# Patient Record
Sex: MALE | Race: WHITE | Employment: UNEMPLOYED | URBAN - METROPOLITAN AREA
[De-identification: names, ages, dates, MRNs, and addresses within clinical notes are randomized per-mention and may not be internally consistent; named-entity substitution may affect disease eponyms.]

---

## 2017-09-11 ENCOUNTER — HOSPITAL ENCOUNTER (OUTPATIENT)
Dept: SURGERY | Age: 57
Discharge: HOME | End: 2017-09-11
Payer: MEDICARE

## 2017-09-11 VITALS
SYSTOLIC BLOOD PRESSURE: 140 MMHG | WEIGHT: 227.06 LBS | TEMPERATURE: 98.9 F | DIASTOLIC BLOOD PRESSURE: 88 MMHG | HEIGHT: 76 IN | RESPIRATION RATE: 16 BRPM | BODY MASS INDEX: 27.65 KG/M2 | HEART RATE: 98 BPM | OXYGEN SATURATION: 100 %

## 2017-09-11 LAB
ANION GAP SERPL CALC-SCNC: 7 MMOL/L (ref 7–16)
APPEARANCE UR: CLEAR
ATRIAL RATE: 312 BPM
BACTERIA SPEC CULT: NORMAL
BASOPHILS # BLD: 0 K/UL (ref 0–0.2)
BASOPHILS NFR BLD: 0 % (ref 0–2)
BILIRUB UR QL: NEGATIVE
BUN SERPL-MCNC: 11 MG/DL (ref 6–23)
CALCIUM SERPL-MCNC: 9.4 MG/DL (ref 8.3–10.4)
CALCULATED R AXIS, ECG10: 87 DEGREES
CALCULATED T AXIS, ECG11: 97 DEGREES
CHLORIDE SERPL-SCNC: 106 MMOL/L (ref 98–107)
CO2 SERPL-SCNC: 29 MMOL/L (ref 21–32)
COLOR UR: YELLOW
CREAT SERPL-MCNC: 1.1 MG/DL (ref 0.8–1.5)
DIAGNOSIS, 93000: NORMAL
DIFFERENTIAL METHOD BLD: ABNORMAL
EOSINOPHIL # BLD: 0.2 K/UL (ref 0–0.8)
EOSINOPHIL NFR BLD: 2 % (ref 0.5–7.8)
ERYTHROCYTE [DISTWIDTH] IN BLOOD BY AUTOMATED COUNT: 12.8 % (ref 11.9–14.6)
EST. AVERAGE GLUCOSE BLD GHB EST-MCNC: 137 MG/DL
GLUCOSE BLD STRIP.AUTO-MCNC: 119 MG/DL (ref 65–100)
GLUCOSE SERPL-MCNC: 108 MG/DL (ref 65–100)
GLUCOSE UR STRIP.AUTO-MCNC: NEGATIVE MG/DL
HBA1C MFR BLD: 6.4 % (ref 4.8–6)
HCT VFR BLD AUTO: 46.7 % (ref 41.1–50.3)
HGB BLD-MCNC: 16.7 G/DL (ref 13.6–17.2)
HGB UR QL STRIP: NEGATIVE
IMM GRANULOCYTES # BLD: 0 K/UL (ref 0–0.5)
IMM GRANULOCYTES NFR BLD: 0.2 % (ref 0–5)
KETONES UR QL STRIP.AUTO: NEGATIVE MG/DL
LEUKOCYTE ESTERASE UR QL STRIP.AUTO: NEGATIVE
LYMPHOCYTES # BLD: 3.2 K/UL (ref 0.5–4.6)
LYMPHOCYTES NFR BLD: 30 % (ref 13–44)
MCH RBC QN AUTO: 29.5 PG (ref 26.1–32.9)
MCHC RBC AUTO-ENTMCNC: 35.8 G/DL (ref 31.4–35)
MCV RBC AUTO: 82.5 FL (ref 79.6–97.8)
MONOCYTES # BLD: 0.6 K/UL (ref 0.1–1.3)
MONOCYTES NFR BLD: 6 % (ref 4–12)
NEUTS SEG # BLD: 6.6 K/UL (ref 1.7–8.2)
NEUTS SEG NFR BLD: 62 % (ref 43–78)
NITRITE UR QL STRIP.AUTO: NEGATIVE
PH UR STRIP: 6 [PH] (ref 5–9)
PLATELET # BLD AUTO: 225 K/UL (ref 150–450)
PMV BLD AUTO: 10.2 FL (ref 10.8–14.1)
POTASSIUM SERPL-SCNC: 3.8 MMOL/L (ref 3.5–5.1)
PROT UR STRIP-MCNC: NEGATIVE MG/DL
Q-T INTERVAL, ECG07: 338 MS
QRS DURATION, ECG06: 92 MS
QTC CALCULATION (BEZET), ECG08: 375 MS
RBC # BLD AUTO: 5.66 M/UL (ref 4.23–5.67)
SERVICE CMNT-IMP: NORMAL
SODIUM SERPL-SCNC: 142 MMOL/L (ref 136–145)
SP GR UR REFRACTOMETRY: 1.01 (ref 1–1.02)
UROBILINOGEN UR QL STRIP.AUTO: 0.2 EU/DL (ref 0.2–1)
VENTRICULAR RATE, ECG03: 74 BPM
WBC # BLD AUTO: 10.7 K/UL (ref 4.3–11.1)

## 2017-09-11 PROCEDURE — 85025 COMPLETE CBC W/AUTO DIFF WBC: CPT | Performed by: ORTHOPAEDIC SURGERY

## 2017-09-11 PROCEDURE — 80048 BASIC METABOLIC PNL TOTAL CA: CPT | Performed by: ORTHOPAEDIC SURGERY

## 2017-09-11 PROCEDURE — 87641 MR-STAPH DNA AMP PROBE: CPT | Performed by: ORTHOPAEDIC SURGERY

## 2017-09-11 PROCEDURE — 93005 ELECTROCARDIOGRAM TRACING: CPT | Performed by: ANESTHESIOLOGY

## 2017-09-11 PROCEDURE — 83036 HEMOGLOBIN GLYCOSYLATED A1C: CPT | Performed by: ORTHOPAEDIC SURGERY

## 2017-09-11 PROCEDURE — 82962 GLUCOSE BLOOD TEST: CPT

## 2017-09-11 PROCEDURE — 81003 URINALYSIS AUTO W/O SCOPE: CPT | Performed by: ORTHOPAEDIC SURGERY

## 2017-09-11 PROCEDURE — 77030027138 HC INCENT SPIROMETER -A

## 2017-09-11 RX ORDER — METFORMIN HYDROCHLORIDE 500 MG/1
500 TABLET ORAL
COMMUNITY

## 2017-09-11 NOTE — PERIOP NOTES
All labs reviewed. MRSA/MSSA negative, UA negative. HGB A1c 6.4. All labs routed to Dr Taj Mascorro office.

## 2017-09-11 NOTE — PERIOP NOTES
Recent Results (from the past 12 hour(s))   GLUCOSE, POC    Collection Time: 09/11/17  9:40 AM   Result Value Ref Range    Glucose (POC) 119 (H) 65 - 100 mg/dL   URINALYSIS W/ RFLX MICROSCOPIC    Collection Time: 09/11/17  9:41 AM   Result Value Ref Range    Color YELLOW      Appearance CLEAR      Specific gravity 1.015 1.001 - 1.023      pH (UA) 6.0 5.0 - 9.0      Protein NEGATIVE  NEG mg/dL    Glucose NEGATIVE  mg/dL    Ketone NEGATIVE  NEG mg/dL    Bilirubin NEGATIVE  NEG      Blood NEGATIVE  NEG      Urobilinogen 0.2 0.2 - 1.0 EU/dL    Nitrites NEGATIVE  NEG      Leukocyte Esterase NEGATIVE  NEG     MSSA/MRSA SC BY PCR, NASAL SWAB    Collection Time: 09/11/17  9:41 AM   Result Value Ref Range    Special Requests: NO SPECIAL REQUESTS      Culture result:        SA target not detected. A MRSA NEGATIVE, SA NEGATIVE test result does not preclude MRSA or SA nasal colonization. CBC WITH AUTOMATED DIFF    Collection Time: 09/11/17 10:00 AM   Result Value Ref Range    WBC 10.7 4.3 - 11.1 K/uL    RBC 5.66 4.23 - 5.67 M/uL    HGB 16.7 13.6 - 17.2 g/dL    HCT 46.7 41.1 - 50.3 %    MCV 82.5 79.6 - 97.8 FL    MCH 29.5 26.1 - 32.9 PG    MCHC 35.8 (H) 31.4 - 35.0 g/dL    RDW 12.8 11.9 - 14.6 %    PLATELET 177 822 - 889 K/uL    MPV 10.2 (L) 10.8 - 14.1 FL    DF AUTOMATED      NEUTROPHILS 62 43 - 78 %    LYMPHOCYTES 30 13 - 44 %    MONOCYTES 6 4.0 - 12.0 %    EOSINOPHILS 2 0.5 - 7.8 %    BASOPHILS 0 0.0 - 2.0 %    IMMATURE GRANULOCYTES 0.2 0.0 - 5.0 %    ABS. NEUTROPHILS 6.6 1.7 - 8.2 K/UL    ABS. LYMPHOCYTES 3.2 0.5 - 4.6 K/UL    ABS. MONOCYTES 0.6 0.1 - 1.3 K/UL    ABS. EOSINOPHILS 0.2 0.0 - 0.8 K/UL    ABS. BASOPHILS 0.0 0.0 - 0.2 K/UL    ABS. IMM.  GRANS. 0.0 0.0 - 0.5 K/UL   METABOLIC PANEL, BASIC    Collection Time: 09/11/17 10:00 AM   Result Value Ref Range    Sodium 142 136 - 145 mmol/L    Potassium 3.8 3.5 - 5.1 mmol/L    Chloride 106 98 - 107 mmol/L    CO2 29 21 - 32 mmol/L    Anion gap 7 7 - 16 mmol/L    Glucose 108 (H) 65 - 100 mg/dL    BUN 11 6 - 23 MG/DL    Creatinine 1.10 0.8 - 1.5 MG/DL    GFR est AA >60 >60 ml/min/1.73m2    GFR est non-AA >60 >60 ml/min/1.73m2    Calcium 9.4 8.3 - 10.4 MG/DL   HEMOGLOBIN A1C WITH EAG    Collection Time: 09/11/17 10:00 AM   Result Value Ref Range    Hemoglobin A1c 6.4 (H) 4.8 - 6.0 %    Est. average glucose 137 mg/dL

## 2017-09-11 NOTE — PERIOP NOTES
Dr Madeline Simental reviewed pt. EKG obtained during PAT. Ordered to have pt follow up with Cardiologist prior to surg due to new onset AFIB. Pt voice understanding, appointment schedule 9/13/17 at 0900 at [facility].

## 2017-09-11 NOTE — PERIOP NOTES
Chart placed in chart room, to obtain cardiac clearance records after appointment scheduled on 9.13.17 with [facility] card.

## 2017-09-11 NOTE — PERIOP NOTES
Call placed to Dr Nancy Larry office, message left on voicemail for Delane Apley, informing pt to be seen by Cardiologist prior to surg.

## 2017-09-11 NOTE — PERIOP NOTES
Patient verified name, , and surgery as listed in [facility]. Patient provided medical/health information and PTA medications to the best of their ability. TYPE  CASE: 3  Orders per surgeon:  received  Labs per surgeon: CBC with diff,BMP,UA,Hgb A1c,MRSA/MSSA. Results: pending  Labs per anesthesia protocol: POC glucose. Results 119  EKG:  EKG 9.11.17     Nasal Swab collected per MD order and instructions for Mupirocin nasal ointment if required. Patient provided with and instructed on education handouts including Guide to Surgery, blood transfusions, pain management, and hand hygiene for the family and community, and [facility] brochure. Road to Recovery Spine surgery patient guide given. Instructed on incentive spirometry with return demonstration. Long handled prehab sponge given with instructions for use. Patient viewed spine prehab video. Hibiclens and instructions given per hospital policy. Instructed patient to continue previous medications as prescribed prior to surgery unless otherwise directed and to take the following medications the day of surgery according to anesthesia guidelines : Norvasc,Lipitor,Prozac . Instructed patient to hold  the following medications on the day of surgery: None. Original medication prescription bottles where not visualized during patient appointment. Medication profile updated and reviewed with patient. Patient teach back successful and patient demonstrates knowledge of instruction.

## 2017-09-21 ENCOUNTER — HOSPITAL ENCOUNTER (OUTPATIENT)
Dept: LAB | Age: 57
Discharge: HOME | End: 2017-09-21
Payer: MEDICARE

## 2017-09-21 DIAGNOSIS — I48.19 PERSISTENT ATRIAL FIBRILLATION (HCC): ICD-10-CM

## 2017-09-21 LAB — TSH SERPL DL<=0.005 MIU/L-ACNC: 1.16 UIU/ML (ref 0.36–3.74)

## 2017-09-21 PROCEDURE — 36415 COLL VENOUS BLD VENIPUNCTURE: CPT | Performed by: INTERNAL MEDICINE

## 2017-09-21 PROCEDURE — 84443 ASSAY THYROID STIM HORMONE: CPT | Performed by: INTERNAL MEDICINE

## 2017-10-09 ENCOUNTER — ANESTHESIA (OUTPATIENT)
Dept: SURGERY | Age: 57
DRG: 460 | End: 2017-10-09
Payer: MEDICARE

## 2017-10-09 ENCOUNTER — HOSPITAL ENCOUNTER (INPATIENT)
Age: 57
LOS: 3 days | Discharge: HOME | DRG: 460 | End: 2017-10-12
Attending: ORTHOPAEDIC SURGERY | Admitting: ORTHOPAEDIC SURGERY
Payer: MEDICARE

## 2017-10-09 ENCOUNTER — APPOINTMENT (OUTPATIENT)
Dept: GENERAL RADIOLOGY | Age: 57
DRG: 460 | End: 2017-10-09
Attending: ORTHOPAEDIC SURGERY
Payer: MEDICARE

## 2017-10-09 ENCOUNTER — ANESTHESIA EVENT (OUTPATIENT)
Dept: SURGERY | Age: 57
DRG: 460 | End: 2017-10-09
Payer: MEDICARE

## 2017-10-09 PROBLEM — M48.00 SPINAL STENOSIS: Status: ACTIVE | Noted: 2017-10-09

## 2017-10-09 PROBLEM — M43.16 SPONDYLOLISTHESIS OF LUMBAR REGION: Status: ACTIVE | Noted: 2017-10-09

## 2017-10-09 PROBLEM — M48.061 SPINAL STENOSIS OF LUMBAR REGION: Status: ACTIVE | Noted: 2017-10-09

## 2017-10-09 LAB
ABO + RH BLD: NORMAL
ANION GAP SERPL CALC-SCNC: 8 MMOL/L (ref 7–16)
BLOOD GROUP ANTIBODIES SERPL: NORMAL
BUN SERPL-MCNC: 13 MG/DL (ref 6–23)
CALCIUM SERPL-MCNC: 8.1 MG/DL (ref 8.3–10.4)
CHLORIDE SERPL-SCNC: 108 MMOL/L (ref 98–107)
CO2 SERPL-SCNC: 26 MMOL/L (ref 21–32)
CREAT SERPL-MCNC: 1.02 MG/DL (ref 0.8–1.5)
GLUCOSE BLD STRIP.AUTO-MCNC: 134 MG/DL (ref 65–100)
GLUCOSE SERPL-MCNC: 133 MG/DL (ref 65–100)
POTASSIUM SERPL-SCNC: 3.7 MMOL/L (ref 3.5–5.1)
SODIUM SERPL-SCNC: 142 MMOL/L (ref 136–145)
SPECIMEN EXP DATE BLD: NORMAL

## 2017-10-09 PROCEDURE — 77030020268 HC MISC GENERAL SUPPLY: Performed by: ORTHOPAEDIC SURGERY

## 2017-10-09 PROCEDURE — 77030032490 HC SLV COMPR SCD KNE COVD -B

## 2017-10-09 PROCEDURE — 77030011640 HC PAD GRND REM COVD -A: Performed by: ORTHOPAEDIC SURGERY

## 2017-10-09 PROCEDURE — 77030031139 HC SUT VCRL2 J&J -A: Performed by: ORTHOPAEDIC SURGERY

## 2017-10-09 PROCEDURE — 74011250636 HC RX REV CODE- 250/636

## 2017-10-09 PROCEDURE — 82962 GLUCOSE BLOOD TEST: CPT

## 2017-10-09 PROCEDURE — 74011250636 HC RX REV CODE- 250/636: Performed by: ORTHOPAEDIC SURGERY

## 2017-10-09 PROCEDURE — 0ST20ZZ RESECTION OF LUMBAR VERTEBRAL DISC, OPEN APPROACH: ICD-10-PCS | Performed by: ORTHOPAEDIC SURGERY

## 2017-10-09 PROCEDURE — 0SG10AJ FUSION OF 2 OR MORE LUMBAR VERTEBRAL JOINTS WITH INTERBODY FUSION DEVICE, POSTERIOR APPROACH, ANTERIOR COLUMN, OPEN APPROACH: ICD-10-PCS | Performed by: ORTHOPAEDIC SURGERY

## 2017-10-09 PROCEDURE — 65270000029 HC RM PRIVATE

## 2017-10-09 PROCEDURE — 94760 N-INVAS EAR/PLS OXIMETRY 1: CPT

## 2017-10-09 PROCEDURE — 0SG107J FUSION OF 2 OR MORE LUMBAR VERTEBRAL JOINTS WITH AUTOLOGOUS TISSUE SUBSTITUTE, POSTERIOR APPROACH, ANTERIOR COLUMN, OPEN APPROACH: ICD-10-PCS | Performed by: ORTHOPAEDIC SURGERY

## 2017-10-09 PROCEDURE — 74011250636 HC RX REV CODE- 250/636: Performed by: ANESTHESIOLOGY

## 2017-10-09 PROCEDURE — 77030019908 HC STETH ESOPH SIMS -A: Performed by: ANESTHESIOLOGY

## 2017-10-09 PROCEDURE — 74011000272 HC RX REV CODE- 272: Performed by: ORTHOPAEDIC SURGERY

## 2017-10-09 PROCEDURE — 77030020407 HC IV BLD WRMR ST 3M -A: Performed by: ANESTHESIOLOGY

## 2017-10-09 PROCEDURE — 0Q930ZZ DRAINAGE OF LEFT PELVIC BONE, OPEN APPROACH: ICD-10-PCS | Performed by: ORTHOPAEDIC SURGERY

## 2017-10-09 PROCEDURE — 74011250637 HC RX REV CODE- 250/637: Performed by: ANESTHESIOLOGY

## 2017-10-09 PROCEDURE — 77030030163 HC BN WAX J&J -A: Performed by: ORTHOPAEDIC SURGERY

## 2017-10-09 PROCEDURE — 77030014647 HC SEAL FBRN TISSL BAXT -D: Performed by: ORTHOPAEDIC SURGERY

## 2017-10-09 PROCEDURE — 77030032490 HC SLV COMPR SCD KNE COVD -B: Performed by: ORTHOPAEDIC SURGERY

## 2017-10-09 PROCEDURE — 77030037143 HC ROD SPN HEX XIA TI STRY -D: Performed by: ORTHOPAEDIC SURGERY

## 2017-10-09 PROCEDURE — 77030025623 HC BUR RND PRECIS STRY -D: Performed by: ORTHOPAEDIC SURGERY

## 2017-10-09 PROCEDURE — C1713 ANCHOR/SCREW BN/BN,TIS/BN: HCPCS | Performed by: ORTHOPAEDIC SURGERY

## 2017-10-09 PROCEDURE — 36415 COLL VENOUS BLD VENIPUNCTURE: CPT | Performed by: ORTHOPAEDIC SURGERY

## 2017-10-09 PROCEDURE — 74011250637 HC RX REV CODE- 250/637: Performed by: ORTHOPAEDIC SURGERY

## 2017-10-09 PROCEDURE — 00NY0ZZ RELEASE LUMBAR SPINAL CORD, OPEN APPROACH: ICD-10-PCS | Performed by: ORTHOPAEDIC SURGERY

## 2017-10-09 PROCEDURE — 77030014368: Performed by: ORTHOPAEDIC SURGERY

## 2017-10-09 PROCEDURE — 77030005401 HC CATH RAD ARRO -A: Performed by: ANESTHESIOLOGY

## 2017-10-09 PROCEDURE — 77010033678 HC OXYGEN DAILY

## 2017-10-09 PROCEDURE — 01NB0ZZ RELEASE LUMBAR NERVE, OPEN APPROACH: ICD-10-PCS | Performed by: ORTHOPAEDIC SURGERY

## 2017-10-09 PROCEDURE — 77030000001 HC SPCR SPN PEEK STRY -I2: Performed by: ORTHOPAEDIC SURGERY

## 2017-10-09 PROCEDURE — 77030037160 HC CGE SPN POST LUM TRITANIUM STRY -I2: Performed by: ORTHOPAEDIC SURGERY

## 2017-10-09 PROCEDURE — 77030012894: Performed by: ORTHOPAEDIC SURGERY

## 2017-10-09 PROCEDURE — 76010000176 HC OR TIME 4.5 TO 5 HR INTENSV-TIER 1: Performed by: ORTHOPAEDIC SURGERY

## 2017-10-09 PROCEDURE — 77030037145 HC XCONN SPN POLYX XIA STRY -G: Performed by: ORTHOPAEDIC SURGERY

## 2017-10-09 PROCEDURE — 76060000040 HC ANESTHESIA 4.5 TO 5 HR: Performed by: ORTHOPAEDIC SURGERY

## 2017-10-09 PROCEDURE — 74011000250 HC RX REV CODE- 250

## 2017-10-09 PROCEDURE — 77030008477 HC STYL SATN SLP COVD -A: Performed by: ANESTHESIOLOGY

## 2017-10-09 PROCEDURE — 77030037710: Performed by: ORTHOPAEDIC SURGERY

## 2017-10-09 PROCEDURE — 86900 BLOOD TYPING SEROLOGIC ABO: CPT | Performed by: ANESTHESIOLOGY

## 2017-10-09 PROCEDURE — 77030037155 HC BLCKR SPN CAP LOK AXI STRY -B: Performed by: ORTHOPAEDIC SURGERY

## 2017-10-09 PROCEDURE — 77030020782 HC GWN BAIR PAWS FLX 3M -B: Performed by: ANESTHESIOLOGY

## 2017-10-09 PROCEDURE — 77030018836 HC SOL IRR NACL ICUM -A: Performed by: ORTHOPAEDIC SURGERY

## 2017-10-09 PROCEDURE — 77030037158 HC BIT DRL SPN XIA DISP STRY -C: Performed by: ORTHOPAEDIC SURGERY

## 2017-10-09 PROCEDURE — 76210000017 HC OR PH I REC 1.5 TO 2 HR: Performed by: ORTHOPAEDIC SURGERY

## 2017-10-09 PROCEDURE — 77030034849: Performed by: ORTHOPAEDIC SURGERY

## 2017-10-09 PROCEDURE — 77030027138 HC INCENT SPIROMETER -A

## 2017-10-09 PROCEDURE — 80048 BASIC METABOLIC PNL TOTAL CA: CPT | Performed by: ORTHOPAEDIC SURGERY

## 2017-10-09 PROCEDURE — 77030008703 HC TU ET UNCUF COVD -A: Performed by: ANESTHESIOLOGY

## 2017-10-09 PROCEDURE — 77030031359 HC BLD BN MILL DISP STRY -E: Performed by: ORTHOPAEDIC SURGERY

## 2017-10-09 PROCEDURE — 72100 X-RAY EXAM L-S SPINE 2/3 VWS: CPT

## 2017-10-09 PROCEDURE — 77030034760 HC NDL BN MAR ASPIR JAMSH STRY -B: Performed by: ORTHOPAEDIC SURGERY

## 2017-10-09 DEVICE — VERTEBRAL SPACER
Type: IMPLANTABLE DEVICE | Site: SPINE LUMBAR | Status: FUNCTIONAL
Brand: AVS TL

## 2017-10-09 DEVICE — 30-36 MM POLYAXIAL CROSS CONNECTOR
Type: IMPLANTABLE DEVICE | Site: SPINE LUMBAR | Status: FUNCTIONAL
Brand: XIA 4 5

## 2017-10-09 DEVICE — GRAFT BNE SUB 5CC 2MM GRAN ALLOGENIC MORPHOGENETIC PROT W: Type: IMPLANTABLE DEVICE | Site: SPINE LUMBAR | Status: FUNCTIONAL

## 2017-10-09 DEVICE — BLOCKER
Type: IMPLANTABLE DEVICE | Site: SPINE LUMBAR | Status: FUNCTIONAL
Brand: XIA 4.5 SYSTEM -  XIA CT

## 2017-10-09 DEVICE — POLYAXIAL CORTICAL SCREW
Type: IMPLANTABLE DEVICE | Site: SPINE LUMBAR | Status: FUNCTIONAL
Brand: XIA 4.5 SYSTEM -  XIA CT

## 2017-10-09 DEVICE — GRAFT BNE SUB 10CC 2-4MM GROWTH FACT ALLGRFT OSTEOAMP: Type: IMPLANTABLE DEVICE | Site: SPINE LUMBAR | Status: FUNCTIONAL

## 2017-10-09 DEVICE — BIO DBM PLUS PUTTY (WITH CANCELLOUS)
Type: IMPLANTABLE DEVICE | Site: SPINE LUMBAR | Status: FUNCTIONAL
Brand: BIO DBM

## 2017-10-09 DEVICE — GRAFT BNE SUB 20CC 2 4MM GRAN GROWTH FACT ALLGRFT OSTEOAMP: Type: IMPLANTABLE DEVICE | Site: SPINE LUMBAR | Status: FUNCTIONAL

## 2017-10-09 DEVICE — VITALLIUM PREBENT AND PRECUT ROD WITH HEX
Type: IMPLANTABLE DEVICE | Site: SPINE LUMBAR | Status: FUNCTIONAL
Brand: XIA 4 5

## 2017-10-09 RX ORDER — GLYCOPYRROLATE 0.2 MG/ML
INJECTION INTRAMUSCULAR; INTRAVENOUS AS NEEDED
Status: DISCONTINUED | OUTPATIENT
Start: 2017-10-09 | End: 2017-10-09 | Stop reason: HOSPADM

## 2017-10-09 RX ORDER — HYDROMORPHONE HYDROCHLORIDE 2 MG/ML
INJECTION, SOLUTION INTRAMUSCULAR; INTRAVENOUS; SUBCUTANEOUS AS NEEDED
Status: DISCONTINUED | OUTPATIENT
Start: 2017-10-09 | End: 2017-10-09 | Stop reason: HOSPADM

## 2017-10-09 RX ORDER — CEFAZOLIN SODIUM IN 0.9 % NACL 2 G/50 ML
2 INTRAVENOUS SOLUTION, PIGGYBACK (ML) INTRAVENOUS EVERY 8 HOURS
Status: COMPLETED | OUTPATIENT
Start: 2017-10-10 | End: 2017-10-10

## 2017-10-09 RX ORDER — LIDOCAINE HYDROCHLORIDE 20 MG/ML
INJECTION, SOLUTION EPIDURAL; INFILTRATION; INTRACAUDAL; PERINEURAL AS NEEDED
Status: DISCONTINUED | OUTPATIENT
Start: 2017-10-09 | End: 2017-10-09 | Stop reason: HOSPADM

## 2017-10-09 RX ORDER — SODIUM CHLORIDE 0.9 % (FLUSH) 0.9 %
5-10 SYRINGE (ML) INJECTION AS NEEDED
Status: DISCONTINUED | OUTPATIENT
Start: 2017-10-09 | End: 2017-10-09 | Stop reason: HOSPADM

## 2017-10-09 RX ORDER — SODIUM CHLORIDE, SODIUM LACTATE, POTASSIUM CHLORIDE, CALCIUM CHLORIDE 600; 310; 30; 20 MG/100ML; MG/100ML; MG/100ML; MG/100ML
75 INJECTION, SOLUTION INTRAVENOUS CONTINUOUS
Status: DISCONTINUED | OUTPATIENT
Start: 2017-10-09 | End: 2017-10-09 | Stop reason: HOSPADM

## 2017-10-09 RX ORDER — HYDROMORPHONE HYDROCHLORIDE 1 MG/ML
1 INJECTION, SOLUTION INTRAMUSCULAR; INTRAVENOUS; SUBCUTANEOUS ONCE
Status: COMPLETED | OUTPATIENT
Start: 2017-10-09 | End: 2017-10-09

## 2017-10-09 RX ORDER — ACETAMINOPHEN 10 MG/ML
INJECTION, SOLUTION INTRAVENOUS AS NEEDED
Status: DISCONTINUED | OUTPATIENT
Start: 2017-10-09 | End: 2017-10-09 | Stop reason: HOSPADM

## 2017-10-09 RX ORDER — FAMOTIDINE 20 MG/1
20 TABLET, FILM COATED ORAL EVERY 12 HOURS
Status: DISCONTINUED | OUTPATIENT
Start: 2017-10-09 | End: 2017-10-12 | Stop reason: HOSPADM

## 2017-10-09 RX ORDER — FLUOXETINE HYDROCHLORIDE 20 MG/1
20 CAPSULE ORAL DAILY
Status: DISCONTINUED | OUTPATIENT
Start: 2017-10-10 | End: 2017-10-12 | Stop reason: HOSPADM

## 2017-10-09 RX ORDER — SODIUM CHLORIDE, SODIUM LACTATE, POTASSIUM CHLORIDE, CALCIUM CHLORIDE 600; 310; 30; 20 MG/100ML; MG/100ML; MG/100ML; MG/100ML
INJECTION, SOLUTION INTRAVENOUS
Status: DISCONTINUED | OUTPATIENT
Start: 2017-10-09 | End: 2017-10-09 | Stop reason: HOSPADM

## 2017-10-09 RX ORDER — SODIUM CHLORIDE 0.9 % (FLUSH) 0.9 %
5-10 SYRINGE (ML) INJECTION AS NEEDED
Status: DISCONTINUED | OUTPATIENT
Start: 2017-10-09 | End: 2017-10-12 | Stop reason: HOSPADM

## 2017-10-09 RX ORDER — HYDROMORPHONE HYDROCHLORIDE 1 MG/ML
1 INJECTION, SOLUTION INTRAMUSCULAR; INTRAVENOUS; SUBCUTANEOUS
Status: DISCONTINUED | OUTPATIENT
Start: 2017-10-09 | End: 2017-10-12 | Stop reason: HOSPADM

## 2017-10-09 RX ORDER — NALOXONE HYDROCHLORIDE 0.4 MG/ML
0.2 INJECTION, SOLUTION INTRAMUSCULAR; INTRAVENOUS; SUBCUTANEOUS AS NEEDED
Status: DISCONTINUED | OUTPATIENT
Start: 2017-10-09 | End: 2017-10-09 | Stop reason: HOSPADM

## 2017-10-09 RX ORDER — MIDAZOLAM HYDROCHLORIDE 1 MG/ML
2 INJECTION, SOLUTION INTRAMUSCULAR; INTRAVENOUS
Status: DISCONTINUED | OUTPATIENT
Start: 2017-10-09 | End: 2017-10-09 | Stop reason: HOSPADM

## 2017-10-09 RX ORDER — FENTANYL CITRATE 50 UG/ML
INJECTION, SOLUTION INTRAMUSCULAR; INTRAVENOUS AS NEEDED
Status: DISCONTINUED | OUTPATIENT
Start: 2017-10-09 | End: 2017-10-09 | Stop reason: HOSPADM

## 2017-10-09 RX ORDER — NEOSTIGMINE METHYLSULFATE 1 MG/ML
INJECTION INTRAVENOUS AS NEEDED
Status: DISCONTINUED | OUTPATIENT
Start: 2017-10-09 | End: 2017-10-09 | Stop reason: HOSPADM

## 2017-10-09 RX ORDER — ONDANSETRON 2 MG/ML
INJECTION INTRAMUSCULAR; INTRAVENOUS AS NEEDED
Status: DISCONTINUED | OUTPATIENT
Start: 2017-10-09 | End: 2017-10-09 | Stop reason: HOSPADM

## 2017-10-09 RX ORDER — AMLODIPINE BESYLATE 10 MG/1
10 TABLET ORAL DAILY
Status: DISCONTINUED | OUTPATIENT
Start: 2017-10-10 | End: 2017-10-12 | Stop reason: HOSPADM

## 2017-10-09 RX ORDER — PROPOFOL 10 MG/ML
INJECTION, EMULSION INTRAVENOUS AS NEEDED
Status: DISCONTINUED | OUTPATIENT
Start: 2017-10-09 | End: 2017-10-09 | Stop reason: HOSPADM

## 2017-10-09 RX ORDER — FAMOTIDINE 20 MG/1
20 TABLET, FILM COATED ORAL ONCE
Status: COMPLETED | OUTPATIENT
Start: 2017-10-09 | End: 2017-10-09

## 2017-10-09 RX ORDER — SODIUM CHLORIDE, SODIUM LACTATE, POTASSIUM CHLORIDE, CALCIUM CHLORIDE 600; 310; 30; 20 MG/100ML; MG/100ML; MG/100ML; MG/100ML
100 INJECTION, SOLUTION INTRAVENOUS CONTINUOUS
Status: DISCONTINUED | OUTPATIENT
Start: 2017-10-09 | End: 2017-10-12 | Stop reason: HOSPADM

## 2017-10-09 RX ORDER — HYDROMORPHONE HYDROCHLORIDE 2 MG/ML
0.5 INJECTION, SOLUTION INTRAMUSCULAR; INTRAVENOUS; SUBCUTANEOUS
Status: DISCONTINUED | OUTPATIENT
Start: 2017-10-09 | End: 2017-10-09 | Stop reason: HOSPADM

## 2017-10-09 RX ORDER — OXYCODONE AND ACETAMINOPHEN 5; 325 MG/1; MG/1
1 TABLET ORAL AS NEEDED
Status: DISCONTINUED | OUTPATIENT
Start: 2017-10-09 | End: 2017-10-09 | Stop reason: HOSPADM

## 2017-10-09 RX ORDER — ATORVASTATIN CALCIUM 10 MG/1
20 TABLET, FILM COATED ORAL DAILY
Status: DISCONTINUED | OUTPATIENT
Start: 2017-10-10 | End: 2017-10-12 | Stop reason: HOSPADM

## 2017-10-09 RX ORDER — CYCLOBENZAPRINE HCL 10 MG
5 TABLET ORAL
Status: DISCONTINUED | OUTPATIENT
Start: 2017-10-09 | End: 2017-10-12 | Stop reason: HOSPADM

## 2017-10-09 RX ORDER — IBUPROFEN 400 MG/1
400 TABLET ORAL
Status: DISCONTINUED | OUTPATIENT
Start: 2017-10-09 | End: 2017-10-12 | Stop reason: HOSPADM

## 2017-10-09 RX ORDER — CEFAZOLIN SODIUM IN 0.9 % NACL 2 G/50 ML
2 INTRAVENOUS SOLUTION, PIGGYBACK (ML) INTRAVENOUS ONCE
Status: COMPLETED | OUTPATIENT
Start: 2017-10-09 | End: 2017-10-09

## 2017-10-09 RX ORDER — ONDANSETRON 2 MG/ML
4 INJECTION INTRAMUSCULAR; INTRAVENOUS
Status: DISCONTINUED | OUTPATIENT
Start: 2017-10-09 | End: 2017-10-12 | Stop reason: HOSPADM

## 2017-10-09 RX ORDER — SODIUM CHLORIDE 0.9 % (FLUSH) 0.9 %
5-10 SYRINGE (ML) INJECTION EVERY 8 HOURS
Status: DISCONTINUED | OUTPATIENT
Start: 2017-10-09 | End: 2017-10-12 | Stop reason: HOSPADM

## 2017-10-09 RX ORDER — ROCURONIUM BROMIDE 10 MG/ML
INJECTION, SOLUTION INTRAVENOUS AS NEEDED
Status: DISCONTINUED | OUTPATIENT
Start: 2017-10-09 | End: 2017-10-09 | Stop reason: HOSPADM

## 2017-10-09 RX ORDER — ADHESIVE BANDAGE
30 BANDAGE TOPICAL DAILY
Status: DISCONTINUED | OUTPATIENT
Start: 2017-10-10 | End: 2017-10-12 | Stop reason: HOSPADM

## 2017-10-09 RX ORDER — LIDOCAINE HYDROCHLORIDE 10 MG/ML
0.1 INJECTION INFILTRATION; PERINEURAL AS NEEDED
Status: DISCONTINUED | OUTPATIENT
Start: 2017-10-09 | End: 2017-10-09 | Stop reason: HOSPADM

## 2017-10-09 RX ORDER — METFORMIN HYDROCHLORIDE 500 MG/1
500 TABLET ORAL
Status: DISCONTINUED | OUTPATIENT
Start: 2017-10-10 | End: 2017-10-12 | Stop reason: HOSPADM

## 2017-10-09 RX ORDER — OXYCODONE AND ACETAMINOPHEN 10; 325 MG/1; MG/1
1 TABLET ORAL
Status: DISCONTINUED | OUTPATIENT
Start: 2017-10-09 | End: 2017-10-12 | Stop reason: HOSPADM

## 2017-10-09 RX ADMIN — ROCURONIUM BROMIDE 10 MG: 10 INJECTION, SOLUTION INTRAVENOUS at 15:37

## 2017-10-09 RX ADMIN — GLYCOPYRROLATE 0.4 MG: 0.2 INJECTION INTRAMUSCULAR; INTRAVENOUS at 17:39

## 2017-10-09 RX ADMIN — CEFAZOLIN 2 G: 1 INJECTION, POWDER, FOR SOLUTION INTRAMUSCULAR; INTRAVENOUS; PARENTERAL at 16:34

## 2017-10-09 RX ADMIN — HYDROMORPHONE HYDROCHLORIDE 0.4 MG: 2 INJECTION, SOLUTION INTRAMUSCULAR; INTRAVENOUS; SUBCUTANEOUS at 17:27

## 2017-10-09 RX ADMIN — SODIUM CHLORIDE, SODIUM LACTATE, POTASSIUM CHLORIDE, AND CALCIUM CHLORIDE: 600; 310; 30; 20 INJECTION, SOLUTION INTRAVENOUS at 13:48

## 2017-10-09 RX ADMIN — ONDANSETRON 4 MG: 2 INJECTION INTRAMUSCULAR; INTRAVENOUS at 15:48

## 2017-10-09 RX ADMIN — OXYCODONE HYDROCHLORIDE AND ACETAMINOPHEN 1 TABLET: 10; 325 TABLET ORAL at 20:57

## 2017-10-09 RX ADMIN — Medication 5 ML: at 21:01

## 2017-10-09 RX ADMIN — LIDOCAINE HYDROCHLORIDE 60 MG: 20 INJECTION, SOLUTION EPIDURAL; INFILTRATION; INTRACAUDAL; PERINEURAL at 13:23

## 2017-10-09 RX ADMIN — ROCURONIUM BROMIDE 5 MG: 10 INJECTION, SOLUTION INTRAVENOUS at 16:30

## 2017-10-09 RX ADMIN — FAMOTIDINE 20 MG: 20 TABLET ORAL at 09:33

## 2017-10-09 RX ADMIN — HYDROMORPHONE HYDROCHLORIDE 1 MG: 1 INJECTION, SOLUTION INTRAMUSCULAR; INTRAVENOUS; SUBCUTANEOUS at 09:42

## 2017-10-09 RX ADMIN — SODIUM CHLORIDE, SODIUM LACTATE, POTASSIUM CHLORIDE, CALCIUM CHLORIDE: 600; 310; 30; 20 INJECTION, SOLUTION INTRAVENOUS at 15:17

## 2017-10-09 RX ADMIN — FENTANYL CITRATE 50 MCG: 50 INJECTION, SOLUTION INTRAMUSCULAR; INTRAVENOUS at 13:45

## 2017-10-09 RX ADMIN — ROCURONIUM BROMIDE 10 MG: 10 INJECTION, SOLUTION INTRAVENOUS at 14:00

## 2017-10-09 RX ADMIN — ROCURONIUM BROMIDE 5 MG: 10 INJECTION, SOLUTION INTRAVENOUS at 15:10

## 2017-10-09 RX ADMIN — HYDROMORPHONE HYDROCHLORIDE 0.5 MG: 2 INJECTION, SOLUTION INTRAMUSCULAR; INTRAVENOUS; SUBCUTANEOUS at 18:46

## 2017-10-09 RX ADMIN — HYDROMORPHONE HYDROCHLORIDE 0.5 MG: 2 INJECTION, SOLUTION INTRAMUSCULAR; INTRAVENOUS; SUBCUTANEOUS at 18:33

## 2017-10-09 RX ADMIN — FENTANYL CITRATE 50 MCG: 50 INJECTION, SOLUTION INTRAMUSCULAR; INTRAVENOUS at 15:15

## 2017-10-09 RX ADMIN — SODIUM CHLORIDE, SODIUM LACTATE, POTASSIUM CHLORIDE, CALCIUM CHLORIDE: 600; 310; 30; 20 INJECTION, SOLUTION INTRAVENOUS at 13:48

## 2017-10-09 RX ADMIN — PROPOFOL 200 MG: 10 INJECTION, EMULSION INTRAVENOUS at 13:23

## 2017-10-09 RX ADMIN — FAMOTIDINE 20 MG: 20 TABLET, FILM COATED ORAL at 20:57

## 2017-10-09 RX ADMIN — NEOSTIGMINE METHYLSULFATE 3 MG: 1 INJECTION INTRAVENOUS at 17:39

## 2017-10-09 RX ADMIN — FENTANYL CITRATE 50 MCG: 50 INJECTION, SOLUTION INTRAMUSCULAR; INTRAVENOUS at 15:44

## 2017-10-09 RX ADMIN — SODIUM CHLORIDE, SODIUM LACTATE, POTASSIUM CHLORIDE, AND CALCIUM CHLORIDE 100 ML/HR: 600; 310; 30; 20 INJECTION, SOLUTION INTRAVENOUS at 20:57

## 2017-10-09 RX ADMIN — HYDROMORPHONE HYDROCHLORIDE 0.4 MG: 2 INJECTION, SOLUTION INTRAMUSCULAR; INTRAVENOUS; SUBCUTANEOUS at 17:00

## 2017-10-09 RX ADMIN — NEOSTIGMINE METHYLSULFATE 1 MG: 1 INJECTION INTRAVENOUS at 17:44

## 2017-10-09 RX ADMIN — PROPOFOL 50 MG: 10 INJECTION, EMULSION INTRAVENOUS at 16:32

## 2017-10-09 RX ADMIN — ROCURONIUM BROMIDE 5 MG: 10 INJECTION, SOLUTION INTRAVENOUS at 16:22

## 2017-10-09 RX ADMIN — ROCURONIUM BROMIDE 20 MG: 10 INJECTION, SOLUTION INTRAVENOUS at 13:37

## 2017-10-09 RX ADMIN — HYDROMORPHONE HYDROCHLORIDE 0.5 MG: 2 INJECTION, SOLUTION INTRAMUSCULAR; INTRAVENOUS; SUBCUTANEOUS at 18:28

## 2017-10-09 RX ADMIN — FENTANYL CITRATE 50 MCG: 50 INJECTION, SOLUTION INTRAMUSCULAR; INTRAVENOUS at 13:39

## 2017-10-09 RX ADMIN — SODIUM CHLORIDE, SODIUM LACTATE, POTASSIUM CHLORIDE, AND CALCIUM CHLORIDE 75 ML/HR: 600; 310; 30; 20 INJECTION, SOLUTION INTRAVENOUS at 09:08

## 2017-10-09 RX ADMIN — ROCURONIUM BROMIDE 40 MG: 10 INJECTION, SOLUTION INTRAVENOUS at 13:23

## 2017-10-09 RX ADMIN — FENTANYL CITRATE 50 MCG: 50 INJECTION, SOLUTION INTRAMUSCULAR; INTRAVENOUS at 15:27

## 2017-10-09 RX ADMIN — GLYCOPYRROLATE 0.2 MG: 0.2 INJECTION INTRAMUSCULAR; INTRAVENOUS at 17:44

## 2017-10-09 RX ADMIN — ACETAMINOPHEN 1000 MG: 10 INJECTION, SOLUTION INTRAVENOUS at 16:11

## 2017-10-09 RX ADMIN — CEFAZOLIN 2 G: 1 INJECTION, POWDER, FOR SOLUTION INTRAMUSCULAR; INTRAVENOUS; PARENTERAL at 13:13

## 2017-10-09 RX ADMIN — HYDROMORPHONE HYDROCHLORIDE 0.5 MG: 2 INJECTION, SOLUTION INTRAMUSCULAR; INTRAVENOUS; SUBCUTANEOUS at 18:39

## 2017-10-09 RX ADMIN — FENTANYL CITRATE 50 MCG: 50 INJECTION, SOLUTION INTRAMUSCULAR; INTRAVENOUS at 14:00

## 2017-10-09 RX ADMIN — FENTANYL CITRATE 100 MCG: 50 INJECTION, SOLUTION INTRAMUSCULAR; INTRAVENOUS at 13:23

## 2017-10-09 NOTE — BRIEF OP NOTE
BRIEF OPERATIVE NOTE    Date of Procedure: 10/9/2017   Preoperative Diagnosis: Lumbar stenosis [M48.06]  Spondylolisthesis of lumbar region [M43.16]  Weakness of both lower extremities [R29.898]  Postoperative Diagnosis: Lumbar stenosis [M48.06]  Spondylolisthesis of lumbar region [M43.16]  Weakness of both lower extremities [R29.898]    Procedure(s):  L3-L5 LAMI FUSION SPINE  (TLIF)  Surgeon(s) and Role:     * Brett Tineo MD - Primary         Assistant Staff:       Surgical Staff:  Circ-1: Lydia Avila RN  Circ-Relief: Tiny Thompson RN; Nando Howard RN  Radiology Technician: RT David  Scrub Tech-1: Augusto Kocher Webster  Scrub Tech-2: Patti Cutler  Scrub Tech-Relief: Kathy Sanz; Brandon Tariq; Juan Swartz  Event Time In   Incision Start 1342   Incision Close      Anesthesia: General   Estimated Blood Loss: 600cc  Specimens: * No specimens in log *   Findings: severe stenosis   Complications: none  Implants:   Implant Name Type Inv.  Item Serial No.  Lot No. LRB No. Used Action   GRAFT BNE GRAN 5ML -- OSTEOAMP - [id number]  GRAFT BNE GRAN 5ML -- OSTEOAMP [id number]  N/A 1 Implanted   GRAFT BNE GRAN DBM 2-4MM 10CC -- OSTEOAMP - [id number]  GRAFT BNE GRAN DBM 2-4MM 10CC -- OSTEOAMP  [employer] [id number] N/A 1 Implanted   GRAFT BNE GRAN DBM 2-4MM 20ML -- OSTEOAMP - [id number]  GRAFT BNE GRAN DBM 2-4MM 20ML -- OSTEOAMP [id number]  N/A 1 Implanted   GRAFT DBM PTTY+ W/CHIP 10ML -- BIO DBM - [id number]  GRAFT DBM PTTY+ W/CHIP 10ML -- BIO DBM  JOSE SPINE [facility] [id number] N/A 1 Implanted   Tritanium PL 14mm x28mm x 6*    JOSE SPINE [facility] [id number] N/A 1 Implanted   BLOCKER SPNE CAP LCK -- JESUS 4.5 CT - [id number]  BLOCKER SPNE CAP LCK -- JESUS 4.5 CT  JOSE SPINE HOWM [id number] N/A 6 Implanted   SCR BNE MARCELLO POLYAXL 5.5X35MM -- JESUS 4.5 CT - [id number]  SCR BNE MARCELLO POLYAXL 5.5X35MM -- JESUS 4.5 CT JOSE SPINE HOWM [id number] N/A 6 Implanted   STACIA SPNE W/HEX 4.5X70MM VIT -- JESUS 4.5 - [id number]  STACIA SPNE W/HEX 4.5X70MM VIT -- JESUS 4.5  JOSE SPINE HOWM [id number] N/A 2 Implanted   SPACER SPNE VERT AVS 4D 16X30 --  - [id number]  SPACER SPNE VERT AVS 4D 16X30 --   JOSE SPINE HOWM [id number] N/A 1 Implanted   CONN CROSS SPNE POLYAXL MED -- JESUS 4.5 - [id number]   CONN CROSS SPNE POLYAXL MED -- JESUS 4.5   JOSE SPINE HOWM [id number] N/A 1 Implanted

## 2017-10-09 NOTE — H&P
[facility]   HISTORY AND PHYSICAL       Name:  Sarah Pillai   MR#:  [id number]   :  1960   Account #:  [de-identified]   Date of Adm:  10/09/2017       HISTORY OF PRESENT ILLNESS: He was last seen in the office   2017 for low back pain and bilateral leg pain and weakness   and nothing has changed in his review of systems or history   since that time, so for details on review of systems, refer to   that H and P. He is a 53-year-old gentleman on disability   because of back and leg pain. He has spinal stenosis. He did not   get better with conservative treatment. He also has a   spondylolisthesis at L4-L5. Because of his failure to get better,   he is being brought for surgical treatment. His medical problems   are high blood pressure, elevated cholesterol. ALLERGIES: HAS NO KNOWN DRUG ALLERGIES. MEDICATIONS: Currently on Norco for pain. PHYSICAL EXAMINATION   GENERAL: He is a healthy appearing gentleman. No gross   deformities. Mental status, alert and oriented x3. Normal   affect. HEENT: Exam shows pupils equal, round, reactive to light. Oropharynx clear. NECK: Without adenopathy or bruit. LUNGS: Clear to auscultation. HEART: Regular rate and rhythm without murmur. ABDOMEN: Soft and nontender, without hepatosplenomegaly. PLAN: L3-L5 laminectomy, fusion and TLIF. He has majority right   leg pain. We plan to perform a right L5 foraminotomy as well. We   went through the risk of surgery which include death, paralysis,   infection, bleeding, transfusion, heart attack, stroke, clot leg   clot lung, dural tear, failure of fusion,   failed instrumentation, and he desires to proceed on with   surgery. His other positive past medical is a history colon   cancer in , treated with surgery, no recurrence. Plan for   L3-L5 laminectomy and fusion, TLIF.         MD BRENDEN Rushing / Kumar   D:  10/09/2017   13:06   T:  10/09/2017   13:18   Job #:  [id number]

## 2017-10-09 NOTE — PROGRESS NOTES
TRANSFER - IN REPORT:    Verbal report received from Leopold Donna, [facility] on Guille Yusuf  being received from PACU for ordered procedure      Report consisted of patients Situation, Background, Assessment and   Recommendations(SBAR). Information from the following report(s) SBAR, Kardex, ED Summary, [facility] and Recent Results was reviewed with the receiving nurse. Opportunity for questions and clarification was provided. Assessment completed upon patients arrival to unit and care assumed.

## 2017-10-09 NOTE — ANESTHESIA PREPROCEDURE EVALUATION
Anesthetic History   No history of anesthetic complications            Review of Systems / Medical History  Patient summary reviewed, nursing notes reviewed and pertinent labs reviewed    Pulmonary  Within defined limits                 Neuro/Psych             Comments: Bilateral lower extremity weakness and apin Cardiovascular    Hypertension          Hyperlipidemia         GI/Hepatic/Renal                Endo/Other    Diabetes: well controlled, type 2    Arthritis     Other Findings              Physical Exam    Airway  Mallampati: II  TM Distance: 4 - 6 cm  Neck ROM: normal range of motion   Mouth opening: Normal     Cardiovascular    Rhythm: regular           Dental    Dentition: Edentulous     Pulmonary  Breath sounds clear to auscultation               Abdominal  GI exam deferred       Other Findings            Anesthetic Plan    ASA: 2  Anesthesia type: general          Induction: Intravenous  Anesthetic plan and risks discussed with: Patient

## 2017-10-09 NOTE — OP NOTES
[facility]   OPERATIVE REPORT       Name:  Geronimo Jaramillo   MR#:  [id number]   :  1960   Account #:  [de-identified]   Date of Adm:  10/09/2017       DATE OF PROCEDURE: 10/09/2017     PREOPERATIVE DIAGNOSES: Bilateral L3-4 and L4-L5 severe spinal   stenosis and spondylolisthesis. POSTOPERATIVE DIAGNOSES: L3-4 and L4-5 severe spinal stenosis   and spondylolisthesis. PROCEDURES:   1. Bilateral L3 to 5 laminectomy with medial partial   facetectomy. 2. L3-4 and L4-5 posterior lumbar interbody fusion. 3. Placement of biomechanical interbody device at L3-4 and L4-5   using the [employer] biomechanical implant. 4. L3 to L5 posterolateral fusion. 5. Placement of segmental spinal instrumentation L3 to L5 using   the Louise cortical screw and jose system. 6. Left iliac crest bone marrow aspiration. 7. Use of local bone graft, OsteoAMP and demineralized bone   matrix for fusion. SURGEON: Indiana Garces. Roland Brooks MD    ANESTHESIA: GETA. ESTIMATED BLOOD LOSS: 600 mL. FLUIDS: 3 liters crystalloid. DRAINS: One Hemovac. SPECIMENS: None. INDICATIONS: The patient is a 24-year-old gentleman who   has severe back and bilateral leg pain that has severely limited   his ability to stand and walk any distance, it has ruined his   lifestyle. He failed to get better long-term with epidural   steroid injections, pain medication, and activity restriction,   so he was brought for surgical treatment. PROCEDURE: The patient was brought to the operating room. After   administration of anesthesia, IV antibiotics and placement of   monitoring lines, he was positioned prone on the Brenda Niece frame. His back was prepped with Betadine and sterilely draped. At this   point, surgeon called a timeout, confirmed the procedure to be   performed, his allergy history and the fact that he had received   preoperative IV antibiotics.  A scalpel was used to make a   midline incision over the lower lumbar spine. We dissected down   through the subcutaneous tissue with electrocautery to the deep   fascia. Deep fascia was incised on either side of the spinous   processes and dissected down along the lamina out the facet   joint. We placed an angled curette in the interlaminar space,   took a lateral C-arm x-ray to determine our location. Once we   had done this, we stripped out the lateral gutter from the L3   transverse process down to L5. The L3-4 and L4-5 facet joints   were hugely hypertrophic. We had to osteotomize the joints. Once   we had stripped down to the membrane between the transverse   processes at each level, we removed the spinous processes of L3,   4 and 5 and saved this for local bone graft. We then burred down   the lamina of L3, 4 and 5. Bilaterally with a high-speed bur,   used a rongeur to break off as much dorsal bone as possible. Then we burred the lamina down further. We used angled curettes   to scrape the ligamentum off the undersurface of the bone then   used Kerrisons to perform a bilateral L3, 4, 5 laminectomies,   undercut the facet joints and performed foraminotomies. The   foramen had pieces of the facet joint growing into then causing   severe stenosis. The worst stenosis was at L4-5, but after we   had completed the decompression laminectomy, the dura expanded   nicely. The nerve roots were free. We then elected to place   interbody devices at both levels. We retracted the neural   structures medially, cauterized the epidural veins, incised the   disk annulus, removed disk with pituitaries. Then we used the   distractors and the zeina to remove disk and prepare the   endplate using angled curettes to remove bone off the endplates   and used the pituitaries to remove all the disk and eventually   we decided that a 16 mm device had an appropriate fit at L4-5,   and a 14 mm device at L3-4.  So we had harvested bone marrow   aspirate from the left iliac crest by inserting the needle in the crest, suctioning off 15 mL of bone marrow aspirate which   was mixed with 30 mL of OsteoAMP. We injected about 3 mL of   OsteoAMP into each disk space using the OsteoAMP gun dispenser   and then we used a PEEK cage at L4-5 because of the size and we   inserted it from the left side, tamped it anteriorly and across   the midline. At L3-4, we were able use with the titanium cages   and tamped this in from the left side anteriorly and across the   midline, good fit at both and once we had done that, we   thoroughly irrigated the wound multiple times, suctioned it dry. We then placed our instrumentation. We determined the starting   points for L3, 4 and 5 cortical screws. We burred a starting   hole and then drilled this, probed it. No breaches were found. Tapped it, reprobed it, no breaches were found and we inserted   5.5 x 35 mm screws at all levels bilaterally, they had good   purchase. We then placed rods into the screws, placed the   locking nuts loosely compressed across both levels and torqued   the locking nuts tight. AP and lateral C-arm x-ray were   obtained, which showed good position of the screws, the   interbody devices and overall good alignment of the spine. We   placed a cross connector between the 4 and 5 screw and secured   it to the jose bilaterally. We then decorticated the transverse   processes lateral bony margins L3 to 5 with a high-speed bur. We   mixed local bone graft and OsteoAMP into each lateral gutter and   then placed the demineralized bone matrix on top of this   bilaterally. We then placed a Hemovac drain deep in the wound   and brought out it through a separate proximal stab incision. We   placed vancomycin powder 1 gram in the wound and then we were   ready to close. The deep fascia was reapproximated with   interrupted figure-of-eight stitches of #1 Vicryl.  Subcutaneous   tissue was closed over this with interrupted stitches of 2-0   Vicryl and the skin was closed with a running subcuticular   stitch of 3-0 Vicryl. Steri-Strips and dry sterile dressings   were applied. The patient was then rolled supine onto the   recovery room bed, having tolerated the procedure well.         MD BRENDEN Doshi / Andrea New   D:  10/09/2017   17:50   T:  10/09/2017   19:27   Job #:  [id number]

## 2017-10-09 NOTE — PERIOP NOTES
TRANSFER - OUT REPORT:    Verbal report given to Karishma Rodriguez RN on Marika Sandy  being transferred to [facility] for routine post - op       Report consisted of patients Situation, Background, Assessment and   Recommendations(SBAR). Information from the following report(s) Procedure Summary, Intake/Output, MAR and Cardiac Rhythm A Fib was reviewed with the receiving nurse. Lines:   Peripheral IV 10/09/17 Left Forearm (Active)   Site Assessment Clean, dry, & intact 10/9/2017  6:52 PM   Phlebitis Assessment 0 10/9/2017  6:52 PM   Infiltration Assessment 0 10/9/2017  6:52 PM   Dressing Status Clean, dry, & intact 10/9/2017  6:52 PM   Dressing Type Tape;Transparent 10/9/2017  6:52 PM   Hub Color/Line Status Green; Infusing 10/9/2017  6:52 PM       Peripheral IV 10/09/17 Right Hand (Active)   Site Assessment Clean, dry, & intact 10/9/2017  6:52 PM   Phlebitis Assessment 0 10/9/2017  6:52 PM   Infiltration Assessment 0 10/9/2017  6:52 PM   Dressing Status Clean, dry, & intact 10/9/2017  6:52 PM   Dressing Type Tape;Transparent 10/9/2017  6:52 PM   Hub Color/Line Status Green;Capped;Flushed 10/9/2017  6:52 PM        Opportunity for questions and clarification was provided. Patient transported with:   O2 @ 2 liters  Tech    VTE prophylaxis orders have been written for Marika Sandy. Patient and family given floor number and nurses name. Family updated re: pt status after security code verified.  u

## 2017-10-09 NOTE — ANESTHESIA POSTPROCEDURE EVALUATION
Post-Anesthesia Evaluation and Assessment    Patient: Abad Liu MRN: [id number]  SSN: [id number]    YOB: 1960  Age: 56 y.o. Sex: male       Cardiovascular Function/Vital Signs  Visit Vitals    /88    Pulse 80    Temp 36.9 °C (98.5 °F)    Resp 10    Ht 6' 4\" (1.93 m)    Wt 99.4 kg (219 lb 3 oz)    SpO2 97%    BMI 26.68 kg/m2       Patient is status post general anesthesia for Procedure(s):  L3-L5 LAMI FUSION SPINE  (TLIF). Nausea/Vomiting: None    Postoperative hydration reviewed and adequate. Pain:  Pain Scale 1: Numeric (0 - 10) (10/09/17 1858)  Pain Intensity 1: 6 (10/09/17 1858)   Managed    Neurological Status:   Neuro (WDL): Within Defined Limits (10/09/17 1852)   At baseline    Mental Status and Level of Consciousness: Arousable    Pulmonary Status:   O2 Device: Nasal cannula (10/09/17 1852)   Adequate oxygenation and airway patent    Complications related to anesthesia: None    Post-anesthesia assessment completed.  No concerns    Signed By: Cynthia Kumar MD     October 9, 2017

## 2017-10-09 NOTE — IP AVS SNAPSHOT
Suleiman Marks 
 
 
 [address] 
[phone] Patient: Tino Low MRN: [id number] LKM:7/01/[birth year]4 You are allergic to the following No active allergies Immunizations Administered for This Admission Name Date Influenza Vaccine (Quad) PF 10/12/2017 Recent Documentation Height Weight BMI Smoking Status 1.93 m 99.4 kg 26.68 kg/m2 Former Smoker Emergency Contacts Name Discharge Info Relation Home Work Mobile AsaJennifer DISCHARGE CAREGIVER [3] Girlfriend [18] [phone] About your hospitalization You were admitted on:  October 9, 2017 You last received care in the:  [facility] 7 MED SURG You were discharged on:  October 12, 2017 Unit phone number:  [phone] Why you were hospitalized Your primary diagnosis was:  Spinal Stenosis Of Lumbar Region Your diagnoses also included:  Spondylolisthesis Of Lumbar Region, Spinal Stenosis Providers Seen During Your Hospitalizations Provider Role Specialty Primary office phone Stephy Byrd MD Attending Provider Orthopedic Surgery [phone] Your Primary Care Physician (PCP) Primary Care Physician Office Phone Office Fax [address] [facility] [place], [address] [facility] [phone] Follow-up Information Follow up With Details Comments Contact Info Nas Flores MD Call As needed Doctor Jose De Jesus [address] [facility] [address] 
[phone] Stephy Byrd MD On 10/24/2017 10:10 AM @ [place] [address] [facility] [address] 
[phone] Current Discharge Medication List  
  
CONTINUE these medications which have NOT CHANGED Dose & Instructions Dispensing Information Comments Morning Noon Evening Bedtime  
 amLODIPine 10 mg tablet Commonly known as:  Claudio Renteria Your next dose is:  Tomorrow Morning  Dose:  10 mg  
 Take 1 Tab by mouth daily. Quantity:  90 Tab Refills:  1  
     
  
   
   
   
  
 aspirin delayed-release 81 mg tablet Your next dose is:  Tomorrow Morning Dose:  81 mg Take 81 mg by mouth every morning. Refills:  0  
     
  
   
   
   
  
 atorvastatin 20 mg tablet Commonly known as:  LIPITOR Your next dose is:  Tomorrow Morning Dose:  20 mg Take 1 Tab by mouth daily. Quantity:  90 Tab Refills:  1 FLUoxetine 20 mg capsule Commonly known as:  PROzac Your next dose is:  Tomorrow Morning Dose:  20 mg Take 1 Cap by mouth daily. Quantity:  90 Cap Refills:  1 HYDROcodone-acetaminophen  mg tablet Commonly known as:  Rolinda Doles Your next dose is: Take on as needed schedule Dose:  1 Tab Take 1 Tab by mouth every eight (8) hours as needed for Pain. Quantity:  90 Tab Refills:  0  
     
   
   
   
  
 metFORMIN 500 mg tablet Commonly known as:  GLUCOPHAGE Your next dose is:  Tomorrow Morning Dose:  500 mg Take 500 mg by mouth daily (with breakfast). Refills:  0 Discharge Instructions MAY shower (may shower with dressing on)-->NO tub baths LEAVE dressing on incision for 3 DAYS-->then may remove (If dressing starts to fall off may re-secure with tape) NO lifting anything heavier than 5LBS  
 
NO Bending, Lifting or Twisting WEAR your brace if prescribed at all times EXCEPT when in bed, just going to the 
 bathroom, or showering Avoid sitting more than 20 - 30 minutes at at time NO driving until directed by your doctor DO NOT take any NSAIDS (either prescribed or over the counter until directed (Aleve, Ibuprofen, Mobic, etc) as this will interfere with bone healing CALL the doctor if:  Fever >100.5 
([phone])                 Incision becomes red, swollen or opens up Incision has yellow, thick drainage or an odor Pain is not managed with prescribed medications Excessive nausea and/or vomiting Avoid having pets sleep in bed with you until incision is completely healed DISCHARGE SUMMARY from Nurse The following personal items are in your possession at time of discharge: 
 
Dental Appliances: None Jewelry: None Clothing: Shirt, Pants, Footwear, Undergarments Other Valuables: None PATIENT INSTRUCTIONS: 
 
 
F-face looks uneven A-arms unable to move or move unevenly S-speech slurred or non-existent T-time-call 911 as soon as signs and symptoms begin-DO NOT go Back to bed or wait to see if you get better-TIME IS BRAIN. Warning Signs of HEART ATTACK Call 911 if you have these symptoms: 
? Chest discomfort. Most heart attacks involve discomfort in the center of the chest that lasts more than a few minutes, or that goes away and comes back. It can feel like uncomfortable pressure, squeezing, fullness, or pain. ? Discomfort in other areas of the upper body. Symptoms can include pain or discomfort in one or both arms, the back, neck, jaw, or stomach. ? Shortness of breath with or without chest discomfort. ? Other signs may include breaking out in a cold sweat, nausea, or lightheadedness. Don't wait more than five minutes to call [address]! Fast action can save your life. Calling 911 is almost always the fastest way to get lifesaving treatment. Emergency Medical Services staff can begin treatment when they arrive  up to an hour sooner than if someone gets to the hospital by car. The discharge information has been reviewed with the patient. The patient verbalized understanding.  
 
Discharge medications reviewed with the patient and appropriate educational materials and side effects teaching were provided. Discharge Orders None Introducing [facility] [place] [facility]! [facility] introduces SensorTran patient portal. Now you can access parts of your medical record, email your doctor's office, and request medication refills online. 1. In your internet browser, go to [email or web address] 2. Click on the First Time User? Click Here link in the Sign In box. You will see the New Member Sign Up page. 3. Enter your SensorTran Access Code exactly as it appears below. You will not need to use this code after youve completed the sign-up process. If you do not sign up before the expiration date, you must request a new code. · SensorTran Access Code: [email or web address] Expires: 12/4/2017  2:40 PM 
 
4. Enter the last four digits of your Social Security Number (xxxx) and Date of Birth (mm/dd/yyyy) as indicated and click Submit. You will be taken to the next sign-up page. 5. Create a SensorTran ID. This will be your SensorTran login ID and cannot be changed, so think of one that is secure and easy to remember. 6. Create a SensorTran password. You can change your password at any time. 7. Enter your Password Reset Question and Answer. This can be used at a later time if you forget your password. 8. Enter your e-mail address. You will receive e-mail notification when new information is available in [address] 9. Click Sign Up. You can now view and download portions of your medical record. 10. Click the Download Summary menu link to download a portable copy of your medical information. If you have questions, please visit the Frequently Asked Questions section of the S[employer]Tran website. Remember, SensorTran is NOT to be used for urgent needs. For medical emergencies, dial 911. Now available from your iPhone and Android! General Information Please provide this summary of care documentation to your next provider. Patient Signature:  ____________________________________________________________ Date:  ____________________________________________________________  
  
Karen Radha Provider Signature:  ____________________________________________________________ Date:  ____________________________________________________________

## 2017-10-10 LAB
BASOPHILS # BLD: 0 K/UL (ref 0–0.2)
BASOPHILS NFR BLD: 0 % (ref 0–2)
DIFFERENTIAL METHOD BLD: ABNORMAL
EOSINOPHIL # BLD: 0.2 K/UL (ref 0–0.8)
EOSINOPHIL NFR BLD: 2 % (ref 0.5–7.8)
ERYTHROCYTE [DISTWIDTH] IN BLOOD BY AUTOMATED COUNT: 13.1 % (ref 11.9–14.6)
HCT VFR BLD AUTO: 38.1 % (ref 41.1–50.3)
HGB BLD-MCNC: 13.4 G/DL (ref 13.6–17.2)
IMM GRANULOCYTES # BLD: 0 K/UL (ref 0–0.5)
IMM GRANULOCYTES NFR BLD: 0.1 % (ref 0–5)
LYMPHOCYTES # BLD: 2.7 K/UL (ref 0.5–4.6)
LYMPHOCYTES NFR BLD: 30 % (ref 13–44)
MCH RBC QN AUTO: 28.9 PG (ref 26.1–32.9)
MCHC RBC AUTO-ENTMCNC: 35.2 G/DL (ref 31.4–35)
MCV RBC AUTO: 82.1 FL (ref 79.6–97.8)
MONOCYTES # BLD: 0.6 K/UL (ref 0.1–1.3)
MONOCYTES NFR BLD: 7 % (ref 4–12)
NEUTS SEG # BLD: 5.4 K/UL (ref 1.7–8.2)
NEUTS SEG NFR BLD: 61 % (ref 43–78)
PLATELET # BLD AUTO: 188 K/UL (ref 150–450)
PMV BLD AUTO: 10.1 FL (ref 10.8–14.1)
RBC # BLD AUTO: 4.64 M/UL (ref 4.23–5.67)
WBC # BLD AUTO: 8.8 K/UL (ref 4.3–11.1)

## 2017-10-10 PROCEDURE — 77010033678 HC OXYGEN DAILY

## 2017-10-10 PROCEDURE — 36415 COLL VENOUS BLD VENIPUNCTURE: CPT | Performed by: ORTHOPAEDIC SURGERY

## 2017-10-10 PROCEDURE — 97530 THERAPEUTIC ACTIVITIES: CPT

## 2017-10-10 PROCEDURE — 74011250637 HC RX REV CODE- 250/637: Performed by: ORTHOPAEDIC SURGERY

## 2017-10-10 PROCEDURE — 74011250636 HC RX REV CODE- 250/636: Performed by: ORTHOPAEDIC SURGERY

## 2017-10-10 PROCEDURE — 85025 COMPLETE CBC W/AUTO DIFF WBC: CPT | Performed by: ORTHOPAEDIC SURGERY

## 2017-10-10 PROCEDURE — 65270000029 HC RM PRIVATE

## 2017-10-10 PROCEDURE — 97161 PT EVAL LOW COMPLEX 20 MIN: CPT

## 2017-10-10 PROCEDURE — 94760 N-INVAS EAR/PLS OXIMETRY 1: CPT

## 2017-10-10 RX ADMIN — SODIUM CHLORIDE, SODIUM LACTATE, POTASSIUM CHLORIDE, AND CALCIUM CHLORIDE 100 ML/HR: 600; 310; 30; 20 INJECTION, SOLUTION INTRAVENOUS at 09:00

## 2017-10-10 RX ADMIN — AMLODIPINE BESYLATE 10 MG: 10 TABLET ORAL at 08:56

## 2017-10-10 RX ADMIN — Medication 10 ML: at 21:06

## 2017-10-10 RX ADMIN — OXYCODONE HYDROCHLORIDE AND ACETAMINOPHEN 1 TABLET: 10; 325 TABLET ORAL at 22:57

## 2017-10-10 RX ADMIN — Medication 5 ML: at 04:33

## 2017-10-10 RX ADMIN — OXYCODONE HYDROCHLORIDE AND ACETAMINOPHEN 1 TABLET: 10; 325 TABLET ORAL at 18:43

## 2017-10-10 RX ADMIN — MAGNESIUM HYDROXIDE 30 ML: 400 SUSPENSION ORAL at 08:56

## 2017-10-10 RX ADMIN — IBUPROFEN 400 MG: 400 TABLET, FILM COATED ORAL at 22:57

## 2017-10-10 RX ADMIN — CEFAZOLIN 2 G: 1 INJECTION, POWDER, FOR SOLUTION INTRAMUSCULAR; INTRAVENOUS; PARENTERAL at 01:20

## 2017-10-10 RX ADMIN — CEFAZOLIN 2 G: 1 INJECTION, POWDER, FOR SOLUTION INTRAMUSCULAR; INTRAVENOUS; PARENTERAL at 08:56

## 2017-10-10 RX ADMIN — FAMOTIDINE 20 MG: 20 TABLET, FILM COATED ORAL at 08:55

## 2017-10-10 RX ADMIN — ATORVASTATIN CALCIUM 20 MG: 10 TABLET, FILM COATED ORAL at 08:56

## 2017-10-10 RX ADMIN — CEFAZOLIN 2 G: 1 INJECTION, POWDER, FOR SOLUTION INTRAMUSCULAR; INTRAVENOUS; PARENTERAL at 16:41

## 2017-10-10 RX ADMIN — METFORMIN HYDROCHLORIDE 500 MG: 500 TABLET, FILM COATED ORAL at 08:56

## 2017-10-10 RX ADMIN — Medication 5 ML: at 14:00

## 2017-10-10 RX ADMIN — FLUOXETINE 20 MG: 20 CAPSULE ORAL at 08:56

## 2017-10-10 RX ADMIN — FAMOTIDINE 20 MG: 20 TABLET, FILM COATED ORAL at 21:06

## 2017-10-10 RX ADMIN — OXYCODONE HYDROCHLORIDE AND ACETAMINOPHEN 1 TABLET: 10; 325 TABLET ORAL at 08:56

## 2017-10-10 RX ADMIN — OXYCODONE HYDROCHLORIDE AND ACETAMINOPHEN 1 TABLET: 10; 325 TABLET ORAL at 04:32

## 2017-10-10 RX ADMIN — CHLORASEPTIC 1 SPRAY: 1.5 LIQUID ORAL at 04:34

## 2017-10-10 NOTE — PROGRESS NOTES
Dual Skin Assessment    Skin assessment completed with Ariadne HICKEY. Patient has a surgical incision to back with a hemovac attached, See below. Patient has an abdominal scar. No other skin issues noted. Wound Spine Posterior (Active)   DRESSING STATUS Clean, dry, and intact 10/9/2017  6:52 PM   DRESSING TYPE 4 x 4;Adhesive wound closure strips (Steri-Strips); Adhesive wound dressing (Mastisol); Transparent film 10/9/2017  1:00 PM        Darlyn Mari RN    10/9/2017 11:12 PM

## 2017-10-10 NOTE — PROGRESS NOTES
TRANSFER - IN REPORT:    Verbal report received from [facility] on Anisa Perez  being received from PACU for routine post - op      Report consisted of patients Situation, Background, Assessment and   Recommendations(SBAR). Information from the following report(s) SBAR, Kardex, OR Summary, MAR and Recent Results was reviewed with the receiving nurse. Opportunity for questions and clarification was provided. Assessment completed upon patients arrival to unit and care assumed.

## 2017-10-10 NOTE — PROGRESS NOTES
Problem: Falls - Risk of  Goal: *Absence of Falls  Document Eliot Fall Risk and appropriate interventions in the flowsheet.    Outcome: Progressing Towards Goal  Fall Risk Interventions:  Mobility Interventions: Bed/chair exit alarm, OT consult for ADLs, Patient to call before getting OOB, PT Consult for mobility concerns, Strengthening exercises (ROM-active/passive), PT Consult for assist device competence, Utilize walker, cane, or other assitive device           Medication Interventions: Bed/chair exit alarm, Evaluate medications/consider consulting pharmacy     Elimination Interventions: Bed/chair exit alarm, Call light in reach

## 2017-10-10 NOTE — PROGRESS NOTES
Problem: Mobility Impaired (Adult and Pediatric)  Goal: *Acute Goals and Plan of Care (Insert Text)  Discharge Goals:  (1.)Mr. Ariadna Taylor will move from supine to sit and sit to supine , scoot up and down and roll side to side with MODIFIED INDEPENDENCE within 3 day(s). (2.)Mr. Ariadna Taylor will transfer from bed to chair and chair to bed with MODIFIED INDEPENDENCE using the least restrictive device within 3 day(s). (3.)Mr. Caruso will ambulate with MODIFIED INDEPENDENCE for 250 feet with the least restrictive device within 3 day(s). (4.)Mr. Ariadna Taylor will verbalize spinal precautions with 0 verbal/manual cues within 3 day(s). (5.)Mr. Ariadna Taylor will participate in 10 minutes of LE exercise to address activity tolerance with MIN verbal/manual cues to maintain proper form within 3 day(s). ________________________________________________________________________________________________      PHYSICAL THERAPY: INITIAL ASSESSMENT, AM 10/10/2017  INPATIENT: Hospital Day: 2  Payor: [employer] / Plan: [address] [place] [address] HMO / Product Type: Managed Care Medicare /      NAME/AGE/GENDER: Darlyn Ruiz is a 56 y.o. male     PRIMARY DIAGNOSIS: Lumbar stenosis [M48.061]  Spondylolisthesis of lumbar region [M43.16]  Weakness of both lower extremities [R29.898] Spinal stenosis of lumbar region Spinal stenosis of lumbar region  Procedure(s) (LRB):  L3-L5 LAMI FUSION SPINE  (TLIF) (N/A)  1 Day Post-Op  ICD-10: Treatment Diagnosis:       · Generalized Muscle Weakness (M62.81)  · Other lack of cordination (R27.8)  · Other abnormalities of gait and mobility (R26.89)   Precaution/Allergies:  Review of patient's allergies indicates no known allergies. ASSESSMENT:      Mr. Ariadna Taylor is a 56 y.o. male admitted for L3-L5 spinal fusion. He presents to therapy in supine without any pain, \"just sore\". Mr. Ariadna Taylor lives alone in a single-story home without any steps to enter, however, his girlfriend will be staying with him during recovery.  He was previously using a cane, but would like a rolling walker as per MD advice. He was able to independently don back brace. He was able to move from supine to sit to stand with CGA. After 6' of ambulation with RW, Mr. Toribio Severino became faint and sat in chair with MIN ASSIST. BP at 111/70, SpO2 at 98, HR at 94 bpm, and RN notified. He was able to ambulate back to bed and complete bed mobility with CGA. Tino Low is currently functioning below his baseline and would benefit from skilled PT during acute care stay to maximize safety and independence with functional mobility. This section established at most recent assessment   PROBLEM LIST (Impairments causing functional limitations):  1. Decreased Strength  2. Decreased ADL/Functional Activities  3. Decreased Transfer Abilities  4. Decreased Ambulation Ability/Technique  5. Decreased Balance  6. Decreased Activity Tolerance  7. Increased Fatigue  8. Decreased Knowledge of Precautions  9. Decreased [place] with Home Exercise Program    INTERVENTIONS PLANNED: (Benefits and precautions of physical therapy have been discussed with the patient.)  1. Balance Exercise  2. Bed Mobility  3. Gait Training  4. Home Exercise Program (HEP)  5. Neuromuscular Re-education/Strengthening  6. Therapeutic Activites  7. Therapeutic Exercise/Strengthening  8. Transfer Training  9. Group Therapy      TREATMENT PLAN: Frequency/Duration: twice daily for duration of hospital stay  Rehabilitation Potential For Stated Goals: GOOD      RECOMMENDED REHABILITATION/EQUIPMENT: (at time of discharge pending progress): Due to the probability of continued deficits (see above) this patient will likely need continued skilled physical therapy after discharge. Equipment:   · Walkers, Type: Rolling Walker                   HISTORY:   History of Present Injury/Illness (Reason for Referral):   \"HISTORY OF PRESENT ILLNESS: He was last seen in the office   08/25/2017 for low back pain and bilateral leg pain and weakness   and nothing has changed in his review of systems or history   since that time, so for details on review of systems, refer to   that H and P. He is a 79-year-old gentleman on disability   because of back and leg pain. He has spinal stenosis. He did not   get better with conservative treatment. He also has a   spondylolisthesis at L4-L5. Because of his failure to get better,   he is being brought for surgical treatment. His medical problems   are high blood pressure, elevated cholesterol\"  Past Medical History/Comorbidities:   Mr. José Antonio Adkins  has a past medical history of Arthritis; Chronic back pain; Colon cancer ([facility] [address] (1990); Depression (05/14/2015); Diabetes ([facility] [address] Former cigarette smoker; HTN (hypertension) (04/28/2014); and Hypercholesteremia. He also has no past medical history of Coagulation defects; COPD; Other ill-defined conditions(799.89); or Unspecified adverse effect of anesthesia. Mr. José Antonio Adkins  has a past surgical history that includes small bowel resection (1990); lap cholecystectomy; colonoscopy; and wisdom teeth extraction. Social History/Living Environment:   Home Environment: Private residence  # Steps to Enter: 0  One/Two Story Residence: One story  Living Alone: Yes  Support Systems: Spouse/Significant Other/Partner  Patient Expects to be Discharged to[de-identified] Private residence (with girlfriend )  Current DME Used/Available at Home: Cane, straight  Tub or Shower Type: Tub/Shower combination  Prior Level of Function/Work/Activity:  Pt was independent with functional mobility, transfers, ambulation, and ADLs prior to admission.        Number of Personal Factors/Comorbidities that affect the Plan of Care: 1-2: MODERATE COMPLEXITY   EXAMINATION:   Most Recent Physical Functioning:   Gross Assessment:  AROM: Generally decreased, functional  PROM: Generally decreased, functional  Strength: Generally decreased, functional  Tone: Normal  Sensation: Impaired               Posture:  Posture (WDL): Exceptions to WDL  Posture Assessment: Forward head, Rounded shoulders  Balance:  Sitting: Intact  Standing: Impaired  Standing - Static: Good  Standing - Dynamic : Fair Bed Mobility:  Rolling: Stand-by asssistance  Supine to Sit: Contact guard assistance  Sit to Supine: Contact guard assistance  Wheelchair Mobility:     Transfers:  Sit to Stand: Contact guard assistance  Stand to Sit: Contact guard assistance  Gait:     Base of Support: Narrowed  Speed/Mari: Slow  Step Length: Left shortened;Right shortened  Gait Abnormalities: Decreased step clearance (B Knee flexion)  Distance (ft): 8 Feet (ft)  Assistive Device: Walker, rolling  Ambulation - Level of Assistance: Contact guard assistance  Stairs - Level of Assistance: Contact guard assistance       Body Structures Involved:  1. Nerves  2. Bones  3. Joints  4. Muscles Body Functions Affected:  1. Sensory/Pain  2. Neuromusculoskeletal  3. Movement Related  4. Skin Related Activities and Participation Affected:  1. General Tasks and Demands  2. Mobility  3. Self Care  4. Domestic Life  5. Interpersonal Interactions and Relationships  6. Community, Social and [place]   Number of elements that affect the Plan of Care: 3: MODERATE COMPLEXITY   CLINICAL PRESENTATION:   Presentation: Stable and uncomplicated: LOW COMPLEXITY   CLINICAL DECISION MAKING:   [employer] [facility] 6 Clicks   Basic Mobility Inpatient Short Form  How much difficulty does the patient currently have. .. Unable A Lot A Little None   1. Turning over in bed (including adjusting bedclothes, sheets and blankets)? [ ] 1   [ ] 2   [X] 3   [ ] 4   2. Sitting down on and standing up from a chair with arms ( e.g., wheelchair, bedside commode, etc.)   [ ] 1   [ ] 2   [X] 3   [ ] 4   3. Moving from lying on back to sitting on the side of the bed? [ ] 1   [ ] 2   [X] 3   [ ] 4   How much help from another person does the patient currently need. .. Total A Lot A Little None   4.   Moving to and from a bed to a chair (including a wheelchair)? [ ] 1   [ ] 2   [X] 3   [ ] 4   5. Need to walk in hospital room? [ ] 1   [ ] 2   [X] 3   [ ] 4   6. Climbing 3-5 steps with a railing? [ ] 1   [ ] 2   [X] 3   [ ] 4   © 2007, Trustees of [address] [place] [address] [address] [address], under license to [employer]. All rights reserved    Score:  Initial: 18 Most Recent: X (Date: -- )     Interpretation of Tool:  Represents activities that are increasingly more difficult (i.e. Bed mobility, Transfers, Gait). Score 24 23 22-20 19-15 14-10 9-7 6       Modifier CH CI CJ CK CL CM CN         · Mobility - Walking and Moving Around:               - CURRENT STATUS:    CK - 40%-59% impaired, limited or restricted               - GOAL STATUS:           CJ - 20%-39% impaired, limited or restricted               - D/C STATUS:                       ---------------To be determined---------------  Payor: [employer] MEDICARE / Plan: [address] [place] [address] HMO / Product Type: Managed Care Medicare /       Medical Necessity:     · Skilled intervention continues to be required due to decline in functional mobility, ambulation, and ability to complete ADLs independently and safely. .  Reason for Services/Other Comments:  · Patient continues to require skilled intervention due to medical complications and patient unable to attend/participate in therapy as expected. Use of outcome tool(s) and clinical judgement create a POC that gives a: Clear prediction of patient's progress: LOW COMPLEXITY                 TREATMENT:   (In addition to Assessment/Re-Assessment sessions the following treatments were rendered)   Pre-treatment Symptoms/Complaints:   \"No pain, just a little sore\"  Pain: Initial:   Pain Intensity 1: 0  Post Session:  0/10      Assessment/Reassessment only, no treatment provided today     Braces/Orthotics/Lines/Etc:   · IV  · O2 Device: Nasal cannula  Treatment/Session Assessment:    · Response to Treatment:  Pt tolerant to treatment and presents with good mobility, but was limited due to fatigue following ambulation. · Interdisciplinary Collaboration:  · Physical Therapist  · Registered Nurse  · SPT  · After treatment position/precautions:  · Supine in bed  · Bed alarm/tab alert on  · Bed/Chair-wheels locked  · Bed in low position  · Call light within reach  · RN notified  · Compliance with Program/Exercises: Will assess as treatment progresses. · Recommendations/Intent for next treatment session: \"Next visit will focus on advancements to more challenging activities and reduction in assistance provided\".   Total Treatment Duration:  PT Patient Time In/Time Out  Time In: 1020  Time Out: 1430 [address]

## 2017-10-10 NOTE — PROGRESS NOTES
Problem: Falls - Risk of  Goal: *Absence of Falls  Document Eliot Fall Risk and appropriate interventions in the flowsheet.    Outcome: Progressing Towards Goal  Fall Risk Interventions:  Mobility Interventions: Bed/chair exit alarm, OT consult for ADLs, Patient to call before getting OOB, PT Consult for assist device competence, PT Consult for mobility concerns, Utilize walker, cane, or other assitive device           Medication Interventions: Assess postural VS orthostatic hypotension, Bed/chair exit alarm, Evaluate medications/consider consulting pharmacy, Patient to call before getting OOB     Elimination Interventions: Bed/chair exit alarm, Call light in reach, Patient to call for help with toileting needs, Toilet paper/wipes in reach

## 2017-10-10 NOTE — PROGRESS NOTES
ORTH POST OP PROGRESS NOTE    October 10, 2017  Admit Date: 10/9/2017  Admit Diagnosis: Lumbar stenosis [M48.061]  Spondylolisthesis of lumbar region [M43.16]  Weakness of both lower extremities [R29.898]  Procedure: Procedure(s):  L3-L5 LAMI FUSION SPINE  (TLIF)  Post Op day: 1 Day Post-Op      Subjective:     Jazzmine Melton is a patient who has no complaints. Objective:     Vital Signs:    Blood pressure 122/84, pulse 81, temperature 97.6 °F (36.4 °C), resp. rate 18, height 6' 4\" (1.93 m), weight 99.4 kg (219 lb 3 oz), SpO2 93 %. Temp (24hrs), Av.4 °F (36.9 °C), Min:97.6 °F (36.4 °C), Max:99.8 °F (37.7 °C)      10/10 0701 - 10/10 1900  In: -   Out: 1900 [Urine:1750; Drains:150]  10/08 1901 - 10/10 0700  In: 9819 [P.O.:300; I.V.:3875]  Out: 1525 [Urine:305; Drains:620]    LAB:    Recent Labs      10/10/17   0608   HGB  13.4*   WBC  8.8   PLT  188       Physical Exam    General:   Alert and oriented. No acute distress  Lungs:  Respirations unlabored. Extremities: No evidence of cyanosis. Calves soft, nontender. Moves both upper and lower extremities.    Dressing:  clean, dry, and intact  Neuro:  no deficit      Assessment:      Patient Active Problem List   Diagnosis Code    Essential hypertension I10    HLD (hyperlipidemia) E78.5    Hyperglycemia R73.9    Vitamin D deficiency E55.9    Depression F32.9    Chronic back pain M54.9, G89.29    Spinal stenosis of lumbar region M48.061    Spondylolisthesis of lumbar region M43.16    Spinal stenosis M48.00       Plan:     Continue PT  Discontinue: abel  Consult: none    Anticipate Discharge To: HOME Wed or Thursday       Signed By: Amber Horton PA-C

## 2017-10-10 NOTE — PROGRESS NOTES
Problem: Mobility Impaired (Adult and Pediatric)  Goal: *Acute Goals and Plan of Care (Insert Text)  Discharge Goals:  (1.)Mr. Steffi Rausch will move from supine to sit and sit to supine , scoot up and down and roll side to side with MODIFIED INDEPENDENCE within 3 day(s). (2.)Mr. Steffi Rausch will transfer from bed to chair and chair to bed with MODIFIED INDEPENDENCE using the least restrictive device within 3 day(s). (3.)Mr. Caruso will ambulate with MODIFIED INDEPENDENCE for 250 feet with the least restrictive device within 3 day(s). (4.)Mr. Steffi Rausch will verbalize spinal precautions with 0 verbal/manual cues within 3 day(s). (5.)Mr. Steffi Rausch will participate in 10 minutes of LE exercise to address activity tolerance with MIN verbal/manual cues to maintain proper form within 3 day(s). ________________________________________________________________________________________________      PHYSICAL THERAPY: Treatment Day: Day of Assessment and PM 10/10/2017  INPATIENT: Hospital Day: 2  Payor: Gerry Harvey / Plan: [address] [place] [address] HMO / Product Type: [employer] Medicare /      NAME/AGE/GENDER: Elza Richardson is a 56 y.o. male     PRIMARY DIAGNOSIS: Lumbar stenosis [M48.061]  Spondylolisthesis of lumbar region [M43.16]  Weakness of both lower extremities [R29.898] Spinal stenosis of lumbar region Spinal stenosis of lumbar region  Procedure(s) (LRB):  L3-L5 LAMI FUSION SPINE  (TLIF) (N/A)  1 Day Post-Op  ICD-10: Treatment Diagnosis:       · Generalized Muscle Weakness (M62.81)  · Other lack of cordination (R27.8)  · Other abnormalities of gait and mobility (R26.89)   Precaution/Allergies:  Review of patient's allergies indicates no known allergies. ASSESSMENT:      Mr. Steffi Rausch is a 56 y.o. male admitted for L3-L5 spinal fusion. He presents to therapy in supine without any pain. He was able to move from supine to sit to stand with CGA. Pt able to don back brace independently.  After 8' of ambulation with RW and CGA, . Shady's knees began to buckle so he sat in chair with MIN ASSIST. Noted narrow DOE almost heel-toe steps, ambulating on medial and lateral aspects of feet R>L and noted R toe flexion weakness 3+/5. He was able to ambulate back to bed with MIN ASSIST and complete bed mobility with CGA. He was educated on importance of calling RN for ambulation assistance to bathroom. Left in supine with posey on, call light within reach, and RN notified. Darlyn Ruiz is currently functioning below his baseline and would benefit from skilled PT during acute care stay to maximize safety and independence with functional mobility. This section established at most recent assessment   PROBLEM LIST (Impairments causing functional limitations):  1. Decreased Strength  2. Decreased ADL/Functional Activities  3. Decreased Transfer Abilities  4. Decreased Ambulation Ability/Technique  5. Decreased Balance  6. Decreased Activity Tolerance  7. Increased Fatigue  8. Decreased Knowledge of Precautions  9. Decreased [place] with Home Exercise Program    INTERVENTIONS PLANNED: (Benefits and precautions of physical therapy have been discussed with the patient.)  1. Balance Exercise  2. Bed Mobility  3. Gait Training  4. Home Exercise Program (HEP)  5. Neuromuscular Re-education/Strengthening  6. Therapeutic Activites  7. Therapeutic Exercise/Strengthening  8. Transfer Training  9. Group Therapy      TREATMENT PLAN: Frequency/Duration: twice daily for duration of hospital stay  Rehabilitation Potential For Stated Goals: GOOD      RECOMMENDED REHABILITATION/EQUIPMENT: (at time of discharge pending progress): Due to the probability of continued deficits (see above) this patient will likely need continued skilled physical therapy after discharge. Equipment:   · Walkers, Type: Rolling Walker                   HISTORY:   History of Present Injury/Illness (Reason for Referral):   \"HISTORY OF PRESENT ILLNESS: He was last seen in the office 08/25/2017 for low back pain and bilateral leg pain and weakness   and nothing has changed in his review of systems or history   since that time, so for details on review of systems, refer to   that H and P. He is a 68-year-old gentleman on disability   because of back and leg pain. He has spinal stenosis. He did not   get better with conservative treatment. He also has a   spondylolisthesis at L4-L5. Because of his failure to get better,   he is being brought for surgical treatment. His medical problems   are high blood pressure, elevated cholesterol\"  Past Medical History/Comorbidities:   Mr. Katie Dominguez  has a past medical history of Arthritis; Chronic back pain; Colon cancer ([facility] (1990); Depression (05/14/2015); Diabetes ([facility] Former cigarette smoker; HTN (hypertension) (04/28/2014); and Hypercholesteremia. He also has no past medical history of Coagulation defects; COPD; Other ill-defined conditions(799.89); or Unspecified adverse effect of anesthesia. Mr. Katie Dominguez  has a past surgical history that includes small bowel resection (1990); lap cholecystectomy; colonoscopy; and wisdom teeth extraction. Social History/Living Environment:   Home Environment: Private residence  # Steps to Enter: 0  One/Two Story Residence: One story  Living Alone: Yes  Support Systems: Spouse/Significant Other/Partner  Patient Expects to be Discharged to[de-identified] Private residence (with girlfriend )  Current DME Used/Available at Home: Cane, straight  Tub or Shower Type: Tub/Shower combination  Prior Level of Function/Work/Activity:  Pt was independent with functional mobility, transfers, ambulation, and ADLs prior to admission.        Number of Personal Factors/Comorbidities that affect the Plan of Care: 1-2: MODERATE COMPLEXITY   EXAMINATION:   Most Recent Physical Functioning:   Gross Assessment:  AROM: Generally decreased, functional  PROM: Generally decreased, functional  Strength: Generally decreased, functional  Tone: Normal  Sensation: Impaired               Posture:  Posture (WDL): Exceptions to WDL  Posture Assessment: Forward head, Rounded shoulders  Balance:  Sitting: Intact  Standing: Impaired  Standing - Static: Good  Standing - Dynamic : Fair Bed Mobility:  Rolling: Stand-by asssistance  Supine to Sit: Contact guard assistance  Sit to Supine: Contact guard assistance  Wheelchair Mobility:     Transfers:  Sit to Stand: Contact guard assistance  Stand to Sit: Contact guard assistance  Gait:     Base of Support: Narrowed  Speed/Mari: Slow;Shuffled  Step Length: Left shortened;Right shortened  Gait Abnormalities: Decreased step clearance (; B knee flexion)  Distance (ft): 15 Feet (ft)  Assistive Device: Walker, rolling  Ambulation - Level of Assistance: Minimal assistance  Stairs - Level of Assistance: Contact guard assistance       Body Structures Involved:  1. Nerves  2. Bones  3. Joints  4. Muscles Body Functions Affected:  1. Sensory/Pain  2. Neuromusculoskeletal  3. Movement Related  4. Skin Related Activities and Participation Affected:  1. General Tasks and Demands  2. Mobility  3. Self Care  4. Domestic Life  5. Interpersonal Interactions and Relationships  6. Community, Social and [place]   Number of elements that affect the Plan of Care: 3: MODERATE COMPLEXITY   CLINICAL PRESENTATION:   Presentation: Stable and uncomplicated: LOW COMPLEXITY   CLINICAL DECISION MAKIN [place] [id number] AM-PAC 6 Clicks   Basic Mobility Inpatient Short Form  How much difficulty does the patient currently have. .. Unable A Lot A Little None   1. Turning over in bed (including adjusting bedclothes, sheets and blankets)? [ ] 1   [ ] 2   [X] 3   [ ] 4   2. Sitting down on and standing up from a chair with arms ( e.g., wheelchair, bedside commode, etc.)   [ ] 1   [ ] 2   [X] 3   [ ] 4   3. Moving from lying on back to sitting on the side of the bed?    [ ] 1   [ ] 2   [X] 3   [ ] 4   How much help from another person does the patient currently need... Total A Lot A Little None   4. Moving to and from a bed to a chair (including a wheelchair)? [ ] 1   [ ] 2   [X] 3   [ ] 4   5. Need to walk in hospital room? [ ] 1   [ ] 2   [X] 3   [ ] 4   6. Climbing 3-5 steps with a railing? [ ] 1   [ ] 2   [X] 3   [ ] 4   © 2007, Trustees of [address] [place] [address] [address] [address], under license to [employer]. All rights reserved    Score:  Initial: 18 Most Recent: X (Date: -- )     Interpretation of Tool:  Represents activities that are increasingly more difficult (i.e. Bed mobility, Transfers, Gait). Score 24 23 22-20 19-15 14-10 9-7 6       Modifier CH CI CJ CK CL CM CN         · Mobility - Walking and Moving Around:               - CURRENT STATUS:    CK - 40%-59% impaired, limited or restricted               - GOAL STATUS:           CJ - 20%-39% impaired, limited or restricted               - D/C STATUS:                       ---------------To be determined---------------  Payor: [employer] MEDICARE / Plan: [address] [place] [address] HMO / Product Type: Managed Care Medicare /       Medical Necessity:     · Skilled intervention continues to be required due to decline in functional mobility, ambulation, and ability to complete ADLs independently and safely. .  Reason for Services/Other Comments:  · Patient continues to require skilled intervention due to medical complications and patient unable to attend/participate in therapy as expected. Use of outcome tool(s) and clinical judgement create a POC that gives a: Clear prediction of patient's progress: LOW COMPLEXITY                 TREATMENT:   (In addition to Assessment/Re-Assessment sessions the following treatments were rendered)   Pre-treatment Symptoms/Complaints:  \"Still sore\"  Pain: Initial:   Pain Intensity 1: 0  Post Session:  Pain in legs (unable to verbalize)      Therapeutic Activity: (    30):   Therapeutic activities including Bed transfers, Chair transfers, Ambulation on level ground and sit to stands to improve mobility, strength and balance. Required minimal   to promote static and dynamic balance in standing. Date:   Date:   Date:     Activity/Exercise Parameters Parameters Parameters                                                        Braces/Orthotics/Lines/Etc:   · IV  · O2 Device: Nasal cannula  Treatment/Session Assessment:    · Response to Treatment:  Pt tolerant to treatment, but is a little impulsive with mobility/ambulation. · Interdisciplinary Collaboration:  · Physical Therapist  · Registered Nurse  · SPT  · After treatment position/precautions:  · Supine in bed, Bed alarm/tab alert on, Bed/Chair-wheels locked, Bed in low position, Call light within reach, RN notified and Visitors at bedside  · Compliance with Program/Exercises: Will assess as treatment progresses. · Recommendations/Intent for next treatment session: \"Next visit will focus on advancements to more challenging activities and reduction in assistance provided\".   Total Treatment Duration:PT Patient Time In/Time Out  Time In: 1410  Time Out: [address]

## 2017-10-11 PROCEDURE — 65270000029 HC RM PRIVATE

## 2017-10-11 PROCEDURE — 97530 THERAPEUTIC ACTIVITIES: CPT

## 2017-10-11 PROCEDURE — 74011250637 HC RX REV CODE- 250/637: Performed by: ORTHOPAEDIC SURGERY

## 2017-10-11 RX ADMIN — OXYCODONE HYDROCHLORIDE AND ACETAMINOPHEN 1 TABLET: 10; 325 TABLET ORAL at 08:14

## 2017-10-11 RX ADMIN — ATORVASTATIN CALCIUM 20 MG: 10 TABLET, FILM COATED ORAL at 08:13

## 2017-10-11 RX ADMIN — MAGNESIUM HYDROXIDE 30 ML: 400 SUSPENSION ORAL at 08:13

## 2017-10-11 RX ADMIN — AMLODIPINE BESYLATE 10 MG: 10 TABLET ORAL at 08:13

## 2017-10-11 RX ADMIN — FAMOTIDINE 20 MG: 20 TABLET, FILM COATED ORAL at 22:04

## 2017-10-11 RX ADMIN — OXYCODONE HYDROCHLORIDE AND ACETAMINOPHEN 1 TABLET: 10; 325 TABLET ORAL at 22:04

## 2017-10-11 RX ADMIN — FLUOXETINE 20 MG: 20 CAPSULE ORAL at 08:13

## 2017-10-11 RX ADMIN — Medication 5 ML: at 22:04

## 2017-10-11 RX ADMIN — METFORMIN HYDROCHLORIDE 500 MG: 500 TABLET, FILM COATED ORAL at 08:14

## 2017-10-11 RX ADMIN — Medication 10 ML: at 04:21

## 2017-10-11 RX ADMIN — OXYCODONE HYDROCHLORIDE AND ACETAMINOPHEN 1 TABLET: 10; 325 TABLET ORAL at 04:21

## 2017-10-11 RX ADMIN — FAMOTIDINE 20 MG: 20 TABLET, FILM COATED ORAL at 08:14

## 2017-10-11 RX ADMIN — OXYCODONE HYDROCHLORIDE AND ACETAMINOPHEN 1 TABLET: 10; 325 TABLET ORAL at 16:47

## 2017-10-11 RX ADMIN — Medication 10 ML: at 14:00

## 2017-10-11 NOTE — PROGRESS NOTES
Problem: Falls - Risk of  Goal: *Absence of Falls  Document Eliot Fall Risk and appropriate interventions in the flowsheet.    Outcome: Progressing Towards Goal  Fall Risk Interventions:  Mobility Interventions: Bed/chair exit alarm, Patient to call before getting OOB, PT Consult for mobility concerns, PT Consult for assist device competence, Strengthening exercises (ROM-active/passive), Utilize walker, cane, or other assitive device           Medication Interventions: Bed/chair exit alarm, Evaluate medications/consider consulting pharmacy     Elimination Interventions: Bed/chair exit alarm, Call light in reach, Patient to call for help with toileting needs

## 2017-10-11 NOTE — PROGRESS NOTES
Problem: Falls - Risk of  Goal: *Absence of Falls  Document Eliot Fall Risk and appropriate interventions in the flowsheet.    Outcome: Progressing Towards Goal  Fall Risk Interventions:  Mobility Interventions: Bed/chair exit alarm, OT consult for ADLs, Patient to call before getting OOB, PT Consult for mobility concerns, PT Consult for assist device competence, Strengthening exercises (ROM-active/passive), Utilize walker, cane, or other assitive device           Medication Interventions: Assess postural VS orthostatic hypotension, Bed/chair exit alarm, Evaluate medications/consider consulting pharmacy, Patient to call before getting OOB     Elimination Interventions: Bed/chair exit alarm, Call light in reach, Patient to call for help with toileting needs, Toilet paper/wipes in reach

## 2017-10-11 NOTE — PROGRESS NOTES
Problem: Mobility Impaired (Adult and Pediatric)  Goal: *Acute Goals and Plan of Care (Insert Text)  Discharge Goals:  (1.)Mr. Donna Solomon will move from supine to sit and sit to supine , scoot up and down and roll side to side with MODIFIED INDEPENDENCE within 3 day(s). (2.)Mr. Donna Solomon will transfer from bed to chair and chair to bed with MODIFIED INDEPENDENCE using the least restrictive device within 3 day(s). (3.)Mr. Caruso will ambulate with MODIFIED INDEPENDENCE for 250 feet with the least restrictive device within 3 day(s). (4.)Mr. Donna Solomon will verbalize spinal precautions with 0 verbal/manual cues within 3 day(s). (5.)Mr. Donna Solomon will participate in 10 minutes of LE exercise to address activity tolerance with MIN verbal/manual cues to maintain proper form within 3 day(s). ________________________________________________________________________________________________      PHYSICAL THERAPY: Treatment Day: 1st and AM 10/11/2017  INPATIENT: Hospital Day: 3  Payor: Daphne Sheth / Plan: [address] [place] [address] HMO / Product Type: Managed Care Medicare /      NAME/AGE/GENDER: Guille Yusuf is a 56 y.o. male     PRIMARY DIAGNOSIS: Lumbar stenosis [M48.061]  Spondylolisthesis of lumbar region [M43.16]  Weakness of both lower extremities [R29.898] Spinal stenosis of lumbar region Spinal stenosis of lumbar region  Procedure(s) (LRB):  L3-L5 LAMI FUSION SPINE  (TLIF) (N/A)  2 Days Post-Op  ICD-10: Treatment Diagnosis:       · Generalized Muscle Weakness (M62.81)  · Other lack of cordination (R27.8)  · Other abnormalities of gait and mobility (R26.89)   Precaution/Allergies:  Review of patient's allergies indicates no known allergies. ASSESSMENT:      Mr. Donna Solomon is a 56 y.o. male admitted for L3-L5 spinal fusion. Pt was supine on contact. Before he was instructed in log rolling he sat straight up in bed. He was instructed in the proper was to log roll to sit up.  He donned his brace independently and stood with SBA and VC's to push from the bed. He was able to ambulated 90' x 2 with standing rest break. Hyperextension of both knees during gt L > R. Returned to chair in room and performed bilateral LE ex as listed below. Transferred back to bed, brace removed and pt returned supine by log rolling with instruction and min assist. Left in supine with posey on, call light within reach, and RN notified. Increased gt distance. Abad Liu is currently functioning below his baseline and would benefit from skilled PT during acute care stay to maximize safety and independence with functional mobility. This section established at most recent assessment   PROBLEM LIST (Impairments causing functional limitations):  1. Decreased Strength  2. Decreased ADL/Functional Activities  3. Decreased Transfer Abilities  4. Decreased Ambulation Ability/Technique  5. Decreased Balance  6. Decreased Activity Tolerance  7. Increased Fatigue  8. Decreased Knowledge of Precautions  9. Decreased [place] with Home Exercise Program    INTERVENTIONS PLANNED: (Benefits and precautions of physical therapy have been discussed with the patient.)  1. Balance Exercise  2. Bed Mobility  3. Gait Training  4. Home Exercise Program (HEP)  5. Neuromuscular Re-education/Strengthening  6. Therapeutic Activites  7. Therapeutic Exercise/Strengthening  8. Transfer Training  9. Group Therapy      TREATMENT PLAN: Frequency/Duration: twice daily for duration of hospital stay  Rehabilitation Potential For Stated Goals: GOOD      RECOMMENDED REHABILITATION/EQUIPMENT: (at time of discharge pending progress): Due to the probability of continued deficits (see above) this patient will likely need continued skilled physical therapy after discharge. Equipment:   · Walkers, Type: Rolling Walker                   HISTORY:   History of Present Injury/Illness (Reason for Referral):   \"HISTORY OF PRESENT ILLNESS: He was last seen in the office   08/25/2017 for low back pain and bilateral leg pain and weakness   and nothing has changed in his review of systems or history   since that time, so for details on review of systems, refer to   that H and P. He is a 56-year-old gentleman on disability   because of back and leg pain. He has spinal stenosis. He did not   get better with conservative treatment. He also has a   spondylolisthesis at L4-L5. Because of his failure to get better,   he is being brought for surgical treatment. His medical problems   are high blood pressure, elevated cholesterol\"  Past Medical History/Comorbidities:   Mr. Dimitris Beverly  has a past medical history of Arthritis; Chronic back pain; Colon cancer ([facility] (1990); Depression (05/14/2015); Diabetes ([facility] Former cigarette smoker; HTN (hypertension) (04/28/2014); and Hypercholesteremia. He also has no past medical history of Coagulation defects; COPD; Other ill-defined conditions(799.89); or Unspecified adverse effect of anesthesia. Mr. Dimitris Beverly  has a past surgical history that includes small bowel resection (1990); lap cholecystectomy; colonoscopy; and wisdom teeth extraction. Social History/Living Environment:   Home Environment: Private residence  # Steps to Enter: 0  One/Two Story Residence: One story  Living Alone: Yes  Support Systems: Spouse/Significant Other/Partner  Patient Expects to be Discharged to[de-identified] Private residence (with girlfriend )  Current DME Used/Available at Home: Cane, straight  Tub or Shower Type: Tub/Shower combination  Prior Level of Function/Work/Activity:  Pt was independent with functional mobility, transfers, ambulation, and ADLs prior to admission. Number of Personal Factors/Comorbidities that affect the Plan of Care: 1-2: MODERATE COMPLEXITY   EXAMINATION:   Most Recent Physical Functioning:   Gross Assessment:                  Posture:  Posture (WDL): Exceptions to WDL  Posture Assessment:  Forward head, Rounded shoulders  Balance:  Sitting: Intact  Standing: Impaired  Standing - Static: Good  Standing - Dynamic : Fair Bed Mobility:  Supine to Sit: Other (comment) (Pt sat up in bed before instruction to roll to side)  Sit to Supine: Minimum assistance  Wheelchair Mobility:     Transfers:  Sit to Stand: Contact guard assistance  Stand to Sit: Contact guard assistance  Bed to Chair: Stand-by asssistance  Gait:     Base of Support: Narrowed  Speed/Mari: Shuffled; Slow  Step Length: Left shortened;Right shortened  Gait Abnormalities: Decreased step clearance  Distance (ft): 90 Feet (ft) (x 2)  Assistive Device: Walker, rolling  Ambulation - Level of Assistance: Contact guard assistance  Interventions: Safety awareness training;Verbal cues; Visual/Demos       Body Structures Involved:  1. Nerves  2. Bones  3. Joints  4. Muscles Body Functions Affected:  1. Sensory/Pain  2. Neuromusculoskeletal  3. Movement Related  4. Skin Related Activities and Participation Affected:  1. General Tasks and Demands  2. Mobility  3. Self Care  4. Domestic Life  5. Interpersonal Interactions and Relationships  6. Community, Social and [place]   Number of elements that affect the Plan of Care: 3: MODERATE COMPLEXITY   CLINICAL PRESENTATION:   Presentation: Stable and uncomplicated: LOW COMPLEXITY   CLINICAL DECISION MAKIN[address] [place] [address]-PAC 6 Clicks   Basic Mobility Inpatient Short Form  How much difficulty does the patient currently have. .. Unable A Lot A Little None   1. Turning over in bed (including adjusting bedclothes, sheets and blankets)? [ ] 1   [ ] 2   [X] 3   [ ] 4   2. Sitting down on and standing up from a chair with arms ( e.g., wheelchair, bedside commode, etc.)   [ ] 1   [ ] 2   [X] 3   [ ] 4   3. Moving from lying on back to sitting on the side of the bed? [ ] 1   [ ] 2   [X] 3   [ ] 4   How much help from another person does the patient currently need. .. Total A Lot A Little None   4. Moving to and from a bed to a chair (including a wheelchair)? [ ] 1   [ ] 2   [X] 3   [ ] 4   5. Need to walk in hospital room? [ ] 1   [ ] 2   [X] 3   [ ] 4   6. Climbing 3-5 steps with a railing? [ ] 1   [ ] 2   [X] 3   [ ] 4   © 2007, Trustees of [address] [place] [address] [address] [address], under license to [employer]. All rights reserved    Score:  Initial: 18 Most Recent: X (Date: -- )     Interpretation of Tool:  Represents activities that are increasingly more difficult (i.e. Bed mobility, Transfers, Gait). Score 24 23 22-20 19-15 14-10 9-7 6       Modifier CH CI CJ CK CL CM CN         · Mobility - Walking and Moving Around:               - CURRENT STATUS:    CK - 40%-59% impaired, limited or restricted               - GOAL STATUS:           CJ - 20%-39% impaired, limited or restricted               - D/C STATUS:                       ---------------To be determined---------------  Payor: [employer] MEDICARE / Plan: [address] [place] [address] HMO / Product Type: Managed Care Medicare /       Medical Necessity:     · Skilled intervention continues to be required due to decline in functional mobility, ambulation, and ability to complete ADLs independently and safely. .  Reason for Services/Other Comments:  · Patient continues to require skilled intervention due to medical complications and patient unable to attend/participate in therapy as expected. Use of outcome tool(s) and clinical judgement create a POC that gives a: Clear prediction of patient's progress: LOW COMPLEXITY                 TREATMENT:   (In addition to Assessment/Re-Assessment sessions the following treatments were rendered)   Pre-treatment Symptoms/Complaints:  \"this is the farthest I've walked in 3 months. \"  Pain: Initial:   Pain Intensity 1: 0  Post Session:  0      Therapeutic Activity: (    24 min): Therapeutic activities including Bed transfers, Chair transfers, Ambulation on level ground, LE ex and sit to stands to improve mobility, strength and balance. Required minimal Safety awareness training;Verbal cues; Visual/Demos to promote static and dynamic balance in standing. Date:  10/11/17 Date:   Date:     Activity/Exercise Seated Parameters Parameters Parameters   Heel raises X 20 B     Toe raises X 20 B     LAQ's 5 x 3 B     Hip Flex 5 x 3 B     Hip ABD 5 x 3 B           Supine heel slides X 5 B                       Braces/Orthotics/Lines/Etc:   · room air  Treatment/Session Assessment:    · Response to Treatment:  Pt tolerant to treatment, but is a little impulsive with mobility/ambulation. · Interdisciplinary Collaboration:  · Physical Therapist, Physical Therapy Assistant, Registered Nurse and Rehabilitation Attendant  · After treatment position/precautions:  · Supine in bed, Bed alarm/tab alert on, Bed/Chair-wheels locked, Bed in low position, Call light within reach and RN notified  · Compliance with Program/Exercises: Will assess as treatment progresses. · Recommendations/Intent for next treatment session: \"Next visit will focus on advancements to more challenging activities and reduction in assistance provided\".   Total Treatment Duration:PT Patient Time In/Time Out  Time In: 6901  Time Out: [address] [place] [address]

## 2017-10-11 NOTE — PROGRESS NOTES
Problem: Mobility Impaired (Adult and Pediatric)  Goal: *Acute Goals and Plan of Care (Insert Text)  Discharge Goals:  (1.)Mr. Ayleen Brar will move from supine to sit and sit to supine , scoot up and down and roll side to side with MODIFIED INDEPENDENCE within 3 day(s). (2.)Mr. Ayleen Brar will transfer from bed to chair and chair to bed with MODIFIED INDEPENDENCE using the least restrictive device within 3 day(s). (3.)Mr. Caruso will ambulate with MODIFIED INDEPENDENCE for 250 feet with the least restrictive device within 3 day(s). (4.)Mr. Ayleen Brar will verbalize spinal precautions with 0 verbal/manual cues within 3 day(s). (5.)Mr. Ayleen Brar will participate in 10 minutes of LE exercise to address activity tolerance with MIN verbal/manual cues to maintain proper form within 3 day(s). ________________________________________________________________________________________________      PHYSICAL THERAPY: Treatment Day: 1st and PM 10/11/2017  INPATIENT: Hospital Day: 3  Payor: [place] Santa / Plan: [address] [place] [address] HMO / Product Type: Managed Care Medicare /      NAME/AGE/GENDER: Leda Luna is a 56 y.o. male     PRIMARY DIAGNOSIS: Lumbar stenosis [M48.061]  Spondylolisthesis of lumbar region [M43.16]  Weakness of both lower extremities [R29.898] Spinal stenosis of lumbar region Spinal stenosis of lumbar region  Procedure(s) (LRB):  L3-L5 LAMI FUSION SPINE  (TLIF) (N/A)  2 Days Post-Op  ICD-10: Treatment Diagnosis:       · Generalized Muscle Weakness (M62.81)  · Other lack of cordination (R27.8)  · Other abnormalities of gait and mobility (R26.89)   Precaution/Allergies:  Review of patient's allergies indicates no known allergies. ASSESSMENT:      Mr. Ayleen Brar is a 56 y.o. male admitted for L3-L5 spinal fusion. Pt was supine on contact. He performed log rolling and sat to EOB with SBA. He donned his brace independently and stood with SBA and VC's to push from the bed. He was able to ambulated 500'.  Hyperextension of both knees during gt L > R. Returned to EOB, brace removed and pt returned supine by log rolling with instruction and min assist. Left in supine with posey on, call light within reach, and RN notified. Increased gt distance. Trey Archer is currently functioning below his baseline and would benefit from skilled PT during acute care stay to maximize safety and independence with functional mobility. This section established at most recent assessment   PROBLEM LIST (Impairments causing functional limitations):  1. Decreased Strength  2. Decreased ADL/Functional Activities  3. Decreased Transfer Abilities  4. Decreased Ambulation Ability/Technique  5. Decreased Balance  6. Decreased Activity Tolerance  7. Increased Fatigue  8. Decreased Knowledge of Precautions  9. Decreased [place] with Home Exercise Program    INTERVENTIONS PLANNED: (Benefits and precautions of physical therapy have been discussed with the patient.)  1. Balance Exercise  2. Bed Mobility  3. Gait Training  4. Home Exercise Program (HEP)  5. Neuromuscular Re-education/Strengthening  6. Therapeutic Activites  7. Therapeutic Exercise/Strengthening  8. Transfer Training  9. Group Therapy      TREATMENT PLAN: Frequency/Duration: twice daily for duration of hospital stay  Rehabilitation Potential For Stated Goals: GOOD      RECOMMENDED REHABILITATION/EQUIPMENT: (at time of discharge pending progress): Due to the probability of continued deficits (see above) this patient will likely need continued skilled physical therapy after discharge. Equipment:   · Walkers, Type: Rolling Walker                   HISTORY:   History of Present Injury/Illness (Reason for Referral): \"HISTORY OF PRESENT ILLNESS: He was last seen in the office   08/25/2017 for low back pain and bilateral leg pain and weakness   and nothing has changed in his review of systems or history   since that time, so for details on review of systems, refer to   that H and P.  He is a 72-year-old gentleman on disability   because of back and leg pain. He has spinal stenosis. He did not   get better with conservative treatment. He also has a   spondylolisthesis at L4-L5. Because of his failure to get better,   he is being brought for surgical treatment. His medical problems   are high blood pressure, elevated cholesterol\"  Past Medical History/Comorbidities:   Mr. Christiane Miller  has a past medical history of Arthritis; Chronic back pain; Colon cancer ([facility] [address] (1990); Depression (05/14/2015); Diabetes ([facility] [address] Former cigarette smoker; HTN (hypertension) (04/28/2014); and Hypercholesteremia. He also has no past medical history of Coagulation defects; COPD; Other ill-defined conditions(799.89); or Unspecified adverse effect of anesthesia. Mr. Christiane Miller  has a past surgical history that includes small bowel resection (1990); lap cholecystectomy; colonoscopy; and wisdom teeth extraction. Social History/Living Environment:   Home Environment: Private residence  # Steps to Enter: 0  One/Two Story Residence: One story  Living Alone: Yes  Support Systems: Spouse/Significant Other/Partner  Patient Expects to be Discharged to[de-identified] Private residence (with girlfriend )  Current DME Used/Available at Home: Cane, straight  Tub or Shower Type: Tub/Shower combination  Prior Level of Function/Work/Activity:  Pt was independent with functional mobility, transfers, ambulation, and ADLs prior to admission. Number of Personal Factors/Comorbidities that affect the Plan of Care: 1-2: MODERATE COMPLEXITY   EXAMINATION:   Most Recent Physical Functioning:   Gross Assessment:                  Posture:  Posture (WDL): Exceptions to WDL  Posture Assessment:  Forward head, Rounded shoulders  Balance:  Sitting: Intact  Standing: Impaired  Standing - Static: Good  Standing - Dynamic : Fair Bed Mobility:  Rolling: Contact guard assistance  Supine to Sit: Stand-by asssistance  Sit to Supine: Contact guard assistance  Wheelchair Mobility: Transfers:  Sit to Stand: Contact guard assistance  Stand to Sit: Contact guard assistance  Bed to Chair: Stand-by asssistance  Gait:     Base of Support: Narrowed  Speed/Mari: Shuffled; Slow  Step Length: Left shortened;Right shortened  Gait Abnormalities: Decreased step clearance  Distance (ft): 500 Feet (ft)  Assistive Device: Walker, rolling  Ambulation - Level of Assistance: Contact guard assistance  Interventions: Safety awareness training;Verbal cues; Visual/Demos       Body Structures Involved:  1. Nerves  2. Bones  3. Joints  4. Muscles Body Functions Affected:  1. Sensory/Pain  2. Neuromusculoskeletal  3. Movement Related  4. Skin Related Activities and Participation Affected:  1. General Tasks and Demands  2. Mobility  3. Self Care  4. Domestic Life  5. Interpersonal Interactions and Relationships  6. Community, Social and [place]   Number of elements that affect the Plan of Care: 3: MODERATE COMPLEXITY   CLINICAL PRESENTATION:   Presentation: Stable and uncomplicated: LOW COMPLEXITY   CLINICAL DECISION MAKIN[address] [place] [id number] AM-PAC 6 Clicks   Basic Mobility Inpatient Short Form  How much difficulty does the patient currently have. .. Unable A Lot A Little None   1. Turning over in bed (including adjusting bedclothes, sheets and blankets)? [ ] 1   [ ] 2   [X] 3   [ ] 4   2. Sitting down on and standing up from a chair with arms ( e.g., wheelchair, bedside commode, etc.)   [ ] 1   [ ] 2   [X] 3   [ ] 4   3. Moving from lying on back to sitting on the side of the bed? [ ] 1   [ ] 2   [X] 3   [ ] 4   How much help from another person does the patient currently need. .. Total A Lot A Little None   4. Moving to and from a bed to a chair (including a wheelchair)? [ ] 1   [ ] 2   [X] 3   [ ] 4   5. Need to walk in hospital room? [ ] 1   [ ] 2   [X] 3   [ ] 4   6. Climbing 3-5 steps with a railing?    [ ] 1   [ ] 2   [X] 3   [ ] 4   © , Trustees of [address] [place] [id number], under license to [employer]. All rights reserved    Score:  Initial: 18 Most Recent: X (Date: -- )     Interpretation of Tool:  Represents activities that are increasingly more difficult (i.e. Bed mobility, Transfers, Gait). Score 24 23 22-20 19-15 14-10 9-7 6       Modifier CH CI CJ CK CL CM CN         · Mobility - Walking and Moving Around:               - CURRENT STATUS:    CK - 40%-59% impaired, limited or restricted               - GOAL STATUS:           CJ - 20%-39% impaired, limited or restricted               - D/C STATUS:                       ---------------To be determined---------------  Payor: [employer] MEDICARE / Plan: [address] [place] [address] HMO / Product Type: [employer] Medicare /       Medical Necessity:     · Skilled intervention continues to be required due to decline in functional mobility, ambulation, and ability to complete ADLs independently and safely. .  Reason for Services/Other Comments:  · Patient continues to require skilled intervention due to medical complications and patient unable to attend/participate in therapy as expected. Use of outcome tool(s) and clinical judgement create a POC that gives a: Clear prediction of patient's progress: LOW COMPLEXITY                 TREATMENT:   (In addition to Assessment/Re-Assessment sessions the following treatments were rendered)   Pre-treatment Symptoms/Complaints:  \"this is the farthest I've walked in 3 months. \"  Pain: Initial:   Pain Intensity 1: 0  Post Session:  0      Therapeutic Activity: (    23 min): Therapeutic activities including Bed transfers,, Ambulation on level ground, and sit to stands to improve mobility, strength and balance. Required minimal Safety awareness training;Verbal cues; Visual/Demos to promote static and dynamic balance in standing.       Date:  10/11/17 AM Date:   Date:     Activity/Exercise Seated Parameters Parameters Parameters   Heel raises X 20 B     Toe raises X 20 B     LAQ's 5 x 3 B     Hip Flex 5 x 3 B     Hip ABD 5 x 3 B           Supine heel slides X 5 B                       Braces/Orthotics/Lines/Etc:   · room air  Treatment/Session Assessment:    · Response to Treatment:  Progressing well. · Interdisciplinary Collaboration:  · Physical Therapy Assistant, Registered Nurse and Rehabilitation Attendant  · After treatment position/precautions:  · Supine in bed, Bed alarm/tab alert on, Bed/Chair-wheels locked, Bed in low position, Call light within reach and RN notified  · Compliance with Program/Exercises: Will assess as treatment progresses. · Recommendations/Intent for next treatment session: \"Next visit will focus on advancements to more challenging activities and reduction in assistance provided\".   Total Treatment Duration:PT Patient Time In/Time Out  Time In: 5651  Time Out: [facility]

## 2017-10-11 NOTE — PROGRESS NOTES
POD 2    Drain - 170cc    AF,VSS  Pt reports excellent relief of preop leg pain  Dressing okay  Neuro intact  Stable, continue PT, home tomorrow

## 2017-10-12 VITALS
WEIGHT: 219.19 LBS | SYSTOLIC BLOOD PRESSURE: 120 MMHG | BODY MASS INDEX: 26.69 KG/M2 | OXYGEN SATURATION: 97 % | DIASTOLIC BLOOD PRESSURE: 79 MMHG | TEMPERATURE: 97.8 F | HEIGHT: 76 IN | HEART RATE: 88 BPM | RESPIRATION RATE: 18 BRPM

## 2017-10-12 PROCEDURE — 97530 THERAPEUTIC ACTIVITIES: CPT

## 2017-10-12 PROCEDURE — 90471 IMMUNIZATION ADMIN: CPT

## 2017-10-12 PROCEDURE — 74011250636 HC RX REV CODE- 250/636: Performed by: ORTHOPAEDIC SURGERY

## 2017-10-12 PROCEDURE — 90686 IIV4 VACC NO PRSV 0.5 ML IM: CPT | Performed by: ORTHOPAEDIC SURGERY

## 2017-10-12 PROCEDURE — 74011250637 HC RX REV CODE- 250/637: Performed by: ORTHOPAEDIC SURGERY

## 2017-10-12 RX ADMIN — OXYCODONE HYDROCHLORIDE AND ACETAMINOPHEN 1 TABLET: 10; 325 TABLET ORAL at 05:18

## 2017-10-12 RX ADMIN — MAGNESIUM HYDROXIDE 30 ML: 400 SUSPENSION ORAL at 08:13

## 2017-10-12 RX ADMIN — INFLUENZA VIRUS VACCINE 0.5 ML: 15; 15; 15; 15 SUSPENSION INTRAMUSCULAR at 10:14

## 2017-10-12 RX ADMIN — Medication 10 ML: at 05:18

## 2017-10-12 RX ADMIN — FLUOXETINE 20 MG: 20 CAPSULE ORAL at 08:13

## 2017-10-12 RX ADMIN — AMLODIPINE BESYLATE 10 MG: 10 TABLET ORAL at 08:13

## 2017-10-12 RX ADMIN — FAMOTIDINE 20 MG: 20 TABLET, FILM COATED ORAL at 08:13

## 2017-10-12 RX ADMIN — METFORMIN HYDROCHLORIDE 500 MG: 500 TABLET, FILM COATED ORAL at 08:13

## 2017-10-12 RX ADMIN — ATORVASTATIN CALCIUM 20 MG: 10 TABLET, FILM COATED ORAL at 08:13

## 2017-10-12 NOTE — PROGRESS NOTES
Problem: Falls - Risk of  Goal: *Absence of Falls  Document Eliot Fall Risk and appropriate interventions in the flowsheet.    Outcome: Progressing Towards Goal  Fall Risk Interventions:  Mobility Interventions: Bed/chair exit alarm, Patient to call before getting OOB, Communicate number of staff needed for ambulation/transfer           Medication Interventions: Bed/chair exit alarm, Patient to call before getting OOB, Assess postural VS orthostatic hypotension     Elimination Interventions: Bed/chair exit alarm, Call light in reach, Patient to call for help with toileting needs

## 2017-10-12 NOTE — PROGRESS NOTES
Problem: Pain  Goal: *Control of Pain  Outcome: Progressing Towards Goal  Use of pain meds and assessment to maintain a tolerable level.

## 2017-10-12 NOTE — DISCHARGE INSTRUCTIONS
MAY shower (may shower with dressing on)-->NO tub baths    LEAVE dressing on incision for 3 DAYS-->then may remove   (If dressing starts to fall off may re-secure with tape)    NO lifting anything heavier than 5LBS     NO Bending, Lifting or Twisting    WEAR your brace if prescribed at all times EXCEPT when in bed, just going to the   bathroom, or showering    Avoid sitting more than 20 - 30 minutes at at time    NO driving until directed by your doctor    DO NOT take any NSAIDS (either prescribed or over the counter until directed    (Aleve, Ibuprofen, Mobic, etc) as this will interfere with bone healing    CALL the doctor if:  Fever >100.5  ([phone])                 Incision becomes red, swollen or opens up               Incision has yellow, thick drainage or an odor               Pain is not managed with prescribed medications               Excessive nausea and/or vomiting    Avoid having pets sleep in bed with you until incision is completely healed    DISCHARGE SUMMARY from Nurse    The following personal items are in your possession at time of discharge:    Dental Appliances: None           Jewelry: None  Clothing: Shirt, Pants, Footwear, Undergarments  Other Valuables: None             PATIENT INSTRUCTIONS:    After general anesthesia or intravenous sedation, for 24 hours or while taking prescription Narcotics:  · Limit your activities  · Do not drive and operate hazardous machinery  · Do not make important personal or business decisions  · Do  not drink alcoholic beverages  · If you have not urinated within 8 hours after discharge, please contact your surgeon on call.     Report the following to your surgeon:  · Excessive pain, swelling, redness or odor of or around the surgical area  · Temperature over 100.5  · Nausea and vomiting lasting longer than 4 hours or if unable to take medications  · Any signs of decreased circulation or nerve impairment to extremity: change in color, persistent  numbness, tingling, coldness or increase pain  · Any questions        What to do at Home:  Recommended activity: See surgical instructions, diet as tolerated. *  Please give a list of your current medications to your Primary Care Provider. *  Please update this list whenever your medications are discontinued, doses are      changed, or new medications (including over-the-counter products) are added. *  Please carry medication information at all times in case of emergency situations. These are general instructions for a healthy lifestyle:    No smoking/ No tobacco products/ Avoid exposure to second hand smoke    Surgeon General's Warning:  Quitting smoking now greatly reduces serious risk to your health. Obesity, smoking, and sedentary lifestyle greatly increases your risk for illness    A healthy diet, regular physical exercise & weight monitoring are important for maintaining a healthy lifestyle    You may be retaining fluid if you have a history of heart failure or if you experience any of the following symptoms:  Weight gain of 3 pounds or more overnight or 5 pounds in a week, increased swelling in our hands or feet or shortness of breath while lying flat in bed. Please call your doctor as soon as you notice any of these symptoms; do not wait until your next office visit. Recognize signs and symptoms of STROKE:    F-face looks uneven    A-arms unable to move or move unevenly    S-speech slurred or non-existent    T-time-call 911 as soon as signs and symptoms begin-DO NOT go       Back to bed or wait to see if you get better-TIME IS BRAIN. Warning Signs of HEART ATTACK     Call 911 if you have these symptoms:   Chest discomfort. Most heart attacks involve discomfort in the center of the chest that lasts more than a few minutes, or that goes away and comes back. It can feel like uncomfortable pressure, squeezing, fullness, or pain.  Discomfort in other areas of the upper body.  Symptoms can include pain or discomfort in one or both arms, the back, neck, jaw, or stomach.  Shortness of breath with or without chest discomfort.  Other signs may include breaking out in a cold sweat, nausea, or lightheadedness. Don't wait more than five minutes to call 911 - MINUTES MATTER! Fast action can save your life. Calling 911 is almost always the fastest way to get lifesaving treatment. Emergency Medical Services staff can begin treatment when they arrive -- up to an hour sooner than if someone gets to the hospital by car. The discharge information has been reviewed with the patient. The patient verbalized understanding. Discharge medications reviewed with the patient and appropriate educational materials and side effects teaching were provided.

## 2017-10-12 NOTE — PROGRESS NOTES
Problem: Mobility Impaired (Adult and Pediatric)  Goal: *Acute Goals and Plan of Care (Insert Text)  Discharge Goals:  (1.)Mr. Felipa Ralph will move from supine to sit and sit to supine , scoot up and down and roll side to side with MODIFIED INDEPENDENCE within 3 day(s). (2.)Mr. Felipa Ralph will transfer from bed to chair and chair to bed with MODIFIED INDEPENDENCE using the least restrictive device within 3 day(s). (3.)Mr. Caruso will ambulate with MODIFIED INDEPENDENCE for 250 feet with the least restrictive device within 3 day(s). (4.)Mr. Felipa Ralph will verbalize spinal precautions with 0 verbal/manual cues within 3 day(s). (Met 10/12/17)  (5.)Mr. Felipa Ralph will participate in 10 minutes of LE exercise to address activity tolerance with MIN verbal/manual cues to maintain proper form within 3 day(s). ________________________________________________________________________________________________      PHYSICAL THERAPY: Daily Note, Treatment Day: 2nd and AM 10/12/2017  INPATIENT: Hospital Day: 4  Payor: Kenji Nath / Plan: [address] [place] [address] HMO / Product Type: [employer] Care Medicare /      NAME/AGE/GENDER: Malinda Faulkner is a 56 y.o. male     PRIMARY DIAGNOSIS: Lumbar stenosis [M48.061]  Spondylolisthesis of lumbar region [M43.16]  Weakness of both lower extremities [R29.898] Spinal stenosis of lumbar region Spinal stenosis of lumbar region  Procedure(s) (LRB):  L3-L5 LAMI FUSION SPINE  (TLIF) (N/A)  3 Days Post-Op  ICD-10: Treatment Diagnosis:       · Generalized Muscle Weakness (M62.81)  · Other lack of cordination (R27.8)  · Other abnormalities of gait and mobility (R26.89)   Precaution/Allergies:  Review of patient's allergies indicates no known allergies. ASSESSMENT:      Mr. Felipa Ralph is a 56 y.o. male admitted for L3-L5 spinal fusion. Pt was supine on contact. He performed log rolling and sat to EOB with supervision. He recalled 3/3 spinal precautions. He donned his brace independently and stood with supervision. He was able to ambulated 500' with RW and supervision with occasional VC's to stand erect. Hyperextension of both knees improving. Returned to EOB then asked to go to the BR for bm. Pt sat on toilet and was instructed to pull call bell when he was ready to get up. González Walsh is currently functioning below his baseline and would benefit from skilled PT during acute care stay to maximize safety and independence with functional mobility. This section established at most recent assessment   PROBLEM LIST (Impairments causing functional limitations):  1. Decreased Strength  2. Decreased ADL/Functional Activities  3. Decreased Transfer Abilities  4. Decreased Ambulation Ability/Technique  5. Decreased Balance  6. Decreased Activity Tolerance  7. Increased Fatigue  8. Decreased Knowledge of Precautions  9. Decreased [place] with Home Exercise Program    INTERVENTIONS PLANNED: (Benefits and precautions of physical therapy have been discussed with the patient.)  1. Balance Exercise  2. Bed Mobility  3. Gait Training  4. Home Exercise Program (HEP)  5. Neuromuscular Re-education/Strengthening  6. Therapeutic Activites  7. Therapeutic Exercise/Strengthening  8. Transfer Training  9. Group Therapy      TREATMENT PLAN: Frequency/Duration: twice daily for duration of hospital stay  Rehabilitation Potential For Stated Goals: GOOD      RECOMMENDED REHABILITATION/EQUIPMENT: (at time of discharge pending progress): Due to the probability of continued deficits (see above) this patient will likely need continued skilled physical therapy after discharge. Equipment:   · Walkers, Type: Rolling Walker                   HISTORY:   History of Present Injury/Illness (Reason for Referral):   \"HISTORY OF PRESENT ILLNESS: He was last seen in the office   08/25/2017 for low back pain and bilateral leg pain and weakness   and nothing has changed in his review of systems or history   since that time, so for details on review of systems, refer to   that H and P. He is a 70-year-old gentleman on disability   because of back and leg pain. He has spinal stenosis. He did not   get better with conservative treatment. He also has a   spondylolisthesis at L4-L5. Because of his failure to get better,   he is being brought for surgical treatment. His medical problems   are high blood pressure, elevated cholesterol\"  Past Medical History/Comorbidities:   Mr. Mitch Taylor  has a past medical history of Arthritis; Chronic back pain; Colon cancer ([facility] (1990); Depression (05/14/2015); [facility] Former cigarette smoker; HTN (hypertension) (04/28/2014); and Hypercholesteremia. He also has no past medical history of Coagulation defects; COPD; Other ill-defined conditions(799.89); or Unspecified adverse effect of anesthesia. Mr. Mitch Taylor  has a past surgical history that includes small bowel resection (1990); lap cholecystectomy; colonoscopy; and wisdom teeth extraction. Social History/Living Environment:   Home Environment: Private residence  # Steps to Enter: 0  One/Two Story Residence: One story  Living Alone: Yes  Support Systems: Spouse/Significant Other/Partner  Patient Expects to be Discharged to[de-identified] Private residence (with girlfriend )  Current DME Used/Available at Home: Cane, straight  Tub or Shower Type: Tub/Shower combination  Prior Level of Function/Work/Activity:  Pt was independent with functional mobility, transfers, ambulation, and ADLs prior to admission. Number of Personal Factors/Comorbidities that affect the Plan of Care: 1-2: MODERATE COMPLEXITY   EXAMINATION:   Most Recent Physical Functioning:   Gross Assessment:                  Posture:  Posture (WDL): Exceptions to WDL  Posture Assessment:  Forward head, Rounded shoulders  Balance:  Sitting: Intact  Standing: Impaired  Standing - Static: Good  Standing - Dynamic : Fair (+) Bed Mobility:  Supine to Sit: Supervision  Sit to Supine: Stand-by asssistance  Wheelchair Mobility: Transfers:  Sit to Stand: Contact guard assistance  Stand to Sit: Contact guard assistance  Gait:     Base of Support: Narrowed  Speed/Mari: Shuffled; Slow  Step Length: Left shortened;Right shortened  Gait Abnormalities: Decreased step clearance  Distance (ft): 500 Feet (ft)  Assistive Device: Walker, rolling  Ambulation - Level of Assistance: Stand-by asssistance       Body Structures Involved:  1. Nerves  2. Bones  3. Joints  4. Muscles Body Functions Affected:  1. Sensory/Pain  2. Neuromusculoskeletal  3. Movement Related  4. Skin Related Activities and Participation Affected:  1. General Tasks and Demands  2. Mobility  3. Self Care  4. Domestic Life  5. Interpersonal Interactions and Relationships  6. Community, Social and [place]   Number of elements that affect the Plan of Care: 3: MODERATE COMPLEXITY   CLINICAL PRESENTATION:   Presentation: Stable and uncomplicated: LOW COMPLEXITY   CLINICAL DECISION MAKIN[address] [place] [address] AM-PAC 6 Clicks   Basic Mobility Inpatient Short Form  How much difficulty does the patient currently have. .. Unable A Lot A Little None   1. Turning over in bed (including adjusting bedclothes, sheets and blankets)? [ ] 1   [ ] 2   [X] 3   [ ] 4   2. Sitting down on and standing up from a chair with arms ( e.g., wheelchair, bedside commode, etc.)   [ ] 1   [ ] 2   [X] 3   [ ] 4   3. Moving from lying on back to sitting on the side of the bed? [ ] 1   [ ] 2   [X] 3   [ ] 4   How much help from another person does the patient currently need. .. Total A Lot A Little None   4. Moving to and from a bed to a chair (including a wheelchair)? [ ] 1   [ ] 2   [X] 3   [ ] 4   5. Need to walk in hospital room? [ ] 1   [ ] 2   [X] 3   [ ] 4   6. Climbing 3-5 steps with a railing? [ ] 1   [ ] 2   [X] 3   [ ] 4   © , Trustees of [address] [place] [address], under license to [employer].  All rights reserved    Score:  Initial: 18 Most Recent: X (Date: -- )     Interpretation of Tool:  Represents activities that are increasingly more difficult (i.e. Bed mobility, Transfers, Gait). Score 24 23 22-20 19-15 14-10 9-7 6       Modifier CH CI CJ CK CL CM CN         · Mobility - Walking and Moving Around:               - CURRENT STATUS:    CK - 40%-59% impaired, limited or restricted               - GOAL STATUS:           CJ - 20%-39% impaired, limited or restricted               - D/C STATUS:                       ---------------To be determined---------------  Payor: [employer] MEDICARE / Plan: [address] [place] [address] HMO / Product Type: Managed Care Medicare /       Medical Necessity:     · Skilled intervention continues to be required due to decline in functional mobility, ambulation, and ability to complete ADLs independently and safely. .  Reason for Services/Other Comments:  · Patient continues to require skilled intervention due to medical complications and patient unable to attend/participate in therapy as expected. Use of outcome tool(s) and clinical judgement create a POC that gives a: Clear prediction of patient's progress: LOW COMPLEXITY                 TREATMENT:   (In addition to Assessment/Re-Assessment sessions the following treatments were rendered)   Pre-treatment Symptoms/Complaints:  \"I haven't walked this far in a long time. \"  Pain: Initial:   Pain Intensity 1: 0  Post Session:  0      Therapeutic Activity: (    15 min): Therapeutic activities including Bed transfers, Ambulation on level ground, and sit to stands to improve mobility, strength and balance. Required minimal   to promote static and dynamic balance in standing.       Date:  10/11/17 AM Date:   Date:     Activity/Exercise Seated Parameters Parameters Parameters   Heel raises X 20 B     Toe raises X 20 B     LAQ's 5 x 3 B     Hip Flex 5 x 3 B     Hip ABD 5 x 3 B           Supine heel slides X 5 B                       Braces/Orthotics/Lines/Etc:   · room air  Treatment/Session Assessment: · Response to Treatment:  Progressing well. · Interdisciplinary Collaboration:  · Physical Therapy Assistant, Registered Nurse and Rehabilitation Attendant  · After treatment position/precautions:  · Supine in bed, Bed alarm/tab alert on, Bed/Chair-wheels locked, Bed in low position, Call light within reach and RN notified  · Compliance with Program/Exercises: Will assess as treatment progresses. · Recommendations/Intent for next treatment session: \"Next visit will focus on advancements to more challenging activities and reduction in assistance provided\".   Total Treatment Duration:PT Patient Time In/Time Out  Time In: 0625  Time Out: [address] [place], [place]

## 2017-10-12 NOTE — PROGRESS NOTES
Patient did not want to wait for Dr. Emilee Marti to come and sign his scripts. Stated that he would go by the office tomorrow. IVs removed. Patient discharged via wheelchair at 1236.

## 2017-10-12 NOTE — PROGRESS NOTES
Care Management Interventions  Mode of Transport at Discharge: Other (see comment)  Transition of Care Consult (CM Consult):  (co CM consult received)  Discharge Durable Medical Equipment: Yes (fixed wheel rolling walker from [place] [id number])  Physical Therapy Consult: Yes  Current Support Network: Lives with Spouse, Own Home  Confirm Follow Up Transport: Family  Discharge Location  Discharge Placement: Home    Pt is a 57 yo male admitted for a L3-L5 LAMI FUSION SPINE (TLIF) due to lumbar stenosis. Pt is medically cleared for discharge to home today. SW had arranged for the delivery of a rolling walker to pt's room. It has been delivered. No other discharge needs or concerns identified or reported. SW remains available to assist as needed.

## 2017-10-12 NOTE — PROGRESS NOTES
ORTH POST OP PROGRESS NOTE    2017  Admit Date: 10/9/2017  Admit Diagnosis: Lumbar stenosis [M48.061]  Spondylolisthesis of lumbar region [M43.16]  Weakness of both lower extremities [R29.898]  Procedure: Procedure(s):  L3-L5 LAMI FUSION SPINE  (TLIF)  Post Op day: 3 Days Post-Op      Subjective:     Malinda Faulkner is a patient who has no complaints. Walking well. Legs feel great    Objective:     Vital Signs:    Blood pressure 129/84, pulse 83, temperature 98.1 °F (36.7 °C), resp. rate 18, height 6' 4\" (1.93 m), weight 99.4 kg (219 lb 3 oz), SpO2 97 %. Temp (24hrs), Av.8 °F (37.1 °C), Min:98.1 °F (36.7 °C), Max:99.4 °F (37.4 °C)         10/10 1901 - 10/12 0700  In: -   Out: 5323 [HVBQB:6930; Drains:495]    LAB:    Recent Labs      10/10/17   0608   HGB  13.4*   WBC  8.8   PLT  188       Physical Exam    General:   Alert and oriented. No acute distress  Lungs:  Respirations unlabored. Extremities: No evidence of cyanosis. Calves soft, nontender. Moves both upper and lower extremities.    Dressing:  clean, dry, and intact  Neuro:  no deficit      Assessment:      Patient Active Problem List   Diagnosis Code    Essential hypertension I10    HLD (hyperlipidemia) E78.5    Hyperglycemia R73.9    Vitamin D deficiency E55.9    Depression F32.9    Chronic back pain M54.9, G89.29    Spinal stenosis of lumbar region M48.061    Spondylolisthesis of lumbar region M43.16    Spinal stenosis M48.00       Plan:     Continue PT  Discontinue: drain    Consult: none    Anticipate Discharge To: HOME today       Signed By: Thomas Martinez PA-C

## 2017-10-12 NOTE — PROGRESS NOTES
Discharge instructions and prescriptions given and explained to pt. Pt verbalized understanding. Medication side effects sheet reviewed with pt. Pt to be discharged home, after Dr. Kimber Torres bring prescription to the floor.

## 2017-10-12 NOTE — DISCHARGE SUMMARY
Discharge Summary    Leonidas Simpson  [id number]  1960  56 y.o. Admit date: 10/9/2017    Discharge date:10/12/2017    Admitting Physician: Keiry Chavez MD  Discharge Physician: Keiry Chavez MD    Admission Diagnoses: spinal stenosis, spondylolisthesis    Discharge Diagnoses: Acute and Chronic problems addressed during this hospital admission  Principal Problem:    Spinal stenosis of lumbar region (10/9/2017)    Active Problems:    Spondylolisthesis of lumbar region (10/9/2017)      Spinal stenosis (10/9/2017)        Surgeon: No att. providers found    Procedure: Procedure(s):  L3-L5 LAMI FUSION SPINE  (TLIF)    INDICATIONS FOR ADMISSION/PROCEDURE:  The patient has had low back pain with radiation to the buttocks and lower extremities for an extended period of time. The symptoms and exam findings were felt to be consistent with neurogenic claudication. The preoperative radiographs and MRI/CT showed spondylolisthesis and spinal stenosis. Conservative measures have been exhausted. The symptoms progressed to the point where there is difficulty performing any task that requires prolonged standing or walking. In the outpatient setting the risks, benefits and potential complications of the above-listed procedure were discussed with her and an informed consent was obtained. HBG at Discharge:   Recent Labs      10/10/17   0608   HGB  13.4[facility] Course: Patient admitted to ortho floor. Antibiotics were given postop. SCD and wilmra hose were in place for DVT prophylaxis. Maria catheter was in place until POD#1 then discontinued. Patient did not receive blood transfusion. The patient tolerated pain medications and po diet. The hemovac drain output gradually diminished and was then removed. Dressing remained clean, dry, and intact. Physical Therapy started on the day following surgery and progressed to ambulation without assistance.   At the time of discharge, the patient had understanding of precautions needed following surgery. Postoperative complications requiring extended length of stay: None    Discharged to: Home    New Medications:     Discharge instructions:  -Resume pre hospital diet            -Resume home medications per medical continuation form     -Follow up in office as scheduled   -Call doctor immediately if T>100.5, increased pain, swelling, drainage.   -If shortness of breath or chest pain, immediately go to ER  -Post surgical instruction sheet given to patient    Signed:  GAUDENCIO Caldwell  10/12/2017

## 2021-05-20 ENCOUNTER — APPOINTMENT (OUTPATIENT)
Dept: GENERAL RADIOLOGY | Age: 61
DRG: 287 | End: 2021-05-20
Attending: STUDENT IN AN ORGANIZED HEALTH CARE EDUCATION/TRAINING PROGRAM
Payer: MEDICARE

## 2021-05-20 ENCOUNTER — HOSPITAL ENCOUNTER (INPATIENT)
Age: 61
LOS: 2 days | Discharge: HOME | DRG: 287 | End: 2021-05-22
Attending: STUDENT IN AN ORGANIZED HEALTH CARE EDUCATION/TRAINING PROGRAM | Admitting: INTERNAL MEDICINE
Payer: MEDICARE

## 2021-05-20 ENCOUNTER — APPOINTMENT (OUTPATIENT)
Dept: CT IMAGING | Age: 61
DRG: 287 | End: 2021-05-20
Attending: STUDENT IN AN ORGANIZED HEALTH CARE EDUCATION/TRAINING PROGRAM
Payer: MEDICARE

## 2021-05-20 DIAGNOSIS — E87.70 HYPERVOLEMIA, UNSPECIFIED HYPERVOLEMIA TYPE: ICD-10-CM

## 2021-05-20 DIAGNOSIS — I48.11 LONGSTANDING PERSISTENT ATRIAL FIBRILLATION (HCC): Primary | ICD-10-CM

## 2021-05-20 PROBLEM — I16.0 HYPERTENSIVE URGENCY: Status: ACTIVE | Noted: 2021-05-20

## 2021-05-20 PROBLEM — E11.9 DIABETES MELLITUS TYPE 2, CONTROLLED (HCC): Status: ACTIVE | Noted: 2021-05-20

## 2021-05-20 PROBLEM — I48.91 ATRIAL FIBRILLATION (HCC): Status: ACTIVE | Noted: 2021-05-20

## 2021-05-20 LAB
25(OH)D3 SERPL-MCNC: 25.8 NG/ML (ref 30–100)
ALBUMIN SERPL-MCNC: 3.8 G/DL (ref 3.5–5)
ALBUMIN/GLOB SERPL: 1.1 {RATIO} (ref 1.2–3.5)
ALP SERPL-CCNC: 88 U/L (ref 50–136)
ALT SERPL-CCNC: 41 U/L (ref 12–65)
ANION GAP SERPL CALC-SCNC: 6 MMOL/L (ref 7–16)
AST SERPL-CCNC: 27 U/L (ref 15–37)
ATRIAL RATE: 375 BPM
BASOPHILS # BLD: 0.1 K/UL (ref 0–0.2)
BASOPHILS NFR BLD: 1 % (ref 0–2)
BILIRUB SERPL-MCNC: 1 MG/DL (ref 0.2–1.1)
BNP SERPL-MCNC: 6326 PG/ML (ref 5–125)
BUN SERPL-MCNC: 19 MG/DL (ref 6–23)
CALCIUM SERPL-MCNC: 8.9 MG/DL (ref 8.3–10.4)
CALCULATED R AXIS, ECG10: 89 DEGREES
CALCULATED T AXIS, ECG11: 42 DEGREES
CHLORIDE SERPL-SCNC: 107 MMOL/L (ref 98–107)
CO2 SERPL-SCNC: 26 MMOL/L (ref 21–32)
CREAT SERPL-MCNC: 1.17 MG/DL (ref 0.8–1.5)
D DIMER PPP FEU-MCNC: 1.29 UG/ML(FEU)
DIAGNOSIS, 93000: NORMAL
DIFFERENTIAL METHOD BLD: NORMAL
EOSINOPHIL # BLD: 0.2 K/UL (ref 0–0.8)
EOSINOPHIL NFR BLD: 2 % (ref 0.5–7.8)
ERYTHROCYTE [DISTWIDTH] IN BLOOD BY AUTOMATED COUNT: 14.6 % (ref 11.9–14.6)
GLOBULIN SER CALC-MCNC: 3.5 G/DL (ref 2.3–3.5)
GLUCOSE BLD STRIP.AUTO-MCNC: 111 MG/DL (ref 65–100)
GLUCOSE BLD STRIP.AUTO-MCNC: 113 MG/DL (ref 65–100)
GLUCOSE BLD STRIP.AUTO-MCNC: 142 MG/DL (ref 65–100)
GLUCOSE BLD STRIP.AUTO-MCNC: 160 MG/DL (ref 65–100)
GLUCOSE SERPL-MCNC: 112 MG/DL (ref 65–100)
HCT VFR BLD AUTO: 45.2 % (ref 41.1–50.3)
HGB BLD-MCNC: 14.6 G/DL (ref 13.6–17.2)
IMM GRANULOCYTES # BLD AUTO: 0 K/UL (ref 0–0.5)
IMM GRANULOCYTES NFR BLD AUTO: 0 % (ref 0–5)
LIPASE SERPL-CCNC: 114 U/L (ref 73–393)
LYMPHOCYTES # BLD: 2.5 K/UL (ref 0.5–4.6)
LYMPHOCYTES NFR BLD: 30 % (ref 13–44)
MAGNESIUM SERPL-MCNC: 1.9 MG/DL (ref 1.8–2.4)
MCH RBC QN AUTO: 28.2 PG (ref 26.1–32.9)
MCHC RBC AUTO-ENTMCNC: 32.3 G/DL (ref 31.4–35)
MCV RBC AUTO: 87.4 FL (ref 79.6–97.8)
MONOCYTES # BLD: 0.6 K/UL (ref 0.1–1.3)
MONOCYTES NFR BLD: 7 % (ref 4–12)
NEUTS SEG # BLD: 4.9 K/UL (ref 1.7–8.2)
NEUTS SEG NFR BLD: 60 % (ref 43–78)
NRBC # BLD: 0 K/UL (ref 0–0.2)
PLATELET # BLD AUTO: 208 K/UL (ref 150–450)
PMV BLD AUTO: 10.7 FL (ref 9.4–12.3)
POTASSIUM SERPL-SCNC: 4.2 MMOL/L (ref 3.5–5.1)
PROT SERPL-MCNC: 7.3 G/DL (ref 6.3–8.2)
Q-T INTERVAL, ECG07: 358 MS
QRS DURATION, ECG06: 78 MS
QTC CALCULATION (BEZET), ECG08: 437 MS
RBC # BLD AUTO: 5.17 M/UL (ref 4.23–5.6)
SERVICE CMNT-IMP: ABNORMAL
SODIUM SERPL-SCNC: 139 MMOL/L (ref 136–145)
T4 FREE SERPL-MCNC: 1.2 NG/DL (ref 0.78–1.46)
TROPONIN-HIGH SENSITIVITY: 103.9 PG/ML (ref 0–14)
TROPONIN-HIGH SENSITIVITY: 87.7 PG/ML (ref 0–14)
TSH SERPL DL<=0.005 MIU/L-ACNC: 2.35 UIU/ML (ref 0.36–3.74)
VENTRICULAR RATE, ECG03: 90 BPM
WBC # BLD AUTO: 8.3 K/UL (ref 4.3–11.1)

## 2021-05-20 PROCEDURE — 74011000250 HC RX REV CODE- 250: Performed by: INTERNAL MEDICINE

## 2021-05-20 PROCEDURE — 83735 ASSAY OF MAGNESIUM: CPT

## 2021-05-20 PROCEDURE — 71045 X-RAY EXAM CHEST 1 VIEW: CPT

## 2021-05-20 PROCEDURE — 65660000000 HC RM CCU STEPDOWN

## 2021-05-20 PROCEDURE — 85379 FIBRIN DEGRADATION QUANT: CPT

## 2021-05-20 PROCEDURE — 83880 ASSAY OF NATRIURETIC PEPTIDE: CPT

## 2021-05-20 PROCEDURE — 74011250636 HC RX REV CODE- 250/636: Performed by: NURSE PRACTITIONER

## 2021-05-20 PROCEDURE — 99285 EMERGENCY DEPT VISIT HI MDM: CPT

## 2021-05-20 PROCEDURE — 85025 COMPLETE CBC W/AUTO DIFF WBC: CPT

## 2021-05-20 PROCEDURE — 93005 ELECTROCARDIOGRAM TRACING: CPT | Performed by: STUDENT IN AN ORGANIZED HEALTH CARE EDUCATION/TRAINING PROGRAM

## 2021-05-20 PROCEDURE — 80053 COMPREHEN METABOLIC PANEL: CPT

## 2021-05-20 PROCEDURE — 2709999900 HC NON-CHARGEABLE SUPPLY

## 2021-05-20 PROCEDURE — 82306 VITAMIN D 25 HYDROXY: CPT

## 2021-05-20 PROCEDURE — 83690 ASSAY OF LIPASE: CPT

## 2021-05-20 PROCEDURE — 74011250636 HC RX REV CODE- 250/636: Performed by: EMERGENCY MEDICINE

## 2021-05-20 PROCEDURE — 99223 1ST HOSP IP/OBS HIGH 75: CPT | Performed by: INTERNAL MEDICINE

## 2021-05-20 PROCEDURE — 71260 CT THORAX DX C+: CPT

## 2021-05-20 PROCEDURE — 94762 N-INVAS EAR/PLS OXIMTRY CONT: CPT

## 2021-05-20 PROCEDURE — 74011636637 HC RX REV CODE- 636/637: Performed by: NURSE PRACTITIONER

## 2021-05-20 PROCEDURE — 84443 ASSAY THYROID STIM HORMONE: CPT

## 2021-05-20 PROCEDURE — 74011000636 HC RX REV CODE- 636: Performed by: STUDENT IN AN ORGANIZED HEALTH CARE EDUCATION/TRAINING PROGRAM

## 2021-05-20 PROCEDURE — 82962 GLUCOSE BLOOD TEST: CPT

## 2021-05-20 PROCEDURE — 74011250636 HC RX REV CODE- 250/636: Performed by: INTERNAL MEDICINE

## 2021-05-20 PROCEDURE — 84484 ASSAY OF TROPONIN QUANT: CPT

## 2021-05-20 PROCEDURE — 74011000250 HC RX REV CODE- 250: Performed by: EMERGENCY MEDICINE

## 2021-05-20 PROCEDURE — 70450 CT HEAD/BRAIN W/O DYE: CPT

## 2021-05-20 PROCEDURE — C8929 TTE W OR WO FOL WCON,DOPPLER: HCPCS

## 2021-05-20 PROCEDURE — 96374 THER/PROPH/DIAG INJ IV PUSH: CPT

## 2021-05-20 PROCEDURE — 74011250637 HC RX REV CODE- 250/637: Performed by: NURSE PRACTITIONER

## 2021-05-20 PROCEDURE — 96375 TX/PRO/DX INJ NEW DRUG ADDON: CPT

## 2021-05-20 PROCEDURE — 74011250637 HC RX REV CODE- 250/637: Performed by: STUDENT IN AN ORGANIZED HEALTH CARE EDUCATION/TRAINING PROGRAM

## 2021-05-20 PROCEDURE — 84439 ASSAY OF FREE THYROXINE: CPT

## 2021-05-20 RX ORDER — SODIUM CHLORIDE 0.9 % (FLUSH) 0.9 %
5-10 SYRINGE (ML) INJECTION EVERY 8 HOURS
Status: DISCONTINUED | OUTPATIENT
Start: 2021-05-20 | End: 2021-05-22 | Stop reason: HOSPADM

## 2021-05-20 RX ORDER — METOPROLOL TARTRATE 5 MG/5ML
5 INJECTION INTRAVENOUS ONCE
Status: COMPLETED | OUTPATIENT
Start: 2021-05-20 | End: 2021-05-20

## 2021-05-20 RX ORDER — FUROSEMIDE 10 MG/ML
60 INJECTION INTRAMUSCULAR; INTRAVENOUS
Status: COMPLETED | OUTPATIENT
Start: 2021-05-20 | End: 2021-05-20

## 2021-05-20 RX ORDER — ERGOCALCIFEROL 1.25 MG/1
50000 CAPSULE ORAL
Status: DISCONTINUED | OUTPATIENT
Start: 2021-05-20 | End: 2021-05-22 | Stop reason: HOSPADM

## 2021-05-20 RX ORDER — MELOXICAM 7.5 MG/1
7.5 TABLET ORAL 2 TIMES DAILY
COMMUNITY
End: 2021-06-08 | Stop reason: SINTOL

## 2021-05-20 RX ORDER — LISINOPRIL 10 MG/1
10 TABLET ORAL DAILY
COMMUNITY
End: 2021-05-22

## 2021-05-20 RX ORDER — HYDRALAZINE HYDROCHLORIDE 20 MG/ML
20 INJECTION INTRAMUSCULAR; INTRAVENOUS
Status: DISCONTINUED | OUTPATIENT
Start: 2021-05-20 | End: 2021-05-22 | Stop reason: HOSPADM

## 2021-05-20 RX ORDER — ATORVASTATIN CALCIUM 20 MG/1
20 TABLET, FILM COATED ORAL DAILY
Status: DISCONTINUED | OUTPATIENT
Start: 2021-05-20 | End: 2021-05-22

## 2021-05-20 RX ORDER — MORPHINE SULFATE 4 MG/ML
4 INJECTION INTRAVENOUS
Status: COMPLETED | OUTPATIENT
Start: 2021-05-20 | End: 2021-05-20

## 2021-05-20 RX ORDER — INSULIN LISPRO 100 [IU]/ML
INJECTION, SOLUTION INTRAVENOUS; SUBCUTANEOUS
Status: DISCONTINUED | OUTPATIENT
Start: 2021-05-20 | End: 2021-05-22 | Stop reason: HOSPADM

## 2021-05-20 RX ORDER — SODIUM CHLORIDE 0.9 % (FLUSH) 0.9 %
5-40 SYRINGE (ML) INJECTION EVERY 8 HOURS
Status: DISCONTINUED | OUTPATIENT
Start: 2021-05-20 | End: 2021-05-22 | Stop reason: HOSPADM

## 2021-05-20 RX ORDER — ISOSORBIDE MONONITRATE 30 MG/1
30 TABLET, EXTENDED RELEASE ORAL DAILY
Status: DISCONTINUED | OUTPATIENT
Start: 2021-05-20 | End: 2021-05-20

## 2021-05-20 RX ORDER — SODIUM CHLORIDE 0.9 % (FLUSH) 0.9 %
5-40 SYRINGE (ML) INJECTION AS NEEDED
Status: DISCONTINUED | OUTPATIENT
Start: 2021-05-20 | End: 2021-05-22 | Stop reason: HOSPADM

## 2021-05-20 RX ORDER — FUROSEMIDE 10 MG/ML
60 INJECTION INTRAMUSCULAR; INTRAVENOUS 2 TIMES DAILY
Status: DISCONTINUED | OUTPATIENT
Start: 2021-05-20 | End: 2021-05-22

## 2021-05-20 RX ORDER — LISINOPRIL 20 MG/1
40 TABLET ORAL DAILY
Status: DISCONTINUED | OUTPATIENT
Start: 2021-05-20 | End: 2021-05-20

## 2021-05-20 RX ORDER — MORPHINE SULFATE 2 MG/ML
2 INJECTION, SOLUTION INTRAMUSCULAR; INTRAVENOUS
Status: DISCONTINUED | OUTPATIENT
Start: 2021-05-20 | End: 2021-05-22 | Stop reason: HOSPADM

## 2021-05-20 RX ORDER — ASPIRIN 81 MG/1
81 TABLET ORAL
Status: DISCONTINUED | OUTPATIENT
Start: 2021-05-21 | End: 2021-05-22 | Stop reason: HOSPADM

## 2021-05-20 RX ORDER — SODIUM CHLORIDE 0.9 % (FLUSH) 0.9 %
10 SYRINGE (ML) INJECTION
Status: ACTIVE | OUTPATIENT
Start: 2021-05-20 | End: 2021-05-20

## 2021-05-20 RX ORDER — METOPROLOL SUCCINATE 25 MG/1
25 TABLET, EXTENDED RELEASE ORAL DAILY
Status: DISCONTINUED | OUTPATIENT
Start: 2021-05-20 | End: 2021-05-21

## 2021-05-20 RX ORDER — GUAIFENESIN 100 MG/5ML
324 LIQUID (ML) ORAL
Status: COMPLETED | OUTPATIENT
Start: 2021-05-20 | End: 2021-05-20

## 2021-05-20 RX ORDER — HYDROCODONE BITARTRATE AND ACETAMINOPHEN 5; 325 MG/1; MG/1
1 TABLET ORAL
Status: DISCONTINUED | OUTPATIENT
Start: 2021-05-20 | End: 2021-05-22 | Stop reason: HOSPADM

## 2021-05-20 RX ORDER — SODIUM CHLORIDE 0.9 % (FLUSH) 0.9 %
5-10 SYRINGE (ML) INJECTION AS NEEDED
Status: DISCONTINUED | OUTPATIENT
Start: 2021-05-20 | End: 2021-05-22 | Stop reason: HOSPADM

## 2021-05-20 RX ORDER — NITROGLYCERIN 0.4 MG/1
0.4 TABLET SUBLINGUAL
Status: DISCONTINUED | OUTPATIENT
Start: 2021-05-20 | End: 2021-05-22 | Stop reason: HOSPADM

## 2021-05-20 RX ORDER — FLUOXETINE 10 MG/1
20 CAPSULE ORAL DAILY
Status: DISCONTINUED | OUTPATIENT
Start: 2021-05-20 | End: 2021-05-20

## 2021-05-20 RX ORDER — LOSARTAN POTASSIUM 25 MG/1
25 TABLET ORAL DAILY
Status: DISCONTINUED | OUTPATIENT
Start: 2021-05-20 | End: 2021-05-21

## 2021-05-20 RX ADMIN — HYDROCODONE BITARTRATE AND ACETAMINOPHEN 1 TABLET: 5; 325 TABLET ORAL at 13:43

## 2021-05-20 RX ADMIN — Medication 5 ML: at 13:48

## 2021-05-20 RX ADMIN — ASPIRIN 324 MG: 81 TABLET, CHEWABLE ORAL at 05:24

## 2021-05-20 RX ADMIN — ERGOCALCIFEROL 50000 UNITS: 1.25 CAPSULE ORAL at 13:44

## 2021-05-20 RX ADMIN — FUROSEMIDE 60 MG: 10 INJECTION INTRAMUSCULAR; INTRAVENOUS at 11:00

## 2021-05-20 RX ADMIN — APIXABAN 5 MG: 5 TABLET, FILM COATED ORAL at 20:19

## 2021-05-20 RX ADMIN — INSULIN LISPRO 2 UNITS: 100 INJECTION, SOLUTION INTRAVENOUS; SUBCUTANEOUS at 11:15

## 2021-05-20 RX ADMIN — IOPAMIDOL 100 ML: 755 INJECTION, SOLUTION INTRAVENOUS at 06:34

## 2021-05-20 RX ADMIN — METOPROLOL TARTRATE 5 MG: 5 INJECTION INTRAVENOUS at 08:28

## 2021-05-20 RX ADMIN — FUROSEMIDE 60 MG: 10 INJECTION INTRAMUSCULAR; INTRAVENOUS at 08:28

## 2021-05-20 RX ADMIN — PERFLUTREN 1 ML: 6.52 INJECTION, SUSPENSION INTRAVENOUS at 12:20

## 2021-05-20 RX ADMIN — APIXABAN 5 MG: 5 TABLET, FILM COATED ORAL at 11:00

## 2021-05-20 RX ADMIN — Medication 10 ML: at 21:03

## 2021-05-20 RX ADMIN — FUROSEMIDE 60 MG: 10 INJECTION INTRAMUSCULAR; INTRAVENOUS at 17:38

## 2021-05-20 RX ADMIN — Medication 10 ML: at 11:07

## 2021-05-20 RX ADMIN — MORPHINE SULFATE 4 MG: 4 INJECTION INTRAVENOUS at 08:13

## 2021-05-20 RX ADMIN — Medication 10 ML: at 21:42

## 2021-05-20 RX ADMIN — ATORVASTATIN CALCIUM 20 MG: 20 TABLET, FILM COATED ORAL at 11:00

## 2021-05-20 RX ADMIN — LOSARTAN POTASSIUM 25 MG: 25 TABLET, FILM COATED ORAL at 11:00

## 2021-05-20 RX ADMIN — METOPROLOL SUCCINATE 25 MG: 25 TABLET, EXTENDED RELEASE ORAL at 11:00

## 2021-05-20 NOTE — PROGRESS NOTES
TRANSFER - IN REPORT:    Verbal report received from [place] on Bethany Ashton being received from ED for routine progression of care. Report consisted of patients Situation, Background, Assessment and Recommendations(SBAR). Information from the following report(s) SBAR, Kardex, ED Summary, Intake/Output, MAR, Accordion, Recent Results and Cardiac Rhythm Afib was reviewed. Opportunity for questions and clarification was provided. Assessment completed upon patients arrival to unit and care assumed. Patient received to room 308. Patient connected to monitor and assessment completed. Plan of care reviewed. Patient oriented to room and call light. Patient aware to use call light to communicate any shortness of breath, chest pain or needs. Admission skin assessment completed with second RN and reveals the following:     Skin intact, sacrum/coccyx and heels are free of skin breakdown and/or pressure sores.

## 2021-05-20 NOTE — ED NOTES
TRANSFER - OUT REPORT:    Verbal report given to shahid martínez (name) on Valdez Kern  being transferred to 308 (unit) for routine progression of care       Report consisted of patients Situation, Background, Assessment and   Recommendations(SBAR). Information from the following report(s) ED Summary was reviewed with the receiving nurse. Lines:   Peripheral IV 05/20/21 Left Antecubital (Active)   Site Assessment Clean, dry, & intact 05/20/21 0443   Phlebitis Assessment 0 05/20/21 0443   Infiltration Assessment 0 05/20/21 0443   Dressing Status Clean, dry, & intact 05/20/21 0443   Dressing Type Transparent 05/20/21 0443   Hub Color/Line Status Red 05/20/21 0443        Opportunity for questions and clarification was provided.       Patient transported with:   Monitor

## 2021-05-20 NOTE — ED PROVIDER NOTES
72-year-old male patient presents to the emergency department with reports of worsening shortness of breath. Patient states symptoms have been present for 3 to 4 days. He notes worsened symptoms when lying down to attempt sleep at night. He states he has been unable to sleep secondary to this shortness of breath. He reports minimal cough, denies fever or chills. He does report occasional chest pain at the substernal region of his chest that accompanies shortness of breath with supine positioning. This pain does not radiate. He denies other alleviating or exacerbating factors. On arrival to this department, patient was noted to have a unsteady gait. He describes weakness in both legs. He also reports weakness in the left upper extremity that is been present for 3 to 4 days as well. He denies sudden changes in vision, syncopal episodes, trouble speaking or swallowing. Patient denies significant heart history, pulmonary history or history of stroke. He does report history of hypertension, taking lisinopril. Past Medical History:   Diagnosis Date    Arthritis     Chronic back pain     Colon cancer ([address] 1990    Partial Colectomy and Chemo.     Depression 05/14/2015    managed well with meds    Diabetes ([address]     oral agent only/pt doesnt monitor BS at home/no s.s of hypoglycemia/    Former cigarette smoker     HTN (hypertension) 04/28/2014    controlled well with meds    Hypercholesteremia     controlled well with meds       Past Surgical History:   Procedure Laterality Date    HX COLONOSCOPY      HX LAP CHOLECYSTECTOMY      HX SMALL BOWEL RESECTION  1990    HX WISDOM TEETH EXTRACTION           Family History:   Problem Relation Age of Onset    Cancer Mother         Pancreatic    No Known Problems Father     No Known Problems Sister     Diabetes Brother     Heart Attack Brother     No Known Problems Sister     No Known Problems Brother     Heart Disease Neg Hx        Social History     Socioeconomic History    Marital status: SINGLE     Spouse name: Not on file    Number of children: Not on file    Years of education: Not on file    Highest education level: Not on file   Occupational History    Occupation: Retired (Construction)   Tobacco Use    Smoking status: Former Smoker     Packs/day: 0.25     Years: 6.00     Pack years: 1.50     Quit date: 2/25/2006     Years since quitting: 15.2    Smokeless tobacco: Never Used   Substance and Sexual Activity    Alcohol use: No    Drug use: No    Sexual activity: Not on file   Other Topics Concern    Not on file   Social History Narrative    Not on file     Social Determinants of Health     Financial Resource Strain:     Difficulty of Paying Living Expenses:    Food Insecurity:     Worried About [address] in the Last Year:     [address] [employer] in the Last Year:    Transportation Needs:     Lack of Transportation (Medical):  Lack of Transportation (Non-Medical):    Physical Activity:     Days of Exercise per Week:     Minutes of Exercise per Session:    Stress:     Feeling of Stress :    Social Connections:     Frequency of Communication with Friends and Family:     Frequency of Social Gatherings with Friends and Family:     Attends Druze Services:     Active Member of Clubs or Organizations:     Attends Club or Organization Meetings:     Marital Status:    Intimate Partner Violence:     Fear of Current or Ex-Partner:     Emotionally Abused:     Physically Abused:     Sexually Abused: ALLERGIES: Patient has no known allergies. Review of Systems   Constitutional: Negative for chills, diaphoresis and fever. HENT: Negative for congestion, sneezing and sore throat. Eyes: Negative for visual disturbance. Respiratory: Positive for cough and shortness of breath. Negative for chest tightness and wheezing. Cardiovascular: Positive for chest pain. Negative for leg swelling. Gastrointestinal: Negative for abdominal pain, blood in stool, diarrhea, nausea and vomiting. Endocrine: Negative for polyuria. Genitourinary: Negative for difficulty urinating, dysuria, flank pain, hematuria and urgency. Musculoskeletal: Negative for back pain, myalgias, neck pain and neck stiffness. Skin: Negative for color change and rash. Neurological: Positive for weakness and light-headedness. Negative for dizziness, syncope, speech difficulty, numbness and headaches. Psychiatric/Behavioral: Negative for behavioral problems. All other systems reviewed and are negative. Vitals:    05/20/21 0441   BP: (!) 177/135   Pulse: (!) 101   Resp: 20   Temp: 97.7 °F (36.5 °C)   SpO2: 93%   Weight: 104.3 kg (230 lb)   Height: 6' 4\" (1.93 m)            Physical Exam  Vitals and nursing note reviewed. Constitutional:       General: He is not in acute distress. Appearance: He is well-developed. He is not diaphoretic. Comments: Fatigued appearing male patient, alert and oriented to person place and time. No acute distress, speaks in clear, fluid sentences. HENT:      Head: Normocephalic and atraumatic. Right Ear: External ear normal.      Left Ear: External ear normal.      Nose: Nose normal.   Eyes:      Pupils: Pupils are equal, round, and reactive to light. Cardiovascular:      Rate and Rhythm: Normal rate and regular rhythm. Heart sounds: Normal heart sounds. No murmur heard. No friction rub. No gallop. Pulmonary:      Effort: Pulmonary effort is normal. No respiratory distress. Breath sounds: Normal breath sounds. No stridor. No decreased breath sounds, wheezing, rhonchi or rales. Chest:      Chest wall: No tenderness. Abdominal:      General: There is no distension. Palpations: Abdomen is soft. There is no mass. Tenderness: There is no abdominal tenderness. There is no guarding or rebound. Hernia: No hernia is present.    Musculoskeletal: General: No tenderness or deformity. Normal range of motion. Cervical back: Normal range of motion. Skin:     General: Skin is warm and dry. Neurological:      General: No focal deficit present. Mental Status: He is alert and oriented to person, place, and time. GCS: GCS eye subscore is 4. GCS verbal subscore is 5. GCS motor subscore is 6. Cranial Nerves: No cranial nerve deficit. Motor: Weakness present. Coordination: Coordination is intact. Gait: Gait abnormal.      Comments: There is appreciable weakness in the left upper extremity with comparison to the right. Bilateral lower extremities are weak on exam.  Finger-nose testing, no significant pronator drift. Sensation is intact. No other focal neuro deficits.           MDM       Procedures

## 2021-05-20 NOTE — ED TRIAGE NOTES
Arrives with face mask in place. Reports shortness of breath, right elbow pain, generalized weakness, dizziness, \"pressure\" to mid chest, n/v. Onset of symptoms 0000. Denies cough, fever/chills, head pain, vision changes, numbness/tingling, difficulty speaking. HTN on arrival, reports compliance with meds. Denies smoker.

## 2021-05-20 NOTE — H&P
[facility] CARDIOLOGY History &Physical                 Primary Cardiologist: Dr Jimbo Germain in the Past (2017)    Primary Care Physician: Terra Hill MD    Admitting Physician: Dr Negerte Poster:     Patient is a 63 y.o. male with a hx of hypertension, former cigarette smoker, diabetes mellitus, hyperlipidemia, depression, colon cancer status post colectomy and chemo, chronic back pain, arthritis, and rate control atrial fibrillation only on aspirin. Patient does report 3 to 4 days of increased shortness of breath and orthopnea at home so point that he need to be seen in the emergency department. In the department he is found to be extremely hypertensive with hypertensive urgency/hypertensive emergency blood pressure above 039 systolic. CT of the head was negative for acute abnormalities and CT of the chest after elevated D-dimer showed no pulmonary embolism but showed pulmonary edema. Emergency department patient received 60 of IV Lasix, 3 to 24 mg aspirin, 5 Lopressor, and 4 mg of morphine. Patient showed good urinary output with 1100 cc put out so far. Currently denies any chest pain, weakness, dizziness, nausea, vomiting, fever, exposure COVID-19, or any other recent changes to medicines. Patient has a known history of atrial fibrillation but has never been on oral anticoagulant in the past due to low chads score. Since then the patient has developed diabetes mellitus and has been put on medication for this as well elevate his [id number] order 2. Past Medical History:   Diagnosis Date    Arthritis     Chronic back pain     [address] ([facility] [address] 1990    Partial Colectomy and Chemo.     Depression 05/14/2015    managed well with meds    Diabetes ([address]     oral agent only/pt doesnt monitor BS at home/no s.s of hypoglycemia/    Former cigarette smoker     HTN (hypertension) 04/28/2014    controlled well with meds    Hypercholesteremia     controlled well with meds      Past Surgical History:   Procedure Laterality Date    HX COLONOSCOPY      HX LAP CHOLECYSTECTOMY      HX SMALL BOWEL RESECTION  1990    HX WISDOM TEETH EXTRACTION        No Known Allergies  Social History     Tobacco Use    Smoking status: Former Smoker     Packs/day: 0.25     Years: 6.00     Pack years: 1.50     Quit date: 2/25/2006     Years since quitting: 15.2    Smokeless tobacco: Never Used   Substance Use Topics    Alcohol use: No      FH:   Family History   Problem Relation Age of Onset    Cancer Mother         Pancreatic    No Known Problems Father     No Known Problems Sister     Diabetes Brother     Heart Attack Brother     No Known Problems Sister     No Known Problems Brother     Heart Disease Neg Hx         Review of Systems   Constitutional: Negative for weight gain and weight loss. HENT: Negative. Eyes: Negative. Cardiovascular: Positive for dyspnea on exertion, leg swelling and orthopnea. Negative for chest pain (currently pain free), claudication, cyanosis, irregular heartbeat, near-syncope, palpitations, paroxysmal nocturnal dyspnea and syncope. Respiratory: Positive for shortness of breath. Negative for cough and wheezing. Endocrine: Negative. Skin: Negative. Musculoskeletal: Negative. Gastrointestinal: Negative for nausea and vomiting. Genitourinary: Negative. Neurological: Negative for dizziness. Psychiatric/Behavioral: Negative. Allergic/Immunologic: Negative. Objective:       Visit Vitals  BP (!) 150/121   Pulse 66   Temp 97.7 °F (36.5 °C)   Resp 15   Ht 6' 4\" (1.93 m)   Wt 104.3 kg (230 lb)   SpO2 96%   BMI 28.00 kg/m²       Last 3 Recorded Weights in this Encounter    05/20/21 0441   Weight: 104.3 kg (230 lb)       05/20 0701 - 05/20 1900  In: -   Out: 1100 [Urine:1100]  No intake/output data recorded.       Physical Exam:  General: Well Developed, Well Nourished, No Acute Distress  HEENT: pupils equal and round, no abnormalities noted  Neck: supple, no JVD, no carotid bruits  Heart: S1S2 with RRR without murmurs or gallops  Lungs: Diminished with slight crackles heard throughout bases, patient sitting upright position unable to lay flat  Abd: soft, nontender, nondistended, with good bowel sounds  Ext: warm, Mild edema, calves supple/nontender, pulses 2+ bilaterally  Skin: warm and dry  Psychiatric: Normal mood and affect  Neurologic: Alert and oriented X 3      Data Review:   Recent Labs     05/20/21  0446      K 4.2   MG 1.9   BUN 19   CREA 1.17   *   WBC 8.3   HGB 14.6   HCT 45.2      BNPNT 6,326*         CXR Results  (Last 48 hours)               05/20/21 0543  XR CHEST PORT Final result    Impression:  Cardiomegaly with pulmonary vascular congestion particularly at the lung bases. Narrative:  EXAM: TEMPORARY       HISTORY: Shortness of breath. TECHNIQUE: A single frontal view of the chest was submitted. COMPARISON: None available. FINDINGS:    There is mild cardiomegaly. Pulmonary vascular prominence at the lung bases. The mediastinum is within normal limits. There is no consolidation, pleural effusion, or pneumothorax. No significant osseous abnormalities are observed. Assessment/Plan:   Principal Problem:    Hypertensive urgency (5/20/2021) patient short of breath with volume overload with crackles heard in bases and decreased lung sounds. Patient endorses orthopnea but has never had a diagnosis of heart failure in the past.  Patient was found to have grossly elevated blood pressure. Initial treatment with IV beta-blockers emergency department showed little to no relief. We will stop the patient's home Norvasc 10 mg due to lower limb edema and add Cozaar 25 mg and Toprol XL 25 mg daily. Will titrate medicines as admission while using IV Lasix for diuresing. Check echocardiogram, TSH, free T4, vitamin D, daily BMP, daily mag, and vital signs per routine.   Patient also be on strict SARAH's with cardiac diabetic diet, sodium restriction, 2 L/day restrictions. Further recommendations pending clinical course attending recommendations    Active Problems:    HLD (hyperlipidemia) (5/14/2015) history of hyperlipidemia currently on Lipitor 20 we will check lipid panel        Vitamin D deficiency (5/14/2015) check vitamin D and replace as necessary       Volume overload (5/20/2021) patient appears to be volume overload secondary to hypertensive urgency. Signs symptoms also concerning for possible heart failure with reduced ejection fraction. Will check echocardiogram to confirm. With hypertensive urgency we will start beta-blocker and ARB therapy. If echocardiogram shows depressed ejection fraction may need to consider for SGL 2 inhibitor with history of diabetes and decreased EF. We will also check TSH, free T4, and vitamin D. Patient will be on a sodium restriction, fluid restricted, and cardiac diabetic diet. We will continue diuresis 60 mg IV Lasix twice daily. Diabetes mellitus type 2, controlled ([address] (5/20/2021) patient has a listed history of diabetes mellitus with last hemoglobin A1c that can be seen from 2015 at 6.2. Will check Hgb A1C. We will stop Metformin due to contrast use for CT and possible heart cath this admission. Will add SSI for overage but may need to be considered for SGL 2 inhibitor if echo shows decreased EF. Atrial fibrillation ([address] (5/20/2021) atrial fibrillation that is rate controlled. Review of EKG showed patient has had atrial for for long time with suspected at this point is chronic. Patient is now [id number] of 2 with hypertension diabetes despite age we will start on Eliquis 5 mg twice daily. Also check TSH, free T4, and echocardiogram.        I have reviewed this note in its entirety and edited where appropriate. I have performed an independent exam and/or observed the history taking.  All portions of the E/M have been edited and reviewed prior to signing note.   Trista Bonilla MD  05/20/21      Vik Ellsworth NP  5/20/2021  9:18 AM

## 2021-05-20 NOTE — ED NOTES
Patient checked out to me at shift change. In summary he is a 57-year-old gentleman who has had longstanding A. fib since 2017 but apparently has not been following with cardiology has only been taking an aspirin. He is here today because of progressive shortness of breath and generally not feeling well. His EKG interpreted by me currently shows A. fib with a rate in the 90s. On exam he has an irregularly irregular rhythm. CT scan shows pleural effusion with no evidence for PE or pneumonia. I will discuss the case with cardiology.

## 2021-05-21 LAB
ANION GAP SERPL CALC-SCNC: 5 MMOL/L (ref 7–16)
BUN SERPL-MCNC: 22 MG/DL (ref 6–23)
CALCIUM SERPL-MCNC: 8.9 MG/DL (ref 8.3–10.4)
CHLORIDE SERPL-SCNC: 105 MMOL/L (ref 98–107)
CHOLEST SERPL-MCNC: 116 MG/DL
CO2 SERPL-SCNC: 30 MMOL/L (ref 21–32)
CREAT SERPL-MCNC: 1.08 MG/DL (ref 0.8–1.5)
ERYTHROCYTE [DISTWIDTH] IN BLOOD BY AUTOMATED COUNT: 14.6 % (ref 11.9–14.6)
GLUCOSE BLD STRIP.AUTO-MCNC: 111 MG/DL (ref 65–100)
GLUCOSE BLD STRIP.AUTO-MCNC: 113 MG/DL (ref 65–100)
GLUCOSE BLD STRIP.AUTO-MCNC: 123 MG/DL (ref 65–100)
GLUCOSE BLD STRIP.AUTO-MCNC: 148 MG/DL (ref 65–100)
GLUCOSE SERPL-MCNC: 105 MG/DL (ref 65–100)
HCT VFR BLD AUTO: 45.3 % (ref 41.1–50.3)
HDLC SERPL-MCNC: 24 MG/DL (ref 40–60)
HDLC SERPL: 4.8 {RATIO}
HGB BLD-MCNC: 14.9 G/DL (ref 13.6–17.2)
LDLC SERPL CALC-MCNC: 74 MG/DL
MAGNESIUM SERPL-MCNC: 2 MG/DL (ref 1.8–2.4)
MCH RBC QN AUTO: 28.7 PG (ref 26.1–32.9)
MCHC RBC AUTO-ENTMCNC: 32.9 G/DL (ref 31.4–35)
MCV RBC AUTO: 87.1 FL (ref 79.6–97.8)
NRBC # BLD: 0 K/UL (ref 0–0.2)
PLATELET # BLD AUTO: 201 K/UL (ref 150–450)
PMV BLD AUTO: 10.8 FL (ref 9.4–12.3)
POTASSIUM SERPL-SCNC: 3.6 MMOL/L (ref 3.5–5.1)
RBC # BLD AUTO: 5.2 M/UL (ref 4.23–5.6)
SERVICE CMNT-IMP: ABNORMAL
SODIUM SERPL-SCNC: 140 MMOL/L (ref 136–145)
TRIGL SERPL-MCNC: 90 MG/DL (ref 35–150)
VLDLC SERPL CALC-MCNC: 18 MG/DL (ref 6–23)
WBC # BLD AUTO: 7.9 K/UL (ref 4.3–11.1)

## 2021-05-21 PROCEDURE — 36415 COLL VENOUS BLD VENIPUNCTURE: CPT

## 2021-05-21 PROCEDURE — 74011000250 HC RX REV CODE- 250: Performed by: INTERNAL MEDICINE

## 2021-05-21 PROCEDURE — 85027 COMPLETE CBC AUTOMATED: CPT

## 2021-05-21 PROCEDURE — 99152 MOD SED SAME PHYS/QHP 5/>YRS: CPT | Performed by: INTERNAL MEDICINE

## 2021-05-21 PROCEDURE — 93458 L HRT ARTERY/VENTRICLE ANGIO: CPT | Performed by: INTERNAL MEDICINE

## 2021-05-21 PROCEDURE — 77030015766

## 2021-05-21 PROCEDURE — 80048 BASIC METABOLIC PNL TOTAL CA: CPT

## 2021-05-21 PROCEDURE — 74011250637 HC RX REV CODE- 250/637: Performed by: INTERNAL MEDICINE

## 2021-05-21 PROCEDURE — 74011250636 HC RX REV CODE- 250/636: Performed by: NURSE PRACTITIONER

## 2021-05-21 PROCEDURE — 99152 MOD SED SAME PHYS/QHP 5/>YRS: CPT

## 2021-05-21 PROCEDURE — 99232 SBSQ HOSP IP/OBS MODERATE 35: CPT | Performed by: INTERNAL MEDICINE

## 2021-05-21 PROCEDURE — 93458 L HRT ARTERY/VENTRICLE ANGIO: CPT

## 2021-05-21 PROCEDURE — 77030019905 HC CATH URETH INTMIT MDII -A

## 2021-05-21 PROCEDURE — 74011250636 HC RX REV CODE- 250/636: Performed by: INTERNAL MEDICINE

## 2021-05-21 PROCEDURE — C1769 GUIDE WIRE: HCPCS

## 2021-05-21 PROCEDURE — 2709999900 HC NON-CHARGEABLE SUPPLY

## 2021-05-21 PROCEDURE — 65660000000 HC RM CCU STEPDOWN

## 2021-05-21 PROCEDURE — 77030020847 HC STATLOK BARD -A

## 2021-05-21 PROCEDURE — 77030016699 HC CATH ANGI DX INFN1 CARD -A

## 2021-05-21 PROCEDURE — 77030042317 HC BND COMPR HEMSTAT -B

## 2021-05-21 PROCEDURE — 74011000636 HC RX REV CODE- 636: Performed by: INTERNAL MEDICINE

## 2021-05-21 PROCEDURE — 80061 LIPID PANEL: CPT

## 2021-05-21 PROCEDURE — 83735 ASSAY OF MAGNESIUM: CPT

## 2021-05-21 PROCEDURE — C1894 INTRO/SHEATH, NON-LASER: HCPCS

## 2021-05-21 PROCEDURE — 74011250637 HC RX REV CODE- 250/637: Performed by: NURSE PRACTITIONER

## 2021-05-21 PROCEDURE — 4A023N7 MEASUREMENT OF CARDIAC SAMPLING AND PRESSURE, LEFT HEART, PERCUTANEOUS APPROACH: ICD-10-PCS | Performed by: INTERNAL MEDICINE

## 2021-05-21 PROCEDURE — 82962 GLUCOSE BLOOD TEST: CPT

## 2021-05-21 PROCEDURE — 51798 US URINE CAPACITY MEASURE: CPT

## 2021-05-21 RX ORDER — MIDAZOLAM HYDROCHLORIDE 1 MG/ML
.5-2 INJECTION, SOLUTION INTRAMUSCULAR; INTRAVENOUS
Status: DISCONTINUED | OUTPATIENT
Start: 2021-05-21 | End: 2021-05-22 | Stop reason: HOSPADM

## 2021-05-21 RX ORDER — METOPROLOL SUCCINATE 25 MG/1
50 TABLET, EXTENDED RELEASE ORAL 2 TIMES DAILY
Status: DISCONTINUED | OUTPATIENT
Start: 2021-05-21 | End: 2021-05-22

## 2021-05-21 RX ORDER — HEPARIN SODIUM 200 [USP'U]/100ML
3 INJECTION, SOLUTION INTRAVENOUS CONTINUOUS
Status: DISCONTINUED | OUTPATIENT
Start: 2021-05-21 | End: 2021-05-22 | Stop reason: HOSPADM

## 2021-05-21 RX ORDER — SPIRONOLACTONE 25 MG/1
25 TABLET ORAL DAILY
Status: DISCONTINUED | OUTPATIENT
Start: 2021-05-21 | End: 2021-05-22 | Stop reason: HOSPADM

## 2021-05-21 RX ORDER — LOSARTAN POTASSIUM 50 MG/1
50 TABLET ORAL DAILY
Status: DISCONTINUED | OUTPATIENT
Start: 2021-05-21 | End: 2021-05-21

## 2021-05-21 RX ORDER — METOPROLOL SUCCINATE 25 MG/1
50 TABLET, EXTENDED RELEASE ORAL DAILY
Status: DISCONTINUED | OUTPATIENT
Start: 2021-05-21 | End: 2021-05-21

## 2021-05-21 RX ORDER — LIDOCAINE HYDROCHLORIDE 10 MG/ML
1-30 INJECTION, SOLUTION EPIDURAL; INFILTRATION; INTRACAUDAL; PERINEURAL ONCE
Status: COMPLETED | OUTPATIENT
Start: 2021-05-21 | End: 2021-05-21

## 2021-05-21 RX ADMIN — Medication 10 ML: at 13:08

## 2021-05-21 RX ADMIN — METOPROLOL SUCCINATE 50 MG: 25 TABLET, EXTENDED RELEASE ORAL at 17:53

## 2021-05-21 RX ADMIN — HEPARIN SODIUM 2 ML: 10000 INJECTION, SOLUTION INTRAVENOUS; SUBCUTANEOUS at 08:20

## 2021-05-21 RX ADMIN — HYDRALAZINE HYDROCHLORIDE 20 MG: 20 INJECTION, SOLUTION INTRAMUSCULAR; INTRAVENOUS at 08:23

## 2021-05-21 RX ADMIN — FUROSEMIDE 60 MG: 10 INJECTION INTRAMUSCULAR; INTRAVENOUS at 09:53

## 2021-05-21 RX ADMIN — MIDAZOLAM 2 MG: 1 INJECTION INTRAMUSCULAR; INTRAVENOUS at 08:18

## 2021-05-21 RX ADMIN — FUROSEMIDE 60 MG: 10 INJECTION INTRAMUSCULAR; INTRAVENOUS at 17:54

## 2021-05-21 RX ADMIN — SPIRONOLACTONE 25 MG: 25 TABLET ORAL at 09:53

## 2021-05-21 RX ADMIN — IOPAMIDOL 90 ML: 755 INJECTION, SOLUTION INTRAVENOUS at 08:37

## 2021-05-21 RX ADMIN — MORPHINE SULFATE 2 MG: 2 INJECTION, SOLUTION INTRAMUSCULAR; INTRAVENOUS at 13:51

## 2021-05-21 RX ADMIN — SACUBITRIL AND VALSARTAN 1 TABLET: 24; 26 TABLET, FILM COATED ORAL at 21:17

## 2021-05-21 RX ADMIN — HEPARIN SODIUM 3 ML/HR: 200 INJECTION, SOLUTION INTRAVENOUS at 08:09

## 2021-05-21 RX ADMIN — Medication 10 ML: at 06:54

## 2021-05-21 RX ADMIN — LIDOCAINE HYDROCHLORIDE 2 ML: 10 INJECTION, SOLUTION EPIDURAL; INFILTRATION; INTRACAUDAL; PERINEURAL at 08:20

## 2021-05-21 RX ADMIN — Medication 10 ML: at 21:19

## 2021-05-21 RX ADMIN — ATORVASTATIN CALCIUM 20 MG: 20 TABLET, FILM COATED ORAL at 09:53

## 2021-05-21 RX ADMIN — Medication 10 ML: at 21:18

## 2021-05-21 RX ADMIN — ASPIRIN 81 MG: 81 TABLET, COATED ORAL at 07:00

## 2021-05-21 RX ADMIN — SACUBITRIL AND VALSARTAN 1 TABLET: 24; 26 TABLET, FILM COATED ORAL at 09:53

## 2021-05-21 RX ADMIN — METOPROLOL SUCCINATE 50 MG: 25 TABLET, EXTENDED RELEASE ORAL at 09:53

## 2021-05-21 RX ADMIN — Medication 10 ML: at 06:12

## 2021-05-21 RX ADMIN — APIXABAN 5 MG: 5 TABLET, FILM COATED ORAL at 09:53

## 2021-05-21 RX ADMIN — APIXABAN 5 MG: 5 TABLET, FILM COATED ORAL at 21:17

## 2021-05-21 RX ADMIN — HYDRALAZINE HYDROCHLORIDE 20 MG: 20 INJECTION, SOLUTION INTRAMUSCULAR; INTRAVENOUS at 06:21

## 2021-05-21 NOTE — ROUTINE PROCESS
Bedside and Verbal shift change report given to [address] (oncoming nurse) by self (offgoing nurse). Report included the following information SBAR, Kardex, MAR, Recent Results and Med Rec Status.

## 2021-05-21 NOTE — ROUTINE PROCESS
TRANSFER - IN REPORT: 
 
Verbal report received from Jose Angel (name) on Ramandeep Barbosa  being received from [facility] (unit) for routine progression of care Report consisted of patients Situation, Background, Assessment and  
Recommendations(SBAR). Information from the following report(s) SBAR, Kardex, Procedure Summary and Recent Results was reviewed with the receiving nurse. Opportunity for questions and clarification was provided. Assessment completed upon patients arrival to unit and care assumed.

## 2021-05-21 NOTE — PROGRESS NOTES
TRANSFER - OUT REPORT:    Verbal report given to  RN on [facility]  being transferred to [facility] for routine progression of care       Report consisted of patients Situation, Background, Assessment and Recommendations(SBAR). Information from the following report(s) SBAR, Kardex, Procedure Summary and MAR was reviewed with the receiving nurse. Opportunity for questions and clarification was provided.       [facility] with Dr Nuria Harman  No intervention  2 versed  Right radial Rband 12ml at 0840    20 hydralazine

## 2021-05-21 NOTE — PROGRESS NOTES
Comprehensive Nutrition Assessment    Type and Reason for Visit: Initial, Positive nutrition screen  Best Practice Alert for Malnutrition Screening Tool: Recently Lost Weight Without Trying: No, If Yes, How Much Weight Loss: 14 - 23 lbs, Eating Poorly Due to Decreased Appetite: No    Nutrition Recommendations/Plan:    Continue current diet     Malnutrition Assessment:  Malnutrition Status: No malnutrition    Nutrition Assessment:   Nutrition History: Patient reports eating normally at home. He denies any changes to diet or weight. Nutrition Background: Patient with PMH significant for colon cancer s/p partial colectomy and chemo, DM, HTN, HLD, Afib. He presented with shortness of breath and admitted with hypertensive urgency. Now s/p LHC and new CHF. Daily Update:  Patient seen in bed. He denies any barriers to PO currently. He states that he is hungry after being NPO this am for heart cath. Noted lunch tray no eaten. He states that after abel cath placed is food was cold. Offered to warm for he. Warmed lunch and patient eating when RD leaving. Nutrition Related Findings:   No physical signs of malnutrition      Current Nutrition Therapies:  DIET CARDIAC Regular    Anthropometric Measures:  Height: 6' 4\" (193 cm)  Current Body Wt: 98.4 kg (216 lb 14.9 oz) (5/21), Weight source: Standing scale  BMI: 26.4, Overweight (BMI 25.0-29. 9)  Admission Body Weight: 219 lb 15.3 oz  Ideal Body Weight (lbs) (Calculated): 202 lbs (92 kg), 107.4 %  Usual Body Wt:  (No weight history since 2017, which was ~220#), Percent weight change:            Edema: LLE: 1+;Non-pitting (5/21/2021  7:36 AM)  RLE: 1+;Non-pitting (5/21/2021  7:36 AM)     Estimated Daily Nutrient Needs:  Energy (kcal/day): 5183-0923 (Kcal/kg (20-25), Weight Used: Current (98.4 kg (5/21)))  Protein (g/day):  (20% kcal) Weight Used: (Current)  Fluid (ml/day):   (1 ml/kcal)    Nutrition Diagnosis:   · No nutrition diagnosis at this time Nutrition Interventions:   Food and/or Nutrient Delivery: Continue current diet          Goals:       Active Goal: Meet 100% nutrition needs    Nutrition Monitoring and Evaluation:      Food/Nutrient Intake Outcomes: Food and nutrient intake       Discharge Planning:    No discharge needs at this time    [address] [place], LD on 5/21/2021 at 3:35 PM  Contact: [phone]

## 2021-05-21 NOTE — PROGRESS NOTES
Educated pt on importance of ensuring that he is able to void appropriately before removing abel. Pt demanded that it be removed. Abel removed. Will continue to monitor to ensure pt is voiding.

## 2021-05-21 NOTE — ROUTINE PROCESS
Bedside and Verbal shift change report given to self (oncoming nurse) by Kishan Shi (offgoing nurse). Report included the following information SBAR, Kardex, ED Summary, Intake/Output, MAR and Recent Results.

## 2021-05-21 NOTE — PROGRESS NOTES
Pt alert in bed. Pt c/o abel cath. Explained that because of urinary retention, cath will need to remain in place for the time being. Cath clamped to attempt bladder retraining. Will continue to monitor.

## 2021-05-21 NOTE — PROGRESS NOTES
Pt arrived to [facility] on 5/20 c/o increased SOB and orthopnea at home. In the ED, he is found to be extremely hypertensive with his BP above 215 systolic. CT of the head was negative for acute abnormalities and CT of the chest after elevated D-dimer showed no pulmonary embolism but showed pulmonary edema. MD reported that he has diuresed well from yesterday and is breathing much easier. Echocardiogram shows EF 35% and appears to have mild pulmonary hypertension. PCP and insurance verified. No d/c needs identified but will continue to be available. Care Management Interventions  PCP Verified by CM: Yes (Amalia Batista MD)  Mode of Transport at Discharge:  Other (see comment) (Family)  Transition of Care Consult (CM Consult): Discharge Planning  Current Support Network: Family Lives [place], Own Home  Confirm Follow Up Transport: Family  The Plan for Transition of Care is Related to the Following Treatment Goals : Return home and back to his baseline  Discharge Location  Discharge Placement: Home

## 2021-05-21 NOTE — PROGRESS NOTES
am  5/21/2021 6:30 AM    Admit Date: 5/20/2021    Admit Diagnosis: Hypertensive urgency [I16.0]; Volume overload [E87.70]      Subjective:    Patient : Has diuresed well from yesterday and is breathing much easier. Review of the echocardiogram shows ejection fraction 35%. Also appears to have mild pulmonary hypertension. Objective:      Visit Vitals  BP (!) 162/121   Pulse 65   Temp 97.8 °F (36.6 °C)   Resp 18   Ht 6' 4\" (1.93 m)   Wt 216 lb 14.4 oz (98.4 kg)   SpO2 97%   BMI 26.40 kg/m²       ROS:  General ROS: negative for - chills  Hematological and Lymphatic ROS: negative for - blood clots or jaundice  Respiratory ROS: no cough, shortness of breath, or wheezing  Cardiovascular ROS: no chest pain or dyspnea on exertion  Gastrointestinal ROS: no abdominal pain, change in bowel habits, or black or bloody stools  Neurological ROS: no TIA or stroke symptoms    Physical Exam:      Physical Examination: General appearance - Appearance: alert, well appearing, and in no distress.    Neck/lymph - supple, no significant adenopathy  Chest/CV - clear to auscultation, no wheezes, rales or rhonchi, symmetric air entry  Heart - normal rate, regular rhythm, normal S1, S2, no murmurs, rubs, clicks or gallops  Abdomen/GI - soft, nontender, nondistended, no masses or organomegaly   Musculoskeletal - no joint tenderness, deformity or swelling  Extremities - peripheral pulses normal, no pedal edema, no clubbing or cyanosis  Skin - normal coloration and turgor, no rashes, no suspicious skin lesions noted    Current Facility-Administered Medications   Medication Dose Route Frequency    sodium chloride (NS) flush 5-10 mL  5-10 mL IntraVENous Q8H    sodium chloride (NS) flush 5-10 mL  5-10 mL IntraVENous PRN    insulin lispro (HUMALOG) injection   SubCUTAneous AC&HS    aspirin delayed-release tablet 81 mg  81 mg Oral 7am    atorvastatin (LIPITOR) tablet 20 mg  20 mg Oral DAILY    sodium chloride (NS) flush 5-40 mL  5-40 mL IntraVENous Q8H    sodium chloride (NS) flush 5-40 mL  5-40 mL IntraVENous PRN    nitroglycerin (NITROSTAT) tablet 0.4 mg  0.4 mg SubLINGual Q5MIN PRN    morphine injection 2 mg  2 mg IntraVENous Q4H PRN    metoprolol succinate (TOPROL-XL) XL tablet 25 mg  25 mg Oral DAILY    losartan (COZAAR) tablet 25 mg  25 mg Oral DAILY    apixaban (ELIQUIS) tablet 5 mg  5 mg Oral BID    furosemide (LASIX) injection 60 mg  60 mg IntraVENous BID    hydrALAZINE (APRESOLINE) 20 mg/mL injection 20 mg  20 mg IntraVENous Q6H PRN    ergocalciferol capsule 50,000 Units  50,000 Units Oral Q7D    HYDROcodone-acetaminophen (NORCO) 5-325 mg per tablet 1 Tablet  1 Tablet Oral Q6H PRN       Data Review: data included in this note has been independently reviewed by the author   BMP:   Lab Results   Component Value Date/Time     05/21/2021 05:10 AM    K 3.6 05/21/2021 05:10 AM     05/21/2021 05:10 AM    CO2 30 05/21/2021 05:10 AM    AGAP 5 (L) 05/21/2021 05:10 AM     (H) 05/21/2021 05:10 AM    BUN 22 05/21/2021 05:10 AM    CREA 1.08 05/21/2021 05:10 AM    GFRAA >60 05/21/2021 05:10 AM    GFRNA >60 05/21/2021 05:10 AM     CMP:   Lab Results   Component Value Date/Time     05/21/2021 05:10 AM    K 3.6 05/21/2021 05:10 AM     05/21/2021 05:10 AM    CO2 30 05/21/2021 05:10 AM    AGAP 5 (L) 05/21/2021 05:10 AM     (H) 05/21/2021 05:10 AM    BUN 22 05/21/2021 05:10 AM    CREA 1.08 05/21/2021 05:10 AM    GFRAA >60 05/21/2021 05:10 AM    GFRNA >60 05/21/2021 05:10 AM    CA 8.9 05/21/2021 05:10 AM    MG 2.0 05/21/2021 05:10 AM     CBC:   Lab Results   Component Value Date/Time    WBC 7.9 05/21/2021 05:10 AM    HGB 14.9 05/21/2021 05:10 AM    HCT 45.3 05/21/2021 05:10 AM     05/21/2021 05:10 AM        TELEMETRY: AF    Assessment/Plan:     Principal Problem:    Hypertensive urgency (5/20/2021)  Blood pressure still elevated will need to continue to make adjustments on his heart failure and hypertensive medications    Active Problems:    HLD (hyperlipidemia) (5/14/2015)  Assess lipid panel and start a statin if appropriate      Vitamin D deficiency (5/14/2015)  Mildly decreased would benefit from outpatient nonprescription vitamin D      Volume overload (5/20/2021)  Has responded well to diuresis continue to adjust heart failure medicines      Diabetes mellitus type 2, controlled ([address] (5/20/2021)  Monitor inpatient blood sugar      Atrial fibrillation ([address] (5/20/2021)  Already rate controlled we will introduce oral anticoagulation             Ariel López MD

## 2021-05-21 NOTE — PROGRESS NOTES
Report received from [facility] Cath Lab RN. Procedural findings communicated. Intra procedural  medication administration reviewed. Progression of care discussed.      Patient received into [id number] [facility] 5 post sheath removal.     Access site without bleeding or swelling yes    Dressing dry and intact yes    Patient instructed to limit movement to right upper extremity    Routine post procedural vital signs and site assessment initiated yes

## 2021-05-21 NOTE — ROUTINE PROCESS
Bedside and Verbal shift change report given to self (oncoming nurse) by Janette Sutton (offgoing nurse). Report included the following information SBAR, Kardex, MAR, Recent Results and Med Rec Status.

## 2021-05-21 NOTE — PROCEDURES
Cardiac Catheterization Procedure Note    Patient ID:     Name: Vidya Smith   Medical Record Number: [id number]   YOB: 1960    Date of Procedure: 5/21/2021     Pre-procedure Diagnosis:  Congestive Heart Failure    Post-procedure Diagnosis: Congestive Heart Failure    Reason for Procedure: Cardiomyopathy    Blood loss less than 5 ml    Sedation. Pt received 2 mg versed and 0 mcg fentanyl for monitored conscious sedation from 819to 840. independent trained observer to assist in the monitoring of the patient's level of consciousness and physiological status was present    Nurse guerrier    Specimen: None    No complications    No assistants    Time out, Mallampati, and ASA performed    Procedure:  After informed consent, patient was prepped and draped in the usual sterile fashion. Right radial approach was used. 90cc Visipaque contrast were utilized for the entire procedure. no closure device used. Nidia Shingles PIGTAIL        FINDINGS    Left Ventricle: Overall left ventricular systolic function is visually estimated at 30% with global hypokinesis throughout  LVEDP: Aortic pressure 147/96 left ventricular end-diastolic pressure is 20    Left Main: Left main arises off the left cusp in a normal fashion is a large-caliber vessel with minimal irregularity    Left Anterior descending coronary artery: LAD courses to the apex is a large caliber vessel with diffuse mild to moderate 30 to 40% plaquing there are 2 diagonals present that each have diffuse moderate disease       Left Circumflex coronary artery: Diffuse moderate disease throughout the circumflex and 2 OM branches        Right coronary artery: This vessel is a dominant vessel arising off the right cusp coursing in the AV groove the right coronary artery supplies a conus branch and RV branch and a posterior lateral and posterior descending. The posterior descending and posterior lateral have diffuse mild disease.   The right coronary artery has diffuse mild disease            Graft anatomy: NA    Intervention if done: NA    Conclusions: Nonischemic dilated cardiomyopathy with ejection fraction 30 to 35% with diffuse mild nonobstructive coronary disease    Recommentations: Optimal medical therapy for dilated cardiomyopathy and atrial fibrillation    No complications    Family and or significant other were sought out and discussion of the procedure and findings took place. Procedure and findings including pertinent sequele were discussed with the patient immediately post procedure. Opportunity to ask questions was offered. If no one was available in the post procedure waiting area, I can be reached thru paging system or at [phone].           Signed By: Areli Scherer MD

## 2021-05-21 NOTE — ROUTINE PROCESS
Bedside and Verbal shift change report given to [address] Haile (oncoming nurse) by self (offgoing nurse). Report included the following information SBAR, Kardex, ED Summary, Procedure Summary, Intake/Output and Recent Results.

## 2021-05-21 NOTE — PROGRESS NOTES
TRANSFER - OUT REPORT:    Verbal report given to [address] on [address]  being transferred to 308(unit) for routine progression of care       Report consisted of patients Situation, Background, Assessment and   Recommendations(SBAR). Information from the following report(s) Procedure Summary was reviewed with the receiving nurse. Lines:   Peripheral IV 05/20/21 Left Antecubital (Active)   Site Assessment Clean, dry, & intact 05/21/21 0415   Phlebitis Assessment 0 05/21/21 0415   Infiltration Assessment 0 05/21/21 0415   Dressing Status Clean, dry, & intact 05/21/21 0415   Dressing Type Transparent;Tape 05/21/21 0415   Hub Color/Line Status Patent; Flushed 05/21/21 0415   Alcohol Cap Used No 05/21/21 0415        Opportunity for questions and clarification was provided.

## 2021-05-21 NOTE — PROGRESS NOTES
Pt c/o pain when attempting to urinate. Bladder scan completed. 975 mL. Received verbal order from Samantha Fields NP for in & out cath.

## 2021-05-22 VITALS
HEIGHT: 76 IN | HEART RATE: 53 BPM | OXYGEN SATURATION: 98 % | SYSTOLIC BLOOD PRESSURE: 157 MMHG | RESPIRATION RATE: 18 BRPM | TEMPERATURE: 97.6 F | BODY MASS INDEX: 26.31 KG/M2 | DIASTOLIC BLOOD PRESSURE: 109 MMHG | WEIGHT: 216.1 LBS

## 2021-05-22 LAB
ANION GAP SERPL CALC-SCNC: 9 MMOL/L (ref 7–16)
BUN SERPL-MCNC: 21 MG/DL (ref 6–23)
CALCIUM SERPL-MCNC: 9.3 MG/DL (ref 8.3–10.4)
CHLORIDE SERPL-SCNC: 102 MMOL/L (ref 98–107)
CO2 SERPL-SCNC: 26 MMOL/L (ref 21–32)
CREAT SERPL-MCNC: 1.08 MG/DL (ref 0.8–1.5)
ERYTHROCYTE [DISTWIDTH] IN BLOOD BY AUTOMATED COUNT: 14.6 % (ref 11.9–14.6)
GLUCOSE BLD STRIP.AUTO-MCNC: 108 MG/DL (ref 65–100)
GLUCOSE SERPL-MCNC: 114 MG/DL (ref 65–100)
HCT VFR BLD AUTO: 50 % (ref 41.1–50.3)
HGB BLD-MCNC: 17.3 G/DL (ref 13.6–17.2)
MAGNESIUM SERPL-MCNC: 1.9 MG/DL (ref 1.8–2.4)
MCH RBC QN AUTO: 29 PG (ref 26.1–32.9)
MCHC RBC AUTO-ENTMCNC: 34.6 G/DL (ref 31.4–35)
MCV RBC AUTO: 83.9 FL (ref 79.6–97.8)
NRBC # BLD: 0 K/UL (ref 0–0.2)
PLATELET # BLD AUTO: 224 K/UL (ref 150–450)
PMV BLD AUTO: 10.9 FL (ref 9.4–12.3)
POTASSIUM SERPL-SCNC: 3.3 MMOL/L (ref 3.5–5.1)
RBC # BLD AUTO: 5.96 M/UL (ref 4.23–5.6)
SERVICE CMNT-IMP: ABNORMAL
SODIUM SERPL-SCNC: 137 MMOL/L (ref 136–145)
WBC # BLD AUTO: 9.6 K/UL (ref 4.3–11.1)

## 2021-05-22 PROCEDURE — 36415 COLL VENOUS BLD VENIPUNCTURE: CPT

## 2021-05-22 PROCEDURE — 99238 HOSP IP/OBS DSCHRG MGMT 30/<: CPT | Performed by: INTERNAL MEDICINE

## 2021-05-22 PROCEDURE — 82962 GLUCOSE BLOOD TEST: CPT

## 2021-05-22 PROCEDURE — 74011250637 HC RX REV CODE- 250/637: Performed by: NURSE PRACTITIONER

## 2021-05-22 PROCEDURE — 80048 BASIC METABOLIC PNL TOTAL CA: CPT

## 2021-05-22 PROCEDURE — 83735 ASSAY OF MAGNESIUM: CPT

## 2021-05-22 PROCEDURE — 74011250637 HC RX REV CODE- 250/637: Performed by: INTERNAL MEDICINE

## 2021-05-22 PROCEDURE — 85027 COMPLETE CBC AUTOMATED: CPT

## 2021-05-22 RX ORDER — ATORVASTATIN CALCIUM 80 MG/1
80 TABLET, FILM COATED ORAL DAILY
Status: DISCONTINUED | OUTPATIENT
Start: 2021-05-22 | End: 2021-05-22 | Stop reason: HOSPADM

## 2021-05-22 RX ORDER — FUROSEMIDE 40 MG/1
40 TABLET ORAL DAILY
Qty: 30 TABLET | Refills: 6 | Status: SHIPPED | OUTPATIENT
Start: 2021-05-22 | End: 2021-11-16 | Stop reason: SDUPTHER

## 2021-05-22 RX ORDER — ERGOCALCIFEROL 1.25 MG/1
50000 CAPSULE ORAL
Qty: 20 CAPSULE | Refills: 1 | Status: SHIPPED | OUTPATIENT
Start: 2021-05-27 | End: 2021-05-22 | Stop reason: SDUPTHER

## 2021-05-22 RX ORDER — METOPROLOL SUCCINATE 100 MG/1
100 TABLET, EXTENDED RELEASE ORAL 2 TIMES DAILY
Qty: 30 TABLET | Refills: 6 | Status: SHIPPED | OUTPATIENT
Start: 2021-05-22 | End: 2021-10-19 | Stop reason: SDUPTHER

## 2021-05-22 RX ORDER — ERGOCALCIFEROL 1.25 MG/1
50000 CAPSULE ORAL
Qty: 20 CAPSULE | Refills: 1 | Status: SHIPPED | OUTPATIENT
Start: 2021-05-27 | End: 2022-01-21 | Stop reason: SDUPTHER

## 2021-05-22 RX ORDER — SPIRONOLACTONE 25 MG/1
25 TABLET ORAL DAILY
Qty: 30 TABLET | Refills: 6 | Status: SHIPPED | OUTPATIENT
Start: 2021-05-22 | End: 2021-09-08 | Stop reason: SDUPTHER

## 2021-05-22 RX ORDER — FUROSEMIDE 40 MG/1
40 TABLET ORAL DAILY
Status: DISCONTINUED | OUTPATIENT
Start: 2021-05-22 | End: 2021-05-22 | Stop reason: HOSPADM

## 2021-05-22 RX ORDER — FUROSEMIDE 40 MG/1
40 TABLET ORAL DAILY
Qty: 30 TABLET | Refills: 6 | Status: SHIPPED | OUTPATIENT
Start: 2021-05-22 | End: 2021-05-22 | Stop reason: SDUPTHER

## 2021-05-22 RX ORDER — POTASSIUM CHLORIDE 20 MEQ/1
40 TABLET, EXTENDED RELEASE ORAL
Status: COMPLETED | OUTPATIENT
Start: 2021-05-22 | End: 2021-05-22

## 2021-05-22 RX ORDER — METOPROLOL SUCCINATE 100 MG/1
100 TABLET, EXTENDED RELEASE ORAL 2 TIMES DAILY
Status: DISCONTINUED | OUTPATIENT
Start: 2021-05-22 | End: 2021-05-22 | Stop reason: HOSPADM

## 2021-05-22 RX ORDER — POTASSIUM CHLORIDE 20 MEQ/1
20 TABLET, EXTENDED RELEASE ORAL DAILY
Qty: 30 TABLET | Refills: 6 | Status: SHIPPED | OUTPATIENT
Start: 2021-05-22 | End: 2021-11-16 | Stop reason: SDUPTHER

## 2021-05-22 RX ADMIN — FUROSEMIDE 40 MG: 40 TABLET ORAL at 08:06

## 2021-05-22 RX ADMIN — ATORVASTATIN CALCIUM 80 MG: 80 TABLET, FILM COATED ORAL at 08:06

## 2021-05-22 RX ADMIN — Medication 5 ML: at 05:45

## 2021-05-22 RX ADMIN — ASPIRIN 81 MG: 81 TABLET, COATED ORAL at 05:45

## 2021-05-22 RX ADMIN — SPIRONOLACTONE 25 MG: 25 TABLET ORAL at 08:06

## 2021-05-22 RX ADMIN — APIXABAN 5 MG: 5 TABLET, FILM COATED ORAL at 08:06

## 2021-05-22 RX ADMIN — POTASSIUM CHLORIDE 40 MEQ: 20 TABLET, EXTENDED RELEASE ORAL at 08:07

## 2021-05-22 RX ADMIN — METOPROLOL SUCCINATE 100 MG: 100 TABLET, EXTENDED RELEASE ORAL at 08:07

## 2021-05-22 RX ADMIN — SACUBITRIL AND VALSARTAN 1 TABLET: 24; 26 TABLET, FILM COATED ORAL at 08:06

## 2021-05-22 NOTE — PROGRESS NOTES
am  5/22/2021 6:24 AM    Admit Date: 5/20/2021    Admit Diagnosis: Hypertensive urgency [I16.0]; Volume overload [E87.70]      Subjective:    Patient : Doing much better after initiating treatment for heart failure reduced ejection fraction. He has nonobstructive coronary artery disease and atrial fibrillation. He is doing well from standpoint of both of these    Objective:      Visit Vitals  BP (!) 155/103 (BP 1 Location: Right upper arm, BP Patient Position: Sitting)   Pulse 76   Temp 97.5 °F (36.4 °C)   Resp 18   Ht 6' 4\" (1.93 m)   Wt 216 lb 1.6 oz (98 kg)   SpO2 100%   BMI 26.30 kg/m²       ROS:  General ROS: negative for - chills  Hematological and Lymphatic ROS: negative for - blood clots or jaundice  Respiratory ROS: no cough, shortness of breath, or wheezing  Cardiovascular ROS: no chest pain or dyspnea on exertion  Gastrointestinal ROS: no abdominal pain, change in bowel habits, or black or bloody stools  Neurological ROS: no TIA or stroke symptoms    Physical Exam:      Physical Examination: General appearance - Appearance: alert, well appearing, and in no distress.    Neck/lymph - supple, no significant adenopathy  Chest/CV - clear to auscultation, no wheezes, rales or rhonchi, symmetric air entry  Heart - irregularly irregular rhythm  Abdomen/GI - soft, nontender, nondistended, no masses or organomegaly   Musculoskeletal - no joint tenderness, deformity or swelling  Extremities - peripheral pulses normal, no pedal edema, no clubbing or cyanosis  Skin - normal coloration and turgor, no rashes, no suspicious skin lesions noted    Current Facility-Administered Medications   Medication Dose Route Frequency    metoprolol succinate (TOPROL-XL) tablet 100 mg  100 mg Oral BID    atorvastatin (LIPITOR) tablet 80 mg  80 mg Oral DAILY    spironolactone (ALDACTONE) tablet 25 mg  25 mg Oral DAILY    heparin (PF) 2 units/ml in NS infusion  3 mL/hr IntraVENous CONTINUOUS    midazolam (VERSED) injection 0.5-2 mg 0.5-2 mg IntraVENous Multiple    sacubitriL-valsartan (ENTRESTO) 24-26 mg tablet 1 Tablet  1 Tablet Oral Q12H    sodium chloride (NS) flush 5-10 mL  5-10 mL IntraVENous Q8H    sodium chloride (NS) flush 5-10 mL  5-10 mL IntraVENous PRN    insulin lispro (HUMALOG) injection   SubCUTAneous AC&HS    aspirin delayed-release tablet 81 mg  81 mg Oral 7am    sodium chloride (NS) flush 5-40 mL  5-40 mL IntraVENous Q8H    sodium chloride (NS) flush 5-40 mL  5-40 mL IntraVENous PRN    nitroglycerin (NITROSTAT) tablet 0.4 mg  0.4 mg SubLINGual Q5MIN PRN    morphine injection 2 mg  2 mg IntraVENous Q4H PRN    apixaban (ELIQUIS) tablet 5 mg  5 mg Oral BID    furosemide (LASIX) injection 60 mg  60 mg IntraVENous BID    hydrALAZINE (APRESOLINE) 20 mg/mL injection 20 mg  20 mg IntraVENous Q6H PRN    ergocalciferol capsule 50,000 Units  50,000 Units Oral Q7D    HYDROcodone-acetaminophen (NORCO) 5-325 mg per tablet 1 Tablet  1 Tablet Oral Q6H PRN       Data Review: data included in this note has been independently reviewed by the author   CMP:   Lab Results   Component Value Date/Time     05/22/2021 03:59 AM    K 3.3 (L) 05/22/2021 03:59 AM     05/22/2021 03:59 AM    CO2 26 05/22/2021 03:59 AM    AGAP 9 05/22/2021 03:59 AM     (H) 05/22/2021 03:59 AM    BUN 21 05/22/2021 03:59 AM    CREA 1.08 05/22/2021 03:59 AM    GFRAA >60 05/22/2021 03:59 AM    GFRNA >60 05/22/2021 03:59 AM    CA 9.3 05/22/2021 03:59 AM    MG 1.9 05/22/2021 03:59 AM     CBC:   Lab Results   Component Value Date/Time    WBC 9.6 05/22/2021 03:59 AM    HGB 17.3 (H) 05/22/2021 03:59 AM    HCT 50.0 05/22/2021 03:59 AM     05/22/2021 03:59 AM        TELEMETRY: af    Assessment/Plan:     Principal Problem:    Hypertensive urgency (5/20/2021)  Blood pressure much better controlled we will titrate Toprol to 100 mg p.o. twice daily    Active Problems:    HLD (hyperlipidemia) (5/14/2015)  Increase Lipitor to 80 daily      Vitamin D deficiency (5/14/2015)  Recommend outpatient vitamin D supplement      Volume overload (5/20/2021)  Is now euvolemic and will continue with Lasix as outpatient      Diabetes mellitus type 2, controlled ([facility] (5/20/2021)  Needs to establish primary care to follow blood sugars.   Will consider [facility] Manzano as outpatient      Atrial fibrillation ([facility] (5/20/2021)  We will follow a rate control anticoagulation paradigm at this point       Anticipate discharge home today      Segun Guzman MD

## 2021-05-22 NOTE — DISCHARGE SUMMARY
[facility] Cardiology Discharge Summary     Patient ID:  Sb Garnett  [id number]  33 y.o.  1960    Admit date: 5/20/2021    Discharge date:  5/22/2021    Admitting Physician: Maranda Hernández MD     Discharge Physician: Dr. Roe Morrow    Admission Diagnoses: Hypertensive urgency [I16.0]  Volume overload [E87.70]    Discharge Diagnoses:    Diagnosis    Volume overload    Hypertensive urgency    Diabetes mellitus type 2, controlled     Atrial fibrillation     Essential hypertension    HLD (hyperlipidemia)    Vitamin D deficiency    Depression    Chronic back pain       Cardiology Procedures this admission:  Diagnostic left heart catheterization  CT angiogram of chest  EchoCardiogram  Consults: None    Hospital Course: Patient presented to the ED with c/o SOB and orthopnea. In the ED he was found to be severely hypertensive. He had CT head that was negative. Elevated DDimer but CTA chest negative for PE. He was given IVP Lasix with good response. He was admitted for further w/u and management. He was subsequently scheduled for a [facility] on 5/21/21. Patient underwent cardiac catheterization by Dr. Roe Morrwo. Patient was found to have nonischemic dilated CM with EF 30-35%, diffuse but non-obstructive CAD. Patient tolerated the procedure well and was taken to the telemetry floor for recovery and optimized on medical therapy. ECHO showed:  -  Left ventricle: The ventricle was moderately dilated. Systolic function   Was moderately globally reduced. Ejection fraction was estimated in the range of   30 % to 35 %. Wall thickness was mildly increased. -  Left atrium: The atrium was moderately to markedly dilated. -  Mitral valve: There was moderate regurgitation. On the AM of discharge,  patient was up feeling well without any complaints of chest pain or shortness of breath. Patient's right radial cath site was clean, dry and intact without hematoma or bruit.  Patient's labs were stable. Patient was seen and examined by Dr. Jennie Cooln and determined stable and ready for discharge. Patient was instructed on the importance of medication compliance and follow-up. For maximized medical therapy for his NICM, patient will continue BB, ARNI, Aldactone. He will continue Eliquis for his AFib as well as ASA and statin for his diffuse CAD. Patient discharged on lasix daily and will have BMP prior to discharge. The patient will follow up with [facility] and has been referred to cardiac rehab. DISPOSITION: The patient is being discharged home in stable condition on a low saturated fat, low cholesterol and low salt diet. The patient is instructed to advance activities as tolerated to the limit of fatigue or shortness of breath. The patient is instructed to avoid all heavy lifting, straining, stooping or squatting for 3-5 days. The patient is instructed to watch the cath site for bleeding/oozing; if seen, the patient is instructed to apply firm pressure with a clean cloth and call [facility] at [phone]. The patient is instructed to watch for signs of infection which include: increasing area of redness, fever/hot to touch or purulent drainage at the catheterization site. The patient is instructed not to soak in a bathtub for 7-10 days, but is cleared to shower. The patient is instructed to call the office or return to the ER for immediate evaluation for any shortness of breath or chest pain not relieved by NTG. Discharge Exam:   Visit Vitals  BP (!) 148/93 (BP 1 Location: Right upper arm, BP Patient Position: Sitting)   Pulse 76   Temp 97.5 °F (36.4 °C)   Resp 18   Ht 6' 4\" (1.93 m)   Wt 98 kg (216 lb 1.6 oz)   SpO2 100%   BMI 26.30 kg/m²       Patient has been seen by Dr. Jennie Colon: see his progress note for exam details.     Recent Results (from the past 24 hour(s))   LIPID PANEL    Collection Time: 05/21/21  5:10 AM   Result Value Ref Range    Cholesterol, total 116 <200 MG/DL Triglyceride 90 35 - 150 MG/DL    HDL Cholesterol 24 (L) 40 - 60 MG/DL    LDL, calculated 74 <100 MG/DL    VLDL, calculated 18 6.0 - 23.0 MG/DL    CHOL/HDL Ratio 4.8     MAGNESIUM    Collection Time: 05/21/21  5:10 AM   Result Value Ref Range    Magnesium 2.0 1.8 - 2.4 mg/dL   CBC W/O DIFF    Collection Time: 05/21/21  5:10 AM   Result Value Ref Range    WBC 7.9 4.3 - 11.1 K/uL    RBC 5.20 4.23 - 5.6 M/uL    HGB 14.9 13.6 - 17.2 g/dL    HCT 45.3 41.1 - 50.3 %    MCV 87.1 79.6 - 97.8 FL    MCH 28.7 26.1 - 32.9 PG    MCHC 32.9 31.4 - 35.0 g/dL    RDW 14.6 11.9 - 14.6 %    PLATELET 705 684 - 253 K/uL    MPV 10.8 9.4 - 12.3 FL    ABSOLUTE NRBC 0.00 0.0 - 0.2 K/uL   METABOLIC PANEL, BASIC    Collection Time: 05/21/21  5:10 AM   Result Value Ref Range    Sodium 140 136 - 145 mmol/L    Potassium 3.6 3.5 - 5.1 mmol/L    Chloride 105 98 - 107 mmol/L    CO2 30 21 - 32 mmol/L    Anion gap 5 (L) 7 - 16 mmol/L    Glucose 105 (H) 65 - 100 mg/dL    BUN 22 6 - 23 MG/DL    Creatinine 1.08 0.8 - 1.5 MG/DL    GFR est AA >60 >60 ml/min/1.73m2    GFR est non-AA >60 >60 ml/min/1.73m2    Calcium 8.9 8.3 - 10.4 MG/DL   GLUCOSE, POC    Collection Time: 05/21/21  7:25 AM   Result Value Ref Range    Glucose (POC) 113 (H) 65 - 100 mg/dL    Performed by Carola    GLUCOSE, POC    Collection Time: 05/21/21 11:03 AM   Result Value Ref Range    Glucose (POC) 148 (H) 65 - 100 mg/dL    Performed by Xiang    GLUCOSE, POC    Collection Time: 05/21/21  3:55 PM   Result Value Ref Range    Glucose (POC) 123 (H) 65 - 100 mg/dL    Performed by Srinivas    GLUCOSE, POC    Collection Time: 05/21/21  8:57 PM   Result Value Ref Range    Glucose (POC) 111 (H) 65 - 100 mg/dL    Performed by Anne Marie          Patient Instructions:       Current Discharge Medication List      START taking these medications    Details   sacubitril-valsartan (ENTRESTO) 24 mg/26 mg tablet Take 1 Tablet by mouth every twelve (12) hours.   Qty: 60 Tablet, Refills: 6  Start date: 5/22/2021      metoprolol succinate (TOPROL-XL) 100 mg tablet Take 1 Tablet by mouth two (2) times a day. Qty: 30 Tablet, Refills: 6  Start date: 5/22/2021      apixaban (ELIQUIS) 5 mg tablet Take 1 Tablet by mouth two (2) times a day. Qty: 60 Tablet, Refills: 6  Start date: 5/22/2021      furosemide (LASIX) 40 mg tablet Take 1 Tablet by mouth daily. Qty: 30 Tablet, Refills: 6  Start date: 5/22/2021      ergocalciferol (ERGOCALCIFEROL) 1,250 mcg (50,000 unit) capsule Take 1 Capsule by mouth every seven (7) days. Qty: 20 Capsule, Refills: 1  Start date: 5/27/2021      spironolactone (ALDACTONE) 25 mg tablet Take 1 Tablet by mouth daily. Qty: 30 Tablet, Refills: 6  Start date: 5/22/2021      potassium chloride (K-DUR, KLOR-CON) 20 mEq tablet Take 1 Tablet by mouth daily. Qty: 30 Tablet, Refills: 6  Start date: 5/22/2021         CONTINUE these medications which have NOT CHANGED    Details   meloxicam (MOBIC) 7.5 mg tablet Take 7.5 mg by mouth two (2) times a day. aspirin delayed-release 81 mg tablet Take 81 mg by mouth every morning. metFORMIN (GLUCOPHAGE) 500 mg tablet Take 500 mg by mouth daily (with breakfast). atorvastatin (LIPITOR) 20 mg tablet Take 1 Tab by mouth daily.   Qty: 90 Tab, Refills: 1    Associated Diagnoses: HLD (hyperlipidemia)         STOP taking these medications       lisinopriL (PRINIVIL, ZESTRIL) 10 mg tablet Comments:   Reason for Stopping:

## 2021-05-22 NOTE — ROUTINE PROCESS
Bedside and Verbal shift change report received from [address]. Report included the following information SBAR, Kardex, Intake/Output, MAR and Recent Results.

## 2021-05-22 NOTE — ROUTINE PROCESS
Bedside and Verbal shift change report given to self (oncoming nurse) by Ashtyn Clarke (offgoing nurse). Report included the following information SBAR, Kardex, Intake/Output, MAR and Recent Results.

## 2021-05-22 NOTE — PROGRESS NOTES
Discharge instructions reviewed with pt. Prescriptions given and med info sheets provided for all new medications. Opportunity for questions provided. Pt voiced understanding of all discharge instructions. IV and heart monitor removed.

## 2021-05-22 NOTE — PROGRESS NOTES
Problem: Falls - Risk of  Goal: *Absence of Falls  Description: Document Rafat Harper Fall Risk and appropriate interventions in the flowsheet.   Outcome: Progressing Towards Goal  Note: Fall Risk Interventions:  Mobility Interventions: Communicate number of staff needed for ambulation/transfer, Patient to call before getting OOB         Medication Interventions: Assess postural VS orthostatic hypotension, Evaluate medications/consider consulting pharmacy, Patient to call before getting OOB, Teach patient to arise slowly

## 2021-05-22 NOTE — ROUTINE PROCESS
Bedside and Verbal shift change report to be given to Ghassan Kent RN (oncoming nurse) by self (offgoing nurse). Report included the following information SBAR, Kardex, Intake/Output, MAR and Recent Results.

## 2021-05-22 NOTE — PROGRESS NOTES
Around 0130, patient got dressed in his clothes and shoes and took off his tele box. Went into room and patient stated that he was just wanting to walk around for a bit. Tele box placed back on patient and patient educated on why it needed to stay on. Patient then proceeded to get onto elevator. Staff explained that he was not allowed to go downstairs with tele box on and that if he wanted to leave AMA he could sign the paper and go. Patient got off elevator and went back to his room, stating that he would stay here until he was discharged in the morning.

## 2021-05-22 NOTE — DISCHARGE INSTRUCTIONS
NEXT DOSE ON MONDAY MORNING/ MAY 30      The patient is being discharged home in stable condition on a low saturated fat, low cholesterol and low salt diet. The patient is instructed to advance activities as tolerated to the limit of fatigue or shortness of breath. The patient is instructed to avoid all heavy lifting, straining, stooping or squatting for 3-5 days. The patient is instructed to watch the cath site for bleeding/oozing; if seen, the patient is instructed to apply firm pressure with a clean cloth and call [facility] at [phone]. The patient is instructed to watch for signs of infection which include: increasing area of redness, fever/hot to touch or purulent drainage at the catheterization site. The patient is instructed not to soak in a bathtub for 7-10 days, but is cleared to shower. The patient is instructed to call the office or return to the ER for immediate evaluation for any shortness of breath or chest pain not relieved by NTG.    Patient Education        Heart Failure: Care Instructions  Your Care Instructions     Heart failure occurs when your heart does not pump as much blood as the body needs. Failure does not mean that the heart has stopped pumping but rather that it is not pumping as well as it should. Over time, this causes fluid buildup in your lungs and other parts of your body. Fluid buildup can cause shortness of breath, fatigue, swollen ankles, and other problems. By taking medicines regularly, reducing sodium (salt) in your diet, checking your weight every day, and making lifestyle changes, you can feel better and live longer. Follow-up care is a key part of your treatment and safety. Be sure to make and go to all appointments, and call your doctor if you are having problems. It's also a good idea to know your test results and keep a list of the medicines you take. How can you care for yourself at home? Medicines    · Be safe with medicines.  Take your medicines exactly as prescribed. Call your doctor if you think you are having a problem with your medicine.     · Do not take any vitamins, over-the-counter medicine, or herbal products without talking to your doctor first. Jenna Ruiz not take ibuprofen (Advil or Motrin) and naproxen (Aleve) without talking to your doctor first. They could make your heart failure worse.     · You may take some of the following medicine. ? Angiotensin-converting enzyme inhibitors (ACEIs) or angiotensin II receptor blockers (ARBs) reduce the heart's workload, lower blood pressure, and reduce swelling. Taking an ACEI or ARB may lower your chance of needing to be hospitalized. ? Beta-blockers can slow heart rate, decrease blood pressure, and improve your condition. Taking a beta-blocker may lower your chance of needing to be hospitalized. ? Diuretics, also called water pills, reduce swelling. You will get more details on the specific medicines your doctor prescribes. Diet    · Your doctor may suggest that you limit sodium. Your doctor can tell you how much sodium is right for you. An example is less than 3,000 mg a day. This includes all the salt you eat in cooking or in packaged foods. People get most of their sodium from processed foods. Fast food and restaurant meals also tend to be very high in sodium.     · Ask your doctor how much liquid you can drink each day. You may have to limit liquids. Weight    · Weigh yourself without clothing at the same time each day. Record your weight. Call your doctor if you have a sudden weight gain, such as more than 2 to 3 pounds in a day or 5 pounds in a week. (Your doctor may suggest a different range of weight gain.) A sudden weight gain may mean that your heart failure is getting worse. Activity level    · Start light exercise (if your doctor says it is okay). Even if you can only do a small amount, exercise will help you get stronger, have more energy, and manage your weight and your stress.  Walking is an easy way to get exercise. Start out by walking a little more than you did before. Bit by bit, increase the amount you walk.     · When you exercise, watch for signs that your heart is working too hard. You are pushing yourself too hard if you cannot talk while you are exercising. If you become short of breath or dizzy or have chest pain, stop, sit down, and rest.     · If you feel \"wiped out\" the day after you exercise, walk slower or for a shorter distance until you can work up to a better pace.     · Get enough rest at night. Sleeping with 1 or 2 pillows under your upper body and head may help you breathe easier. Lifestyle changes    · Do not smoke. Smoking can make a heart condition worse. If you need help quitting, talk to your doctor about stop-smoking programs and medicines. These can increase your chances of quitting for good. Quitting smoking may be the most important step you can take to protect your heart.     · Limit alcohol to 2 drinks a day for men and 1 drink a day for women. Too much alcohol can cause health problems.     · Avoid getting sick from colds and the flu. Get a pneumococcal vaccine shot. If you have had one before, ask your doctor whether you need another dose. Get a flu shot each year. If you must be around people with colds or the flu, wash your hands often. When should you call for help? Call 911 if you have symptoms of sudden heart failure such as:    · You have severe trouble breathing.     · You cough up pink, foamy mucus.     · You have a new irregular or rapid heartbeat. Call your doctor now or seek immediate medical care if:    · You have new or increased shortness of breath.     · You are dizzy or lightheaded, or you feel like you may faint.     · You have sudden weight gain, such as more than 2 to 3 pounds in a day or 5 pounds in a week.  (Your doctor may suggest a different range of weight gain.)     · You have increased swelling in your legs, ankles, or feet.     · You are suddenly so tired or weak that you cannot do your usual activities. Watch closely for changes in your health, and be sure to contact your doctor if you develop new symptoms. Where can you learn more? Go to http://www.gray.com/  Enter E153 in the search box to learn more about \"Heart Failure: Care Instructions. \"  Current as of: August 31, 2020               Content Version: 12.8  © 2006-2021 [employer]. Care instructions adapted under license by [employer] (which disclaims liability or warranty for this information). If you have questions about a medical condition or this instruction, always ask your healthcare professional. [place] [address] any warranty or liability for your use of this information. Patient Education      Avis Melara - (By mouth)   Why this medicine is used:   Treats chronic heart failure. Contact a nurse or doctor right away if you have:  · Itching or hives, swelling in your face or hands, swelling or tingling in your mouth or throat, chest tightness, trouble breathing  · Uneven heartbeat, trouble breathing  · Confusion, weakness, numbness in your hands, feet, or lips  · Decrease in how much or how often you urinate, bloody or cloudy urine  · Lightheadedness, dizziness, or fainting  · Lower back or side pain   © 2017 [address] is for End User's use only and may not be sold, redistributed or otherwise used for commercial purposes. Patient Education      Apixaban (Eliquis) - (By mouth)   Why this medicine is used:   Treats and prevents blood clots.   Contact a nurse or doctor right away if you have:  · Sudden or severe headache  · Back pain, numbness, tingling, weakness in your legs or feet  · Loss of bladder or bowel control  · Bloody vomit or vomit that looks like coffee grounds; bloody or black, tarry stools  · Bleeding that does not stop or bruises that do not heal Common side effects:  · Minor bleeding or bruising  © 2017 [employer] Information is for End User's use only and may not be sold, redistributed or otherwise used for commercial purposes. Patient Education      Spironolactone (Aldactone, Carospir) - (By mouth)   Why this medicine is used:   Treats high blood pressure, edema (fluid retention), or high levels of aldosterone (a hormone). Contact a nurse or doctor right away if you have:  · Blistering, peeling, red skin rash  · Blood in your stools or dark stools, vomiting blood or material that looks like coffee grounds  · Confusion, weakness, uneven heartbeat, trouble breathing, numbness or tingling  · Dry mouth, increased thirst, muscle cramps, nausea, vomiting  · Increased hunger or thirst, change in how much or how often you urinate  · Lightheadedness, dizziness, drowsiness, fainting, muscle twitching  · Unusual bleeding, bruising, or weakness     Common side effects:  · Breast swelling, enlargement, pain, or tenderness  © 2017 [employer] Information is for End User's use only and may not be sold, redistributed or otherwise used for commercial purposes. Patient Education      Metoprolol (Lopressor, Toprol XL) - (By mouth)   Why this medicine is used:   Treats high blood pressure, chest pain, and heart failure. Contact a nurse or doctor right away if you have:  · Lightheadedness, dizziness, fainting  · Rapid weight gain; swelling in your hands, ankles, or feet     Common side effects:  · Mild dizziness  · Tiredness  © 2017 [employer] Information is for End User's use only and may not be sold, redistributed or otherwise used for commercial purposes. Patient Education      Potassium Chloride (K-Dur, K-Sol, K-Tab, Dane Mur) - (By mouth)   Why this medicine is used:   Prevents and treats low potassium levels in the blood.   Contact a nurse or doctor right away if you have:  · Uneven heartbeat, trouble breathing  · Confusion, weakness, numbness in your hands, feet, or lips  · Bloody or black, tarry stools     Common side effects:  · Mild nausea, diarrhea, gas  © 2017 [facility] Information is for End User's use only and may not be sold, redistributed or otherwise used for commercial purposes. Patient Education   Furosemide (By mouth)   Furosemide (leqy-MT-cu-mide)  Treats fluid retention (edema) and high blood pressure. This medicine is a diuretic (water pill). Brand Name(s): Active-Medicated Specimen Collection Kit, Diuscreen Multi-Drug Medicated Test Kit, Lasix, RX-Specimen Collection Kit, Specimen Collection Kit   There may be other brand names for this medicine. When This Medicine Should Not Be Used: This medicine is not right for everyone. Do not use it if you had an allergic reaction to furosemide. How to Use This Medicine:   Liquid, Tablet  · Take your medicine as directed. Your dose may need to be changed several times to find what works best for you. · You may take this medicine with food if it upsets your stomach. · Oral liquid: Measure the oral liquid medicine with a marked measuring spoon, oral syringe, or medicine cup. · Tablet: Swallow the tablet whole. Do not crush, break, or chew it. · Missed dose: Take a dose as soon as you remember. If it is almost time for your next dose, wait until then and take a regular dose. Do not take extra medicine to make up for a missed dose. · Store the medicine in a closed container at room temperature, away from heat, moisture, and direct light. Drugs and Foods to Avoid:   Ask your doctor or pharmacist before using any other medicine, including over-the-counter medicines, vitamins, and herbal products. · Some medicines can affect how furosemide works.  Tell your doctor if you are also using any of the following:  ¨ Cisplatin, cyclosporine, digoxin, ethacrynic acid, licorice, lithium, methotrexate, or phenytoin  ¨ Adrenocorticotropic hormone (ACTH)  ¨ Laxative  ¨ Medicine to treat an infection  ¨ NSAID pain or arthritis medicine (including aspirin, diclofenac, ibuprofen, indomethacin, naproxen)  ¨ Other blood pressure medicines  ¨ Steroid medicine (including dexamethasone, hydrocortisone, methylprednisolone, prednisolone, prednisone)  ¨ Thyroid medicine  · If you also take sucralfate, allow at least 2 hours between the time you take furosemide and the time you take sucralfate. · Alcohol, narcotic pain medicine, or sleeping pills may cause you to feel more lightheaded, dizzy, or faint when used with this medicine. Warnings While Using This Medicine:   · Tell your doctor if you are pregnant or breastfeeding, or if you have kidney disease, liver disease (including cirrhosis), diabetes, gout, low blood pressure, lupus, an enlarged prostate, trouble urinating, or an allergy to sulfa drugs. Tell your doctor if you are on a low-salt diet. · This medicine may cause the following problems:   ¨ Low levels of minerals in your blood, such as potassium and sodium  ¨ Blood sugar level changes  ¨ Hearing problems  · Make sure any doctor or dentist who treats you knows that you are using this medicine. · This medicine could lower your blood pressure too much, especially when you first use it or if you are dehydrated. Stand or sit up slowly if you feel lightheaded or dizzy. · This medicine may make your skin more sensitive to sunlight. Wear sunscreen. Do not use sunlamps or tanning beds. · Your doctor will do lab tests at regular visits to check on the effects of this medicine. Keep all appointments. · Keep all medicine out of the reach of children. Never share your medicine with anyone.   Possible Side Effects While Using This Medicine:   Call your doctor right away if you notice any of these side effects:  · Allergic reaction: Itching or hives, swelling in your face or hands, swelling or tingling in your mouth or throat, chest tightness, trouble breathing  · Blistering, peeling, red skin rash  · Confusion, weakness, muscle twitching  · Dry mouth, increased thirst, muscle cramps, uneven heartbeat  · Sudden and severe stomach pain, nausea, vomiting, fever, lightheadedness  · Hearing loss, ringing in the ears  · Lightheadedness, dizziness, fainting  · Severe diarrhea  · Unusual bleeding or bruising  · Yellow skin or eyes  If you notice these less serious side effects, talk with your doctor:   · Loss of appetite, stomach cramps  If you notice other side effects that you think are caused by this medicine, tell your doctor. Call your doctor for medical advice about side effects. You may report side effects to FDA at 9-457-FDA-7075  © 2017 [address]  Information is for End User's use only and may not be sold, redistributed or otherwise used for commercial purposes. The above information is an  only. It is not intended as medical advice for individual conditions or treatments. Talk to your doctor, nurse or pharmacist before following any medical regimen to see if it is safe and effective for you. Cardiology

## 2021-06-22 ENCOUNTER — HOSPITAL ENCOUNTER (OUTPATIENT)
Dept: LAB | Age: 61
Discharge: HOME | End: 2021-06-22
Payer: MEDICARE

## 2021-06-22 DIAGNOSIS — I10 UNCONTROLLED HYPERTENSION: ICD-10-CM

## 2021-06-22 DIAGNOSIS — I50.22 SYSTOLIC CHF, CHRONIC (HCC): ICD-10-CM

## 2021-06-22 DIAGNOSIS — I48.11 LONGSTANDING PERSISTENT ATRIAL FIBRILLATION (HCC): ICD-10-CM

## 2021-06-22 LAB
ANION GAP SERPL CALC-SCNC: 3 MMOL/L (ref 7–16)
BUN SERPL-MCNC: 20 MG/DL (ref 6–23)
CALCIUM SERPL-MCNC: 9.4 MG/DL (ref 8.3–10.4)
CHLORIDE SERPL-SCNC: 109 MMOL/L (ref 98–107)
CO2 SERPL-SCNC: 29 MMOL/L (ref 21–32)
CREAT SERPL-MCNC: 1.3 MG/DL (ref 0.8–1.5)
GLUCOSE SERPL-MCNC: 119 MG/DL (ref 65–100)
POTASSIUM SERPL-SCNC: 4.1 MMOL/L (ref 3.5–5.1)
SODIUM SERPL-SCNC: 141 MMOL/L (ref 138–145)

## 2021-06-22 PROCEDURE — 80048 BASIC METABOLIC PNL TOTAL CA: CPT

## 2021-06-22 PROCEDURE — 36415 COLL VENOUS BLD VENIPUNCTURE: CPT

## 2021-09-21 ENCOUNTER — HOSPITAL ENCOUNTER (OUTPATIENT)
Dept: LAB | Age: 61
Discharge: HOME | End: 2021-09-21
Payer: MEDICARE

## 2021-09-21 DIAGNOSIS — I50.22 CHRONIC SYSTOLIC HEART FAILURE (HCC): ICD-10-CM

## 2021-09-21 LAB
ANION GAP SERPL CALC-SCNC: 4 MMOL/L (ref 7–16)
BUN SERPL-MCNC: 15 MG/DL (ref 6–23)
CALCIUM SERPL-MCNC: 9 MG/DL (ref 8.3–10.4)
CHLORIDE SERPL-SCNC: 107 MMOL/L (ref 98–107)
CO2 SERPL-SCNC: 30 MMOL/L (ref 21–32)
CREAT SERPL-MCNC: 1.34 MG/DL (ref 0.8–1.5)
GLUCOSE SERPL-MCNC: 130 MG/DL (ref 65–100)
POTASSIUM SERPL-SCNC: 3.9 MMOL/L (ref 3.5–5.1)
SODIUM SERPL-SCNC: 141 MMOL/L (ref 138–145)

## 2021-09-21 PROCEDURE — 36415 COLL VENOUS BLD VENIPUNCTURE: CPT

## 2021-09-21 PROCEDURE — 80048 BASIC METABOLIC PNL TOTAL CA: CPT

## 2021-10-21 ENCOUNTER — HOSPITAL ENCOUNTER (OUTPATIENT)
Dept: LAB | Age: 61
Discharge: HOME | End: 2021-10-21
Payer: MEDICARE

## 2021-10-21 DIAGNOSIS — I50.22 CHRONIC HFREF (HEART FAILURE WITH REDUCED EJECTION FRACTION) (HCC): ICD-10-CM

## 2021-10-21 LAB
ANION GAP SERPL CALC-SCNC: 3 MMOL/L (ref 7–16)
BUN SERPL-MCNC: 15 MG/DL (ref 6–23)
CALCIUM SERPL-MCNC: 9.2 MG/DL (ref 8.3–10.4)
CHLORIDE SERPL-SCNC: 107 MMOL/L (ref 98–107)
CO2 SERPL-SCNC: 28 MMOL/L (ref 21–32)
CREAT SERPL-MCNC: 1.36 MG/DL (ref 0.8–1.5)
GLUCOSE SERPL-MCNC: 95 MG/DL (ref 65–100)
POTASSIUM SERPL-SCNC: 4 MMOL/L (ref 3.5–5.1)
SODIUM SERPL-SCNC: 138 MMOL/L (ref 138–145)

## 2021-10-21 PROCEDURE — 80048 BASIC METABOLIC PNL TOTAL CA: CPT

## 2021-10-21 PROCEDURE — 36415 COLL VENOUS BLD VENIPUNCTURE: CPT

## 2021-11-18 ENCOUNTER — HOSPITAL ENCOUNTER (OUTPATIENT)
Dept: LAB | Age: 61
Discharge: HOME | End: 2021-11-18
Payer: MEDICARE

## 2021-11-18 DIAGNOSIS — I50.22 CHRONIC HFREF (HEART FAILURE WITH REDUCED EJECTION FRACTION) (HCC): ICD-10-CM

## 2021-11-18 LAB
ANION GAP SERPL CALC-SCNC: 7 MMOL/L (ref 7–16)
BUN SERPL-MCNC: 17 MG/DL (ref 6–23)
CALCIUM SERPL-MCNC: 9.2 MG/DL (ref 8.3–10.4)
CHLORIDE SERPL-SCNC: 105 MMOL/L (ref 98–107)
CO2 SERPL-SCNC: 27 MMOL/L (ref 21–32)
CREAT SERPL-MCNC: 1.36 MG/DL (ref 0.8–1.5)
GLUCOSE SERPL-MCNC: 133 MG/DL (ref 65–100)
POTASSIUM SERPL-SCNC: 4.2 MMOL/L (ref 3.5–5.1)
SODIUM SERPL-SCNC: 139 MMOL/L (ref 138–145)

## 2021-11-18 PROCEDURE — 80048 BASIC METABOLIC PNL TOTAL CA: CPT

## 2021-11-18 PROCEDURE — 36415 COLL VENOUS BLD VENIPUNCTURE: CPT

## 2022-03-18 PROBLEM — I50.22 CHRONIC HFREF (HEART FAILURE WITH REDUCED EJECTION FRACTION) (HCC): Status: ACTIVE | Noted: 2021-11-18

## 2022-03-18 PROBLEM — M48.00 SPINAL STENOSIS: Status: ACTIVE | Noted: 2017-10-09

## 2022-03-19 PROBLEM — M48.061 SPINAL STENOSIS OF LUMBAR REGION: Status: ACTIVE | Noted: 2017-10-09

## 2022-03-19 PROBLEM — E11.9 DIABETES MELLITUS TYPE 2, CONTROLLED (HCC): Status: ACTIVE | Noted: 2021-05-20

## 2022-03-19 PROBLEM — M43.16 SPONDYLOLISTHESIS OF LUMBAR REGION: Status: ACTIVE | Noted: 2017-10-09

## 2022-03-19 PROBLEM — E87.70 VOLUME OVERLOAD: Status: ACTIVE | Noted: 2021-05-20

## 2022-03-19 PROBLEM — I16.0 HYPERTENSIVE URGENCY: Status: ACTIVE | Noted: 2021-05-20

## 2022-03-20 PROBLEM — I48.91 ATRIAL FIBRILLATION (HCC): Status: ACTIVE | Noted: 2021-05-20

## 2022-04-27 NOTE — PERIOP NOTES
Corrected Serial Number in intraop log for 10/9/17 for tissue tracking. Detail Level: Detailed Additional Notes: Patient is unaware of medication dosages and strengths Quality 265: Biopsy Follow-Up: Biopsy results reviewed, communicated, tracked, and documented Quality 130: Documentation Of Current Medications In The Medical Record: Documentation of a medical reason(s) for not documenting, updating, or reviewing the patientâs current medications list (e.g., patient is in an urgent or emergent medical situation)

## 2022-06-01 ENCOUNTER — TELEPHONE (OUTPATIENT)
Dept: CARDIOLOGY CLINIC | Age: 62
End: 2022-06-01

## 2022-06-01 RX ORDER — SACUBITRIL AND VALSARTAN 97; 103 MG/1; MG/1
1 TABLET, FILM COATED ORAL 2 TIMES DAILY
Qty: 60 TABLET | Refills: 11 | Status: SHIPPED | OUTPATIENT
Start: 2022-06-01

## 2022-06-01 NOTE — TELEPHONE ENCOUNTER
Requested Prescriptions     Signed Prescriptions Disp Refills    sacubitril-valsartan (ENTRESTO)  MG per tablet 60 tablet 11     Sig: Take 1 tablet by mouth 2 times daily     Authorizing Provider: Brooklyn WILKERSON     Ordering User: Hoang Curran

## 2022-06-01 NOTE — TELEPHONE ENCOUNTER
Needs Rx fr Entresto  mg called in to [facility] [phone].  Message left on voice mail

## 2022-07-19 ENCOUNTER — HOSPITAL ENCOUNTER (EMERGENCY)
Age: 62
Discharge: HOME | End: 2022-07-19
Attending: EMERGENCY MEDICINE
Payer: MEDICARE

## 2022-07-19 ENCOUNTER — HOSPITAL ENCOUNTER (EMERGENCY)
Dept: GENERAL RADIOLOGY | Age: 62
Discharge: HOME | End: 2022-07-22
Payer: MEDICARE

## 2022-07-19 VITALS
OXYGEN SATURATION: 94 % | WEIGHT: 180 LBS | DIASTOLIC BLOOD PRESSURE: 108 MMHG | RESPIRATION RATE: 12 BRPM | HEIGHT: 76 IN | HEART RATE: 73 BPM | TEMPERATURE: 99.6 F | SYSTOLIC BLOOD PRESSURE: 158 MMHG | BODY MASS INDEX: 21.92 KG/M2

## 2022-07-19 DIAGNOSIS — U07.1 COVID-19 VIRUS INFECTION: Primary | ICD-10-CM

## 2022-07-19 LAB
ALBUMIN SERPL-MCNC: 3.9 G/DL (ref 3.2–4.6)
ALBUMIN/GLOB SERPL: 1.1 {RATIO} (ref 1.2–3.5)
ALP SERPL-CCNC: 60 U/L (ref 50–136)
ALT SERPL-CCNC: 35 U/L (ref 12–65)
ANION GAP SERPL CALC-SCNC: 8 MMOL/L (ref 7–16)
APPEARANCE UR: CLEAR
AST SERPL-CCNC: 39 U/L (ref 15–37)
BACTERIA URNS QL MICRO: 0 /HPF
BASOPHILS # BLD: 0 K/UL (ref 0–0.2)
BASOPHILS NFR BLD: 1 % (ref 0–2)
BILIRUB SERPL-MCNC: 0.4 MG/DL (ref 0.2–1.1)
BILIRUB UR QL: NEGATIVE
BILIRUB UR QL: NEGATIVE
BUN SERPL-MCNC: 21 MG/DL (ref 8–23)
CALCIUM SERPL-MCNC: 8.4 MG/DL (ref 8.3–10.4)
CASTS URNS QL MICRO: 0 /LPF
CHLORIDE SERPL-SCNC: 105 MMOL/L (ref 98–107)
CO2 SERPL-SCNC: 24 MMOL/L (ref 21–32)
COLOR UR: ABNORMAL
CREAT SERPL-MCNC: 1.5 MG/DL (ref 0.8–1.5)
CRYSTALS URNS QL MICRO: 0 /LPF
DIFFERENTIAL METHOD BLD: ABNORMAL
EKG DIAGNOSIS: NORMAL
EKG Q-T INTERVAL: 402 MS
EKG QRS DURATION: 88 MS
EKG QTC CALCULATION (BAZETT): 424 MS
EKG R AXIS: 72 DEGREES
EKG T AXIS: 158 DEGREES
EKG VENTRICULAR RATE: 67 BPM
EOSINOPHIL # BLD: 0 K/UL (ref 0–0.8)
EOSINOPHIL NFR BLD: 0 % (ref 0.5–7.8)
EPI CELLS #/AREA URNS HPF: NORMAL /HPF
ERYTHROCYTE [DISTWIDTH] IN BLOOD BY AUTOMATED COUNT: 14.3 % (ref 11.9–14.6)
GLOBULIN SER CALC-MCNC: 3.4 G/DL (ref 2.3–3.5)
GLUCOSE SERPL-MCNC: 134 MG/DL (ref 65–100)
GLUCOSE UR QL STRIP.AUTO: NEGATIVE MG/DL
GLUCOSE UR STRIP.AUTO-MCNC: NEGATIVE MG/DL
HCT VFR BLD AUTO: 48.9 % (ref 41.1–50.3)
HGB BLD-MCNC: 16.6 G/DL (ref 13.6–17.2)
HGB UR QL STRIP: ABNORMAL
IMM GRANULOCYTES # BLD AUTO: 0 K/UL (ref 0–0.5)
IMM GRANULOCYTES NFR BLD AUTO: 0 % (ref 0–5)
KETONES UR QL STRIP.AUTO: ABNORMAL MG/DL
KETONES UR-MCNC: ABNORMAL MG/DL
LEUKOCYTE ESTERASE UR QL STRIP.AUTO: NEGATIVE
LEUKOCYTE ESTERASE UR QL STRIP: NEGATIVE
LYMPHOCYTES # BLD: 1.5 K/UL (ref 0.5–4.6)
LYMPHOCYTES NFR BLD: 30 % (ref 13–44)
MAGNESIUM SERPL-MCNC: 2.1 MG/DL (ref 1.8–2.4)
MCH RBC QN AUTO: 29.3 PG (ref 26.1–32.9)
MCHC RBC AUTO-ENTMCNC: 33.9 G/DL (ref 31.4–35)
MCV RBC AUTO: 86.2 FL (ref 79.6–97.8)
MONOCYTES # BLD: 0.6 K/UL (ref 0.1–1.3)
MONOCYTES NFR BLD: 11 % (ref 4–12)
MUCOUS THREADS URNS QL MICRO: 0 /LPF
NEUTS SEG # BLD: 3 K/UL (ref 1.7–8.2)
NEUTS SEG NFR BLD: 58 % (ref 43–78)
NITRITE UR QL STRIP.AUTO: NEGATIVE
NITRITE UR QL: NEGATIVE
NRBC # BLD: 0 K/UL (ref 0–0.2)
NT PRO BNP: 828 PG/ML (ref 5–125)
OTHER OBSERVATIONS: NORMAL
PH UR STRIP: 5 [PH] (ref 5–9)
PH UR: 5.5 [PH] (ref 5–9)
PLATELET # BLD AUTO: 139 K/UL (ref 150–450)
PMV BLD AUTO: 9.8 FL (ref 9.4–12.3)
POTASSIUM SERPL-SCNC: 3.5 MMOL/L (ref 3.5–5.1)
PROT SERPL-MCNC: 7.3 G/DL (ref 6.3–8.2)
PROT UR QL: 30 MG/DL
PROT UR STRIP-MCNC: 30 MG/DL
RBC # BLD AUTO: 5.67 M/UL (ref 4.23–5.6)
RBC # UR STRIP: ABNORMAL /UL
RBC #/AREA URNS HPF: 0 /HPF
SARS-COV-2 RDRP RESP QL NAA+PROBE: DETECTED
SERVICE CMNT-IMP: ABNORMAL
SODIUM SERPL-SCNC: 137 MMOL/L (ref 136–145)
SOURCE: ABNORMAL
SP GR UR REFRACTOMETRY: 1.03 (ref 1–1.02)
SP GR UR: >1.03 (ref 1–1.02)
TROPONIN I SERPL HS-MCNC: 25.7 PG/ML (ref 0–14)
UROBILINOGEN UR QL STRIP.AUTO: 1 EU/DL (ref 0.2–1)
UROBILINOGEN UR QL: 0.2 EU/DL (ref 0.2–1)
WBC # BLD AUTO: 5.1 K/UL (ref 4.3–11.1)
WBC URNS QL MICRO: NORMAL /HPF

## 2022-07-19 PROCEDURE — 83880 ASSAY OF NATRIURETIC PEPTIDE: CPT

## 2022-07-19 PROCEDURE — 80053 COMPREHEN METABOLIC PANEL: CPT

## 2022-07-19 PROCEDURE — 81001 URINALYSIS AUTO W/SCOPE: CPT

## 2022-07-19 PROCEDURE — 94762 N-INVAS EAR/PLS OXIMTRY CONT: CPT

## 2022-07-19 PROCEDURE — 87635 SARS-COV-2 COVID-19 AMP PRB: CPT

## 2022-07-19 PROCEDURE — 84484 ASSAY OF TROPONIN QUANT: CPT

## 2022-07-19 PROCEDURE — 85025 COMPLETE CBC W/AUTO DIFF WBC: CPT

## 2022-07-19 PROCEDURE — 2580000003 HC RX 258: Performed by: EMERGENCY MEDICINE

## 2022-07-19 PROCEDURE — 6360000002 HC RX W HCPCS: Performed by: EMERGENCY MEDICINE

## 2022-07-19 PROCEDURE — 93005 ELECTROCARDIOGRAM TRACING: CPT | Performed by: EMERGENCY MEDICINE

## 2022-07-19 PROCEDURE — 81003 URINALYSIS AUTO W/O SCOPE: CPT

## 2022-07-19 PROCEDURE — 83735 ASSAY OF MAGNESIUM: CPT

## 2022-07-19 PROCEDURE — 99285 EMERGENCY DEPT VISIT HI MDM: CPT

## 2022-07-19 PROCEDURE — 71045 X-RAY EXAM CHEST 1 VIEW: CPT

## 2022-07-19 PROCEDURE — 81015 MICROSCOPIC EXAM OF URINE: CPT

## 2022-07-19 RX ORDER — BEBTELOVIMAB 87.5 MG/ML
175 INJECTION, SOLUTION INTRAVENOUS ONCE
Status: COMPLETED | OUTPATIENT
Start: 2022-07-19 | End: 2022-07-19

## 2022-07-19 RX ORDER — SODIUM CHLORIDE 0.9 % (FLUSH) 0.9 %
5-40 SYRINGE (ML) INJECTION ONCE
Status: COMPLETED | OUTPATIENT
Start: 2022-07-19 | End: 2022-07-19

## 2022-07-19 RX ADMIN — SODIUM CHLORIDE, PRESERVATIVE FREE 5 ML: 5 INJECTION INTRAVENOUS at 21:25

## 2022-07-19 RX ADMIN — BEBTELOVIMAB 175 MG: 87.5 INJECTION, SOLUTION INTRAVENOUS at 21:25

## 2022-07-19 ASSESSMENT — ENCOUNTER SYMPTOMS
VOMITING: 0
EYE PAIN: 0
COUGH: 1
EYE REDNESS: 0
ABDOMINAL PAIN: 0
WHEEZING: 0
NAUSEA: 0
RHINORRHEA: 1
SHORTNESS OF BREATH: 1
DIARRHEA: 0

## 2022-07-19 ASSESSMENT — PAIN SCALES - GENERAL: PAINLEVEL_OUTOF10: 9

## 2022-07-19 NOTE — ED TRIAGE NOTES
Patient arrives ambulatory to triage with mask in place. Reports shortness of breath several days ago. Reports bilateral leg pain but denies swelling. Reports cough. Reports body aches. Frequent urination for several days. Has been taking diuretic as prescribed. Diabetic.

## 2022-07-20 ENCOUNTER — CARE COORDINATION (OUTPATIENT)
Dept: CARE COORDINATION | Facility: CLINIC | Age: 62
End: 2022-07-20

## 2022-07-20 NOTE — ED NOTES
Patient states his anxiety is acting up and he does not want the injection just his prescription and to go home.       Tim Mcfadden RN  07/19/22 1831

## 2022-07-20 NOTE — CARE COORDINATION
Claire  Patient contacted regarding recent visit for viral symptoms. Call within 2 business days of discharge: Yes    Health  contacted the patient by telephone to perform follow-up call. Verified name and  with patient as identifiers. Provided introduction to self, and reason for call due to viral symptoms of infection and/or exposure to COVID-19. Discussed COVID-19 related testing which was available at this time. Test results were positive. Patient informed of results, if available? Yes. Patient presented to emergency department/flu clinic with complaints of viral symptoms/exposure to COVID. Patient reports symptoms are improving. Due to no new or worsening symptoms the RN CTN/ACM was not notified for escalation. This author reviewed discharge instructions, medical action plan and red flags such as increased shortness of breath, increasing fever, worsening cough or chest pain with patient who verbalized understanding. Discussed exposure protocols and quarantine with CDC Guidelines What To Do If You Are Sick    Patient was given an opportunity for questions and concerns. The patient agrees to contact their healthcare provider for questions related to their healthcare. Author provided contact information for future reference.

## 2022-07-20 NOTE — ED PROVIDER NOTES
[facility] Emergency Department Provider Note                   PCP:                Shyann Guerra MD               Age: 63 y.o. Sex: male       ICD-10-CM    1. COVID-19 virus infection  U07.1           DISPOSITION Decision To Discharge 07/19/2022 08:24:02 PM        MDM  Number of Diagnoses or Management Options  COVID-19 virus infection: new, needed workup  Diagnosis management comments: Vital signs stable. Rapid COVID positive. Respiratory rate is inaccurate. Respiratory rate is 18. Chest x-ray clear. Discussed risk/benefits with treatment with either Paxlovid versus monoclonal antibody. Patient understands risks and is willing to receive treatment at this time. Patient states he already has a pulse oximeter at home.  ==============================================  Per pharmacy, Paxlovid contraindicated but is candidate for MAB. Patient given monoclonal antibody and monitored for 1 hour after administration. Patient in no acute distress. Patient instructed to return if symptoms worsen or progress anyway. Instructed return if O2 sats less than 90%.   Patient given return precautions       Amount and/or Complexity of Data Reviewed  Clinical lab tests: ordered and reviewed  Tests in the radiology section of CPT®: ordered and reviewed  Tests in the medicine section of CPT®: ordered and reviewed  Review and summarize past medical records: yes  Independent visualization of images, tracings, or specimens: yes    Risk of Complications, Morbidity, and/or Mortality  Presenting problems: moderate  Diagnostic procedures: moderate  Management options: moderate    Patient Progress  Patient progress: stable       Orders Placed This Encounter   Procedures    COVID-19, Rapid    XR CHEST PORTABLE    CBC with Auto Differential    Comprehensive Metabolic Panel    Magnesium    Troponin    proBNP, N-TERMINAL    Urinalysis    Urinalysis, Micro    Cardiac Monitor - ED Only    Continuous Pulse Oximetry    POCT Urine Dipstick    POCT Urinalysis no Micro    EKG 12 Lead    Saline lock IV        Segundo Hendrickson is a 63 y.o. male who presents to the Emergency Department with chief complaint of  cough, body aches. Chief Complaint   Patient presents with    Shortness of Breath      62-year-old male with history of type 2 diabetes mellitus, hyperlipidemia, hypertension, atrial fibrillation, CHF, anticoagulated on Eliquis who presents with complaint of myalgias, nonproductive cough, increased shortness of breath, chills over the past 3 to 4 days. Denies any alleviating or exacerbating factors. Rates symptoms as constant. States he received COVID-19 vaccine. In summary recent sought sick contact. Denies hemoptysis. Denies any chest discomfort or pain at this time. Denies abdominal pain, diarrhea, dysuria, dizziness, focal weakness. Patient reports compliant with all home medications including diuretic. The history is provided by the patient. No  was used. Cough  Cough characteristics:  Non-productive  Sputum characteristics:  Nondescript  Severity:  Moderate  Onset quality:  Gradual  Duration:  3 days  Timing:  Constant  Progression:  Worsening  Relieved by:  Nothing  Worsened by: Activity  Ineffective treatments:  None tried  Associated symptoms: myalgias, rhinorrhea and shortness of breath    Associated symptoms: no chest pain, no chills, no diaphoresis, no fever, no headaches, no rash and no wheezing        Review of Systems   Constitutional:  Positive for fatigue. Negative for chills, diaphoresis and fever. HENT:  Positive for congestion and rhinorrhea. Eyes:  Negative for pain and redness. Respiratory:  Positive for cough and shortness of breath. Negative for wheezing. Cardiovascular:  Negative for chest pain, palpitations and leg swelling. Gastrointestinal:  Negative for abdominal pain, diarrhea, nausea and vomiting. Genitourinary:  Negative for dysuria and flank pain. Musculoskeletal:  Positive for myalgias. Negative for neck pain and neck stiffness. Skin:  Negative for rash and wound. Neurological:  Negative for dizziness, syncope, facial asymmetry, weakness, numbness and headaches. Hematological:  Does not bruise/bleed easily. Psychiatric/Behavioral:  Negative for confusion. Past Medical History:   Diagnosis Date    Arthritis     Chronic back pain     Colon cancer ([facility] [address]     Partial Colectomy and Chemo. Depression 2015    managed well with meds    Diabetes ([facility] [address]     oral agent only/pt doesnt monitor BS at home/no s.s of hypoglycemia/    Former cigarette smoker     HTN (hypertension) 2014    controlled well with meds    Hypercholesteremia     controlled well with meds        Past Surgical History:   Procedure Laterality Date    CHOLECYSTECTOMY, LAPAROSCOPIC      COLONOSCOPY      SMALL INTESTINE SURGERY      WISDOM TOOTH EXTRACTION          Family History   Problem Relation Age of Onset    Heart Disease Neg Hx     Cancer Mother         Pancreatic    No Known Problems Brother     No Known Problems Father     No Known Problems Sister     Diabetes Brother     No Known Problems Sister     Heart Attack Brother         Social History     Socioeconomic History    Marital status: Single   Tobacco Use    Smoking status: Former     Packs/day: 0.25     Types: Cigarettes     Quit date: 2006     Years since quittin.4    Smokeless tobacco: Never   Substance and Sexual Activity    Alcohol use: No    Drug use: No         Patient has no known allergies.      Discharge Medication List as of 2022 10:00 PM        CONTINUE these medications which have NOT CHANGED    Details   sacubitril-valsartan (ENTRESTO)  MG per tablet Take 1 tablet by mouth 2 times daily, Disp-60 tablet, R-11Normal      apixaban (ELIQUIS) 5 MG TABS tablet Take 5 mg by mouth 2 times dailyHistorical Med      aspirin 81 MG EC tablet Take 81 mg by mouthHistorical Med atorvastatin (LIPITOR) 20 MG tablet Take 20 mg by mouth dailyHistorical Med      ergocalciferol (ERGOCALCIFEROL) 1.25 MG (80063 UT) capsule Take 50,000 Units by mouth every 7 daysHistorical Med      furosemide (LASIX) 40 MG tablet Take 40 mg by mouth dailyHistorical Med      metFORMIN (GLUCOPHAGE) 500 MG tablet Take 500 mg by mouth daily (with breakfast)Historical Med      metoprolol succinate (TOPROL XL) 100 MG extended release tablet Take 100 mg by mouth dailyHistorical Med      potassium chloride (KLOR-CON M) 20 MEQ extended release tablet Take 20 mEq by mouth dailyHistorical Med      spironolactone (ALDACTONE) 25 MG tablet Take 25 mg by mouth dailyHistorical Med              Vitals signs and nursing note reviewed. Patient Vitals for the past 4 hrs:   Pulse BP SpO2   07/19/22 2201 73 (!) 158/108 94 %   07/19/22 2153 54 -- --   07/19/22 2146 58 (!) 153/108 --   07/19/22 2138 60 -- 97 %   07/19/22 2131 60 (!) 154/110 97 %   07/19/22 2123 57 -- 95 %   07/19/22 2110 58 -- 97 %   07/19/22 2108 61 -- 98 %   07/19/22 2055 55 (!) 148/106 97 %   07/19/22 2053 61 -- 97 %          Physical Exam  Vitals and nursing note reviewed. Constitutional:       Appearance: He is well-developed. HENT:      Head: Normocephalic. Comments: Mild nasal congestion. Uvula midline. No posterior OP edema. No trismus or stridor. Mouth/Throat:      Mouth: Mucous membranes are moist.   Eyes:      Extraocular Movements: Extraocular movements intact. Pupils: Pupils are equal, round, and reactive to light. Neck:      Vascular: No JVD. Cardiovascular:      Rate and Rhythm: Normal rate. Pulses: Normal pulses. Pulmonary:      Effort: Pulmonary effort is normal.      Breath sounds: Normal breath sounds. Comments: CTAB. Abdominal:      Palpations: Abdomen is soft. Tenderness: There is no abdominal tenderness. There is no guarding or rebound. Musculoskeletal:         General: Normal range of motion. Cervical back: Normal range of motion. Right lower leg: No tenderness. No edema. Left lower leg: No tenderness. No edema. Comments: No LE edema. No calf TTP. Skin:     General: Skin is warm. Capillary Refill: Capillary refill takes less than 2 seconds. Findings: No erythema or rash. Neurological:      General: No focal deficit present. Mental Status: He is alert and oriented to person, place, and time. Motor: No weakness. Comments: No focal deficits. Strength 5 out of 5 throughout.         EKG 12 Lead    Date/Time: 7/19/2022 8:20 PM  Performed by: Jeison Boo MD  Authorized by: Kristin Gaston DO     ECG reviewed by ED Physician in the absence of a cardiologist: yes    Rate:     ECG rate:  67    ECG rate assessment: normal    Rhythm:     Rhythm: atrial fibrillation    Ectopy:     Ectopy: none    QRS:     QRS axis:  Normal    QRS intervals:  Normal    QRS conduction: normal    ST segments:     ST segments:  Normal  T waves:     T waves: normal        Labs Reviewed   COVID-19, RAPID - Abnormal; Notable for the following components:       Result Value    SARS-CoV-2, Rapid Detected (*)     All other components within normal limits   CBC WITH AUTO DIFFERENTIAL - Abnormal; Notable for the following components:    RBC 5.67 (*)     Platelets 762 (*)     Eosinophils % 0 (*)     All other components within normal limits   COMPREHENSIVE METABOLIC PANEL - Abnormal; Notable for the following components:    Glucose 134 (*)     GFR Non- 51 (*)     AST 39 (*)     Albumin/Globulin Ratio 1.1 (*)     All other components within normal limits   TROPONIN - Abnormal; Notable for the following components:    Troponin, High Sensitivity 25.7 (*)     All other components within normal limits   PROBNP, N-TERMINAL - Abnormal; Notable for the following components:    NT Pro- (*)     All other components within normal limits   URINALYSIS - Abnormal; Notable for the following components:    Specific Gravity, UA 1.028 (*)     Protein, UA 30 (*)     Ketones, Urine TRACE (*)     Blood, Urine TRACE (*)     All other components within normal limits   POCT URINALYSIS DIPSTICK - Abnormal; Notable for the following components:    Specific Gravity, Urine, POC >1.030 (*)     Protein, Urine, POC 30 (*)     KETONES, Urine, POC TRACE (*)     Blood, UA POC SMALL (*)     All other components within normal limits   MAGNESIUM   URINALYSIS, MICRO        XR CHEST PORTABLE   Final Result   1. No acute cardiopulmonary abnormality. ED Course as of 07/20/22 0046   Tue Jul 19, 2022 1942 Troponin, High Sensitivity(!): 25.7 [DF]   1942 NT Pro-BNP(!): 828 [DF]   1942 SARS-CoV-2, Rapid(!!): Detected [DF]   1942 Portable Chest X-ray   FINDINGS:  No focal lung consolidation. No pneumothorax. No pleural effusion. The heart is  normal in size. No evidence of acute osseous abnormality. IMPRESSION:  1. No acute cardiopulmonary abnormality. [DF]      ED Course User Index  [DF] Goldy Chowdhury MD        Voice dictation software was used during the making of this note. This software is not perfect and grammatical and other typographical errors may be present. This note has not been completely proofread for errors.      Tasha Torres MD  07/20/22 0983

## 2022-07-27 ENCOUNTER — CARE COORDINATION (OUTPATIENT)
Dept: CARE COORDINATION | Facility: CLINIC | Age: 62
End: 2022-07-27

## 2022-07-27 NOTE — CARE COORDINATION
Claire  Patient resolved from the Care Transitions episode on 7/27/2022  Discussed COVID-19 related recovery. Patient stated that he was much better with no symptoms questions or concerns at this time. Patient/family has been provided the following resources and education related to COVID-19:                         Signs, symptoms and red flags related to COVID-19            CDC exposure and quarantine guidelines            [employer] exposure contact - [phone]            Contact for their local Department of Health                 Patient currently reports that the following symptoms have improved:  fever, fatigue, pain or aching joints, cough, shortness of breath, confusion or unusual change in mental status, chills or shaking, sweating, fast heart rate, fast breathing, dizziness/lightheadedness, less urine output, cold, clammy, and pale skin, low body temperature, and no new/worsening symptoms     No further outreach scheduled with this CTN/ACM. Episode of Care resolved. Patient has this CTN/ACM contact information if future needs arise.

## 2022-08-23 ENCOUNTER — TELEPHONE (OUTPATIENT)
Dept: CARDIOLOGY CLINIC | Age: 62
End: 2022-08-23

## 2022-08-23 ENCOUNTER — OFFICE VISIT (OUTPATIENT)
Dept: CARDIOLOGY CLINIC | Age: 62
End: 2022-08-23
Payer: COMMERCIAL

## 2022-08-23 VITALS
HEART RATE: 60 BPM | SYSTOLIC BLOOD PRESSURE: 136 MMHG | WEIGHT: 263 LBS | HEIGHT: 76 IN | DIASTOLIC BLOOD PRESSURE: 86 MMHG | BODY MASS INDEX: 32.03 KG/M2

## 2022-08-23 DIAGNOSIS — U07.1 COVID-19: ICD-10-CM

## 2022-08-23 DIAGNOSIS — I10 ESSENTIAL HYPERTENSION: ICD-10-CM

## 2022-08-23 DIAGNOSIS — I50.22 CHRONIC HFREF (HEART FAILURE WITH REDUCED EJECTION FRACTION) (HCC): Primary | ICD-10-CM

## 2022-08-23 DIAGNOSIS — E78.2 MIXED HYPERLIPIDEMIA: ICD-10-CM

## 2022-08-23 PROCEDURE — 99214 OFFICE O/P EST MOD 30 MIN: CPT | Performed by: INTERNAL MEDICINE

## 2022-08-23 ASSESSMENT — ENCOUNTER SYMPTOMS: SHORTNESS OF BREATH: 0

## 2022-08-23 NOTE — TELEPHONE ENCOUNTER
I spoke with the patient, he say's he stopped it because he was not sure if he should have been taking it along with the lasix. He agreed to resume it and have labs drawn in four weeks as recommended.    Orders Placed This Encounter   Procedures    Basic Metabolic Panel     Standing Status:   Future     Standing Expiration Date:   8/23/2023

## 2022-08-23 NOTE — PATIENT INSTRUCTIONS
Patient Education        Heart Failure: Care Instructions  Your Care Instructions     Heart failure occurs when your heart does not pump as much blood as the body needs. Failure does not mean that the heart has stopped pumping but rather that it is not pumping as well as it should. Over time, this causes fluid buildup in your lungs and other parts of your body. Fluid buildup can cause shortness of breath, fatigue, swollen ankles, and other problems. By taking medicines regularly, reducing sodium (salt) in your diet, checking your weight every day,and making lifestyle changes, you can feel better and live longer. Follow-up care is a key part of your treatment and safety. Be sure to make and go to all appointments, and call your doctor if you are having problems. It's also a good idea to know your test results and keep alist of the medicines you take. How can you care for yourself at home? Medicines    Be safe with medicines. Take your medicines exactly as prescribed. Call your doctor if you think you are having a problem with your medicine. Do not take any vitamins, over-the-counter medicine, or herbal products without talking to your doctor first. Cally Soto not take ibuprofen (Advil or Motrin) and naproxen (Aleve) without talking to your doctor first. They could make your heart failure worse. You may take some of the following medicine. Angiotensin-converting enzyme inhibitors (ACEIs) or angiotensin II receptor blockers (ARBs) reduce the heart's workload, lower blood pressure, and reduce swelling. Taking an ACEI or ARB may lower your chance of needing to be hospitalized. Beta-blockers can slow heart rate, decrease blood pressure, and improve your condition. Taking a beta-blocker may lower your chance of needing to be hospitalized. Diuretics, also called water pills, reduce swelling. You will get more details on the specific medicines your doctor prescribes.   Diet    Your doctor may suggest that you limit sodium. Your doctor can tell you how much sodium is right for you. An example is less than 3,000 mg a day. This includes all the salt you eat in cooking or in packaged foods. People get most of their sodium from processed foods. Fast food and restaurant meals also tend to be very high in sodium. Ask your doctor how much liquid you can drink each day. You may have to limit liquids. Weight    Weigh yourself without clothing at the same time each day. Record your weight. Call your doctor if you have a sudden weight gain, such as more than 2 to 3 pounds in a day or 5 pounds in a week. (Your doctor may suggest a different range of weight gain.) A sudden weight gain may mean that your heart failure is getting worse. Activity level    Start light exercise (if your doctor says it is okay). Even if you can only do a small amount, exercise will help you get stronger, have more energy, and manage your weight and your stress. Walking is an easy way to get exercise. Start out by walking a little more than you did before. Bit by bit, increase the amount you walk. When you exercise, watch for signs that your heart is working too hard. You are pushing yourself too hard if you cannot talk while you are exercising. If you become short of breath or dizzy or have chest pain, stop, sit down, and rest.     If you feel \"wiped out\" the day after you exercise, walk slower or for a shorter distance until you can work up to a better pace. Get enough rest at night. Sleeping with 1 or 2 pillows under your upper body and head may help you breathe easier. Lifestyle changes    Do not smoke. Smoking can make a heart condition worse. If you need help quitting, talk to your doctor about stop-smoking programs and medicines. These can increase your chances of quitting for good. Quitting smoking may be the most important step you can take to protect your heart. Limit alcohol to 2 drinks a day for men and 1 drink a day for women. Too much alcohol can cause health problems. Avoid getting sick from colds and the flu. Get a pneumococcal vaccine shot. If you have had one before, ask your doctor whether you need another dose. Get a flu shot each year. If you must be around people with colds or the flu, wash your hands often. When should you call for help? Call 911  if you have symptoms of sudden heart failure such as: You have severe trouble breathing. You cough up pink, foamy mucus. You have a new irregular or rapid heartbeat. Call your doctor now or seek immediate medical care if:    You have new or increased shortness of breath. You are dizzy or lightheaded, or you feel like you may faint. You have sudden weight gain, such as more than 2 to 3 pounds in a day or 5 pounds in a week. (Your doctor may suggest a different range of weight gain.)     You have increased swelling in your legs, ankles, or feet. You are suddenly so tired or weak that you cannot do your usual activities. Watch closely for changes in your health, and be sure to contact your doctor ifyou develop new symptoms. Where can you learn more? Go to https://[email or web address]. org and sign in to your [employer] account. Enter H656 in the [employer] box to learn more about \"Heart Failure: Care Instructions. \"     If you do not have an account, please click on the \"Sign Up Now\" link. Current as of: January 10, 2022               Content Version: 13.3  © 2006-2022 [email or web address]. Care instructions adapted under license by [facility] ([facility]). If you have questions about a medical condition or this instruction, always ask your healthcare professional. [place] [address] any warranty or liability for your use of this information. Please visit www.cardiosmart. org for more information regarding cardiovascular disease prevention and treatment.

## 2022-08-23 NOTE — PROGRESS NOTES
[address], [address], [address] [place] [address]  PHONE: [phone]      22    NAME:  Bernie Roberto  : 1960  MRN: [id number]         SUBJECTIVE:   Bernie Roberto is a 63 y.o. male seen for a follow up visit regarding the following:     Chief Complaint   Patient presents with    Congestive Heart Failure     6 mo f/u               HPI:  Follow up  Congestive Heart Failure (6 mo f/u )   . Follow up NICM with HFrEF, limited insight. EF has improved significantly, now 45-50%. On Max tolerated GDMT  with resting bradycardia. ER visit 22 with COVID, treated with monoclonal antibody (not candidate for paxlovid), fortunately he has been vaxed though not yet boosted. He feels fully recovered and has no complaints of chest discomfort, dyspnea or LE edema. He has what appears likely elbow bursitis and is referred to PCP or urgent care if worse, meanwhile ice/tylenol and rest.            PAST CARDIAC HISTORY:    - afib -rate control, anticoagulation but failed to follow up   2021- Echo dilated LV/LA, EF 30-35%, moderate MR   LHC - nonobstructive coronary atherosclerosis   2022- EF 45-50%          Key CAD CHF Meds            sacubitril-valsartan (ENTRESTO)  MG per tablet (Taking)    Sig - Route: Take 1 tablet by mouth 2 times daily - Oral    apixaban (ELIQUIS) 5 MG TABS tablet (Taking)    Class: Historical Med    atorvastatin (LIPITOR) 20 MG tablet (Taking)    Class: Historical Med    furosemide (LASIX) 40 MG tablet (Taking)    Class: Historical Med    metoprolol succinate (TOPROL XL) 100 MG extended release tablet (Taking)    Class: Historical Med    spironolactone (ALDACTONE) 25 MG tablet    Class: Historical Med              Past Medical History, Past Surgical History, Family history, Social History, and Medications were all reviewed with the patient today and updated as necessary.      Prior to Admission medications    Medication Sig Start Date End Date Taking? Authorizing Provider   sacubitril-valsartan (ENTRESTO)  MG per tablet Take 1 tablet by mouth 2 times daily 6/1/22  Yes Fariba Pa MD   apixaban (ELIQUIS) 5 MG TABS tablet Take 5 mg by mouth 2 times daily 12/16/21  Yes Ar Automatic Reconciliation   aspirin 81 MG EC tablet Take 81 mg by mouth   Yes Ar Automatic Reconciliation   atorvastatin (LIPITOR) 20 MG tablet Take 20 mg by mouth daily 5/14/15  Yes Ar Automatic Reconciliation   furosemide (LASIX) 40 MG tablet Take 40 mg by mouth daily 4/14/22  Yes Ar Automatic Reconciliation   metFORMIN (GLUCOPHAGE) 500 MG tablet Take 500 mg by mouth daily (with breakfast)   Yes Ar Automatic Reconciliation   metoprolol succinate (TOPROL XL) 100 MG extended release tablet Take 100 mg by mouth daily 11/18/21  Yes Ar Automatic Reconciliation   potassium chloride (KLOR-CON M) 20 MEQ extended release tablet Take 20 mEq by mouth daily 11/16/21  Yes Ar Automatic Reconciliation   ergocalciferol (ERGOCALCIFEROL) 1.25 MG (97118 UT) capsule Take 50,000 Units by mouth every 7 days 1/21/22   Ar Automatic Reconciliation   spironolactone (ALDACTONE) 25 MG tablet Take 25 mg by mouth daily  Patient not taking: Reported on 8/23/2022 9/8/21   Ar Automatic Reconciliation     No Known Allergies  Past Medical History:   Diagnosis Date    Arthritis     Chronic back pain     Colon cancer ([facility] [address] 1990    Partial Colectomy and Chemo.     Depression 05/14/2015    managed well with meds    Diabetes ([facility] [address]     oral agent only/pt doesnt monitor BS at home/no s.s of hypoglycemia/    Former cigarette smoker     HTN (hypertension) 04/28/2014    controlled well with meds    Hypercholesteremia     controlled well with meds     Past Surgical History:   Procedure Laterality Date    CHOLECYSTECTOMY, LAPAROSCOPIC      COLONOSCOPY      SMALL INTESTINE SURGERY  1990    WISDOM TOOTH EXTRACTION       Family History   Problem Relation Age of Onset    Heart Disease Neg Hx     Cancer Mother Pancreatic    No Known Problems Brother     No Known Problems Father     No Known Problems Sister     Diabetes Brother     No Known Problems Sister     Heart Attack Brother      Social History     Tobacco Use    Smoking status: Former     Packs/day: 0.25     Types: Cigarettes     Quit date: 2006     Years since quittin.5    Smokeless tobacco: Never   Substance Use Topics    Alcohol use: No       ROS:    Review of Systems   Cardiovascular:  Negative for chest pain and leg swelling. Respiratory:  Negative for shortness of breath. PHYSICAL EXAM:   /86   Pulse 60 Comment: i  Ht 6' 4\" (1.93 m)   Wt 263 lb (119.3 kg)   BMI 32.01 kg/m²      Wt Readings from Last 3 Encounters:   22 263 lb (119.3 kg)   22 180 lb (81.6 kg)   22 241 lb (109.3 kg)     BP Readings from Last 3 Encounters:   22 136/86   22 (!) 158/108   22 136/88     Pulse Readings from Last 3 Encounters:   22 60   22 73   22 56           Physical Exam  Constitutional:       Appearance: Normal appearance. HENT:      Head: Normocephalic and atraumatic. Eyes:      Conjunctiva/sclera: Conjunctivae normal.   Neck:      Vascular: No carotid bruit. Cardiovascular:      Rate and Rhythm: Normal rate and regular rhythm. Heart sounds: No murmur heard. No friction rub. No gallop. Pulmonary:      Effort: No respiratory distress. Breath sounds: No wheezing or rales. Musculoskeletal:         General: No swelling. Cervical back: Neck supple. Skin:     General: Skin is warm and dry. Neurological:      General: No focal deficit present. Mental Status: He is alert. Psychiatric:         Mood and Affect: Mood normal.         Behavior: Behavior normal.       Medical problems and test results were reviewed with the patient today.      DATA REVIEW    LIPID PANEL     Lab Results   Component Value Date    CHOL 116 2021     Lab Results   Component Value Date TRIG 90 05/21/2021     Lab Results   Component Value Date    HDL 24 (L) 05/21/2021     Lab Results   Component Value Date    LDLCALC 74 05/21/2021     Lab Results   Component Value Date    LABVLDL 18 05/21/2021     Lab Results   Component Value Date    CHOLHDLRATIO 4.8 05/21/2021     Lab Results   Component Value Date    WBC 5.1 07/19/2022    HGB 16.6 07/19/2022    HCT 48.9 07/19/2022    MCV 86.2 07/19/2022     (L) 07/19/2022       BMP  Lab Results   Component Value Date/Time     07/19/2022 03:40 PM    K 3.5 07/19/2022 03:40 PM     07/19/2022 03:40 PM    CO2 24 07/19/2022 03:40 PM    BUN 21 07/19/2022 03:40 PM    CREATININE 1.50 07/19/2022 03:40 PM    GLUCOSE 134 07/19/2022 03:40 PM    CALCIUM 8.4 07/19/2022 03:40 PM          EKG    7/19/22 - rate controlled afib, nstwf, no change    CXR/IMAGING    7/19/22 - no acute abnormality, normal heart size    DEVICE INTERROGATION        OUTSIDE RECORDS REVIEW    Records from outside providers have been reviewed and summarized as noted in the HPI, past history and data review sections of this note, and reviewed with patient. .       ASSESSMENT and PLAN    Robb Velazquez was seen today for congestive heart failure. Diagnoses and all orders for this visit:    Chronic HFrEF (heart failure with reduced ejection fraction) (HCC)  -     Transthoracic echocardiogram (TTE) complete with contrast, bubble, strain, and 3D PRN; Future    Essential hypertension    Mixed hyperlipidemia    COVID-19        IMPRESSION:      NYHA II, continue meds  Echo before next visit    Lipids at goal, continue meds    Did not see until patient left that list \"not taking\" by spironolactone, unclear. Will have staff contact him to resume and repeat BMP in ~ 4 weeks, K+ has been consistently normal to low. See HPI, conservative therapy likely left olecranon bursitis, urgent care or PCP if worse. Return in about 6 months (around 2/23/2023).           Thank you for allowing me to participate in this patient's care. Please call or contact me if there are any questions or concerns regarding the above.       Risa Alvarenga MD  08/23/22  8:07 AM

## 2022-08-23 NOTE — TELEPHONE ENCOUNTER
Please call patient. Because of the way the med list is displayed in multiple screens and multiple formats, I did not see until after he left that he is not taking spironolactone but I see no indication why that would be. If he knows of a reason, please help me document it but otherwise, please resume it and get a bmp in 4 weeks.   thanks

## 2022-09-06 ENCOUNTER — TELEPHONE (OUTPATIENT)
Dept: CARDIOLOGY CLINIC | Age: 62
End: 2022-09-06

## 2022-09-06 NOTE — TELEPHONE ENCOUNTER
Patient having Colonoscopy with Dr. Charmayne Milliner on 09/12/22. Needs eliquis hold 48 hours prior.  Fax: [phone]

## 2022-09-12 ENCOUNTER — HOSPITAL ENCOUNTER (OUTPATIENT)
Dept: LAB | Age: 62
Setting detail: SPECIMEN
Discharge: HOME | End: 2022-09-15

## 2022-09-12 PROCEDURE — 88305 TISSUE EXAM BY PATHOLOGIST: CPT

## 2022-10-10 DIAGNOSIS — I10 ESSENTIAL HYPERTENSION: ICD-10-CM

## 2022-10-10 DIAGNOSIS — I50.22 CHRONIC HFREF (HEART FAILURE WITH REDUCED EJECTION FRACTION) (HCC): ICD-10-CM

## 2022-10-10 LAB
ANION GAP SERPL CALC-SCNC: 3 MMOL/L (ref 4–13)
BUN SERPL-MCNC: 12 MG/DL (ref 8–23)
CALCIUM SERPL-MCNC: 9.6 MG/DL (ref 8.3–10.4)
CHLORIDE SERPL-SCNC: 106 MMOL/L (ref 101–110)
CO2 SERPL-SCNC: 29 MMOL/L (ref 21–32)
CREAT SERPL-MCNC: 1.3 MG/DL (ref 0.8–1.5)
GLUCOSE SERPL-MCNC: 99 MG/DL (ref 65–100)
POTASSIUM SERPL-SCNC: 4.3 MMOL/L (ref 3.5–5.1)
SODIUM SERPL-SCNC: 138 MMOL/L (ref 136–145)

## 2022-10-24 RX ORDER — POTASSIUM CHLORIDE 20 MEQ/1
20 TABLET, EXTENDED RELEASE ORAL DAILY
Qty: 30 TABLET | Refills: 11 | Status: SHIPPED | OUTPATIENT
Start: 2022-10-24

## 2022-10-24 NOTE — TELEPHONE ENCOUNTER
MEDICATION REFILL REQUEST      Name of Medication:  Klr-con 20 MEQ  Dose:  20 MEQ  Frequency:  1 a day  Quantity:  30  Days' supply:  30 with 3 refills      Pharmacy Name/Location:  [facility] [phone]

## 2022-12-19 NOTE — TELEPHONE ENCOUNTER
MEDICATION REFILL REQUEST      Name of Medication:  Eliquis  Dose:  5 mg  Frequency:  5 mg  Quantity:  ?  Days' supply:  ? Pharmacy Name/Location:  [facility] [address] [facility]    Pt has been out of this Rx for 2 days.

## 2023-02-16 PROBLEM — I42.8 NONISCHEMIC CARDIOMYOPATHY (HCC): Status: ACTIVE | Noted: 2023-02-16

## 2023-02-16 PROBLEM — I50.22 CHRONIC HFREF (HEART FAILURE WITH REDUCED EJECTION FRACTION) (HCC): Status: RESOLVED | Noted: 2021-11-18 | Resolved: 2023-02-16

## 2023-02-16 NOTE — PROGRESS NOTES
[address] [address] Kandi Gonzalez, [address]  PHONE: [phone]      23    NAME:  Army Villafuerte  : 1960  MRN: [id number]         SUBJECTIVE:   Army Villafuerte is a 63 y.o. male seen for a follow up visit regarding the following:     Chief Complaint   Patient presents with    Hypertension    Hyperlipidemia    Results     echo              HPI:  Follow up  Hypertension, Hyperlipidemia, and Results (echo)   . Follow up NICM with HFrEF, limited insight. Afib  EF has improved significantly, now 45-50%. On Max tolerated GDMT (apart from SGLT2i -EF now now solidly normal) therapy, with resting bradycardia. Echo looks fantastic with now completely recovered EF! He feels well, has no complaints, denies dyspnea or chest pain             PAST CARDIAC HISTORY:    - afib -rate control, anticoagulation but failed to follow up   2021- Echo dilated LV/LA, EF 30-35%, moderate MR   LHC - nonobstructive coronary atherosclerosis   2022- EF 45-50%  2023       EF 59%, JALEN, mild MR, ind DF          [place] CAD CHF Meds            apixaban (ELIQUIS) 5 MG TABS tablet (Taking)    Sig - Route: Take 1 tablet by mouth 2 times daily - Oral    sacubitril-valsartan (ENTRESTO)  MG per tablet (Taking)    Sig - Route: Take 1 tablet by mouth 2 times daily - Oral    atorvastatin (LIPITOR) 20 MG tablet (Taking)    Sig - Route: Take 1 tablet by mouth daily - Oral    furosemide (LASIX) 40 MG tablet (Taking)    Sig - Route: Take 1 tablet by mouth daily - Oral    metoprolol succinate (TOPROL XL) 100 MG extended release tablet (Taking)    Sig - Route: Take 1 tablet by mouth daily - Oral    spironolactone (ALDACTONE) 25 MG tablet (Taking)    Sig - Route:  Take 1 tablet by mouth daily - Oral          Key Antihyperglycemic Medications            metFORMIN (GLUCOPHAGE) 500 MG tablet (Taking)    Class: Historical Med                Past Medical History, Past Surgical History, Family history, Social History, and Medications were all reviewed with the patient today and updated as necessary. Prior to Admission medications    Medication Sig Start Date End Date Taking? Authorizing Provider   apixaban (ELIQUIS) 5 MG TABS tablet Take 1 tablet by mouth 2 times daily 2/17/23  Yes Preston Santoro MD   sacubitril-valsartan (ENTRESTO)  MG per tablet Take 1 tablet by mouth 2 times daily 2/17/23  Yes Preston Santoro MD   atorvastatin (LIPITOR) 20 MG tablet Take 1 tablet by mouth daily 2/17/23  Yes Preston Santoro MD   furosemide (LASIX) 40 MG tablet Take 1 tablet by mouth daily 2/17/23  Yes Preston Santoro MD   metoprolol succinate (TOPROL XL) 100 MG extended release tablet Take 1 tablet by mouth daily 2/17/23  Yes Preston Santoro MD   spironolactone (ALDACTONE) 25 MG tablet Take 1 tablet by mouth daily 2/17/23  Yes Preston Santoro MD   potassium chloride (KLOR-CON M) 20 MEQ extended release tablet Take 1 tablet by mouth daily 2/17/23  Yes Preston Santoro MD   aspirin 81 MG EC tablet Take 81 mg by mouth   Yes Ar Automatic Reconciliation   metFORMIN (GLUCOPHAGE) 500 MG tablet Take 500 mg by mouth daily (with breakfast)   Yes Ar Automatic Reconciliation     No Known Allergies  Past Medical History:   Diagnosis Date    Arthritis     Chronic back pain     Colon cancer ([facility] [address] 1990    Partial Colectomy and Chemo.     Depression 05/14/2015    managed well with meds    Diabetes ([address]     oral agent only/pt doesnt monitor BS at home/no s.s of hypoglycemia/    Former cigarette smoker     HTN (hypertension) 04/28/2014    controlled well with meds    Hypercholesteremia     controlled well with meds     Past Surgical History:   Procedure Laterality Date    CHOLECYSTECTOMY, LAPAROSCOPIC      COLONOSCOPY      SMALL INTESTINE SURGERY  1990    WISDOM TOOTH EXTRACTION       Family History   Problem Relation Age of Onset    Heart Disease Neg Hx     Cancer Mother Pancreatic    No Known Problems Brother     No Known Problems Father     No Known Problems Sister     Diabetes Brother     No Known Problems Sister     Heart Attack Brother      Social History     Tobacco Use    Smoking status: Former     Packs/day: 0.25     Types: Cigarettes     Quit date: 2006     Years since quittin.9    Smokeless tobacco: Never   Substance Use Topics    Alcohol use: No       ROS:    Review of Systems   Cardiovascular:  Negative for chest pain and leg swelling. Respiratory:  Negative for shortness of breath. PHYSICAL EXAM:   BP 90/62   Pulse 60   Ht 6' 4\" (1.93 m)   Wt 221 lb 6.4 oz (100.4 kg) Comment: with shoes  BMI 26.95 kg/m²      Wt Readings from Last 3 Encounters:   23 221 lb 6.4 oz (100.4 kg)   23 263 lb (119.3 kg)   22 263 lb (119.3 kg)     BP Readings from Last 3 Encounters:   23 90/62   23 136/86   22 136/86     Pulse Readings from Last 3 Encounters:   23 60   22 60   22 73           Physical Exam  Constitutional:       Appearance: Normal appearance. HENT:      Head: Normocephalic and atraumatic. Eyes:      Conjunctiva/sclera: Conjunctivae normal.   Neck:      Vascular: No carotid bruit. Cardiovascular:      Rate and Rhythm: Normal rate and regular rhythm. Heart sounds: No murmur heard. No friction rub. No gallop. Pulmonary:      Effort: No respiratory distress. Breath sounds: No wheezing or rales. Musculoskeletal:         General: No swelling. Cervical back: Neck supple. Skin:     General: Skin is warm and dry. Neurological:      General: No focal deficit present. Mental Status: He is alert. Psychiatric:         Mood and Affect: Mood normal.         Behavior: Behavior normal.       Medical problems and test results were reviewed with the patient today.      DATA REVIEW    LIPID PANEL     Lab Results   Component Value Date    CHOL 116 2021     Lab Results   Component Value Date    TRIG 90 05/21/2021     Lab Results   Component Value Date    HDL 24 (L) 05/21/2021     Lab Results   Component Value Date    LDLCALC 74 05/21/2021     Lab Results   Component Value Date    LABVLDL 18 05/21/2021     Lab Results   Component Value Date    CHOLHDLRATIO 4.8 05/21/2021       BMP  Lab Results   Component Value Date/Time     10/10/2022 08:28 AM    K 4.3 10/10/2022 08:28 AM     10/10/2022 08:28 AM    CO2 29 10/10/2022 08:28 AM    BUN 12 10/10/2022 08:28 AM    CREATININE 1.30 10/10/2022 08:28 AM    GLUCOSE 99 10/10/2022 08:28 AM    CALCIUM 9.6 10/10/2022 08:28 AM      April 2022   A1C 6.4%  Normal CBC    EKG        CXR/IMAGING        DEVICE INTERROGATION        OUTSIDE RECORDS REVIEW    Records from outside providers have been reviewed and summarized as noted in the HPI, past history and data review sections of this note, and reviewed with patient. .       ASSESSMENT and Dilshad Ciara was seen today for hypertension, hyperlipidemia and results. Diagnoses and all orders for this visit:    Essential hypertension    Nonischemic cardiomyopathy ([facility] [address]  -     Basic Metabolic Panel; Future    Permanent atrial fibrillation (HCC)    Controlled type 2 diabetes mellitus with stage 3 chronic kidney disease, without long-term current use of insulin (HCC)    Dyslipidemia    Other orders  -     apixaban (ELIQUIS) 5 MG TABS tablet; Take 1 tablet by mouth 2 times daily  -     sacubitril-valsartan (ENTRESTO)  MG per tablet; Take 1 tablet by mouth 2 times daily  -     atorvastatin (LIPITOR) 20 MG tablet; Take 1 tablet by mouth daily  -     furosemide (LASIX) 40 MG tablet; Take 1 tablet by mouth daily  -     metoprolol succinate (TOPROL XL) 100 MG extended release tablet; Take 1 tablet by mouth daily  -     spironolactone (ALDACTONE) 25 MG tablet; Take 1 tablet by mouth daily  -     potassium chloride (KLOR-CON M) 20 MEQ extended release tablet;  Take 1 tablet by mouth daily        IMPRESSION:    NYHA II, continue meds, EF fully recovered    BP low today, continue to monitor, normally controlled    Lipids at goal, continue meds     Rate controlled, anticoagulated, continue meds        Return in about 1 year (around 2/17/2024). Thank you for allowing me to participate in this patient's care. Please call or contact me if there are any questions or concerns regarding the above.       Ken Fernández MD  02/17/23  7:59 AM

## 2023-02-17 ENCOUNTER — TELEPHONE (OUTPATIENT)
Dept: CARDIOLOGY CLINIC | Age: 63
End: 2023-02-17

## 2023-02-17 ENCOUNTER — OFFICE VISIT (OUTPATIENT)
Dept: CARDIOLOGY CLINIC | Age: 63
End: 2023-02-17
Payer: MEDICARE

## 2023-02-17 VITALS
BODY MASS INDEX: 26.96 KG/M2 | DIASTOLIC BLOOD PRESSURE: 62 MMHG | HEART RATE: 60 BPM | SYSTOLIC BLOOD PRESSURE: 90 MMHG | HEIGHT: 76 IN | WEIGHT: 221.4 LBS

## 2023-02-17 DIAGNOSIS — N18.30 CONTROLLED TYPE 2 DIABETES MELLITUS WITH STAGE 3 CHRONIC KIDNEY DISEASE, WITHOUT LONG-TERM CURRENT USE OF INSULIN (HCC): ICD-10-CM

## 2023-02-17 DIAGNOSIS — E11.22 CONTROLLED TYPE 2 DIABETES MELLITUS WITH STAGE 3 CHRONIC KIDNEY DISEASE, WITHOUT LONG-TERM CURRENT USE OF INSULIN (HCC): ICD-10-CM

## 2023-02-17 DIAGNOSIS — I42.8 NONISCHEMIC CARDIOMYOPATHY (HCC): ICD-10-CM

## 2023-02-17 DIAGNOSIS — I10 ESSENTIAL HYPERTENSION: Primary | ICD-10-CM

## 2023-02-17 DIAGNOSIS — E87.5 HYPERKALEMIA: Primary | ICD-10-CM

## 2023-02-17 DIAGNOSIS — E78.5 DYSLIPIDEMIA: ICD-10-CM

## 2023-02-17 DIAGNOSIS — I48.21 PERMANENT ATRIAL FIBRILLATION (HCC): ICD-10-CM

## 2023-02-17 LAB
ANION GAP SERPL CALC-SCNC: 4 MMOL/L (ref 2–11)
BUN SERPL-MCNC: 17 MG/DL (ref 8–23)
CALCIUM SERPL-MCNC: 10.1 MG/DL (ref 8.3–10.4)
CHLORIDE SERPL-SCNC: 108 MMOL/L (ref 101–110)
CO2 SERPL-SCNC: 29 MMOL/L (ref 21–32)
CREAT SERPL-MCNC: 1.4 MG/DL (ref 0.8–1.5)
GLUCOSE SERPL-MCNC: 116 MG/DL (ref 65–100)
POTASSIUM SERPL-SCNC: 5.2 MMOL/L (ref 3.5–5.1)
SODIUM SERPL-SCNC: 141 MMOL/L (ref 133–143)

## 2023-02-17 PROCEDURE — 1036F TOBACCO NON-USER: CPT | Performed by: INTERNAL MEDICINE

## 2023-02-17 PROCEDURE — G8427 DOCREV CUR MEDS BY ELIG CLIN: HCPCS | Performed by: INTERNAL MEDICINE

## 2023-02-17 PROCEDURE — 3074F SYST BP LT 130 MM HG: CPT | Performed by: INTERNAL MEDICINE

## 2023-02-17 PROCEDURE — G8484 FLU IMMUNIZE NO ADMIN: HCPCS | Performed by: INTERNAL MEDICINE

## 2023-02-17 PROCEDURE — G8417 CALC BMI ABV UP PARAM F/U: HCPCS | Performed by: INTERNAL MEDICINE

## 2023-02-17 PROCEDURE — 2022F DILAT RTA XM EVC RTNOPTHY: CPT | Performed by: INTERNAL MEDICINE

## 2023-02-17 PROCEDURE — 3046F HEMOGLOBIN A1C LEVEL >9.0%: CPT | Performed by: INTERNAL MEDICINE

## 2023-02-17 PROCEDURE — 3078F DIAST BP <80 MM HG: CPT | Performed by: INTERNAL MEDICINE

## 2023-02-17 PROCEDURE — 3017F COLORECTAL CA SCREEN DOC REV: CPT | Performed by: INTERNAL MEDICINE

## 2023-02-17 PROCEDURE — 99214 OFFICE O/P EST MOD 30 MIN: CPT | Performed by: INTERNAL MEDICINE

## 2023-02-17 RX ORDER — FUROSEMIDE 40 MG/1
40 TABLET ORAL DAILY
Qty: 90 TABLET | Refills: 3 | Status: SHIPPED | OUTPATIENT
Start: 2023-02-17

## 2023-02-17 RX ORDER — POTASSIUM CHLORIDE 20 MEQ/1
20 TABLET, EXTENDED RELEASE ORAL DAILY
Qty: 90 TABLET | Refills: 3 | Status: SHIPPED | OUTPATIENT
Start: 2023-02-17

## 2023-02-17 RX ORDER — SACUBITRIL AND VALSARTAN 97; 103 MG/1; MG/1
1 TABLET, FILM COATED ORAL 2 TIMES DAILY
Qty: 60 TABLET | Refills: 11 | Status: SHIPPED | OUTPATIENT
Start: 2023-02-17

## 2023-02-17 RX ORDER — ATORVASTATIN CALCIUM 20 MG/1
20 TABLET, FILM COATED ORAL DAILY
Qty: 90 TABLET | Refills: 3 | Status: SHIPPED | OUTPATIENT
Start: 2023-02-17

## 2023-02-17 RX ORDER — SPIRONOLACTONE 25 MG/1
25 TABLET ORAL DAILY
Qty: 90 TABLET | Refills: 3 | Status: SHIPPED | OUTPATIENT
Start: 2023-02-17 | End: 2023-02-17 | Stop reason: CLARIF

## 2023-02-17 RX ORDER — METOPROLOL SUCCINATE 100 MG/1
100 TABLET, EXTENDED RELEASE ORAL DAILY
Qty: 90 TABLET | Refills: 3 | Status: SHIPPED | OUTPATIENT
Start: 2023-02-17

## 2023-02-17 ASSESSMENT — ENCOUNTER SYMPTOMS: SHORTNESS OF BREATH: 0

## 2023-02-17 NOTE — TELEPHONE ENCOUNTER
Patient notified of test results and physician's recommendations. Patient voiced understanding.    Orders Placed This Encounter   Procedures    Basic Metabolic Panel     Standing Status:   Future     Standing Expiration Date:   2/17/2024

## 2023-02-17 NOTE — TELEPHONE ENCOUNTER
----- Message from Ramona Daniel MD sent at 2/17/2023  2:36 PM EST -----  Potassium mildly elevated. Recommend stop spironolactone, continue everything else and please get another BMP in 1 weeks.  thanks

## 2023-02-24 DIAGNOSIS — E87.5 HYPERKALEMIA: ICD-10-CM

## 2023-02-24 LAB
ANION GAP SERPL CALC-SCNC: 6 MMOL/L (ref 2–11)
BUN SERPL-MCNC: 14 MG/DL (ref 8–23)
CALCIUM SERPL-MCNC: 9.2 MG/DL (ref 8.3–10.4)
CHLORIDE SERPL-SCNC: 107 MMOL/L (ref 101–110)
CO2 SERPL-SCNC: 28 MMOL/L (ref 21–32)
CREAT SERPL-MCNC: 1.3 MG/DL (ref 0.8–1.5)
GLUCOSE SERPL-MCNC: 114 MG/DL (ref 65–100)
POTASSIUM SERPL-SCNC: 4.4 MMOL/L (ref 3.5–5.1)
SODIUM SERPL-SCNC: 141 MMOL/L (ref 133–143)

## 2023-04-01 ENCOUNTER — HOSPITAL ENCOUNTER (EMERGENCY)
Age: 63
Discharge: HOME | End: 2023-04-01
Attending: EMERGENCY MEDICINE
Payer: MEDICARE

## 2023-04-01 VITALS
HEIGHT: 76 IN | SYSTOLIC BLOOD PRESSURE: 148 MMHG | WEIGHT: 220 LBS | BODY MASS INDEX: 26.79 KG/M2 | HEART RATE: 88 BPM | DIASTOLIC BLOOD PRESSURE: 85 MMHG | RESPIRATION RATE: 16 BRPM | OXYGEN SATURATION: 97 % | TEMPERATURE: 97.5 F

## 2023-04-01 DIAGNOSIS — N40.0 ENLARGED PROSTATE: ICD-10-CM

## 2023-04-01 DIAGNOSIS — R33.8 ACUTE URINARY RETENTION: Primary | ICD-10-CM

## 2023-04-01 LAB
ALBUMIN SERPL-MCNC: 4.4 G/DL (ref 3.2–4.6)
ALBUMIN/GLOB SERPL: 1.3 (ref 0.4–1.6)
ALP SERPL-CCNC: 64 U/L (ref 50–136)
ALT SERPL-CCNC: 26 U/L (ref 12–65)
ANION GAP SERPL CALC-SCNC: 6 MMOL/L (ref 2–11)
APPEARANCE UR: CLEAR
AST SERPL-CCNC: 16 U/L (ref 15–37)
BACTERIA URNS QL MICRO: NEGATIVE /HPF
BILIRUB SERPL-MCNC: 0.5 MG/DL (ref 0.2–1.1)
BILIRUB UR QL: NEGATIVE
BUN SERPL-MCNC: 15 MG/DL (ref 8–23)
CALCIUM SERPL-MCNC: 9.8 MG/DL (ref 8.3–10.4)
CASTS URNS QL MICRO: ABNORMAL /LPF
CHLORIDE SERPL-SCNC: 107 MMOL/L (ref 101–110)
CO2 SERPL-SCNC: 23 MMOL/L (ref 21–32)
COLOR UR: ABNORMAL
CREAT SERPL-MCNC: 1.5 MG/DL (ref 0.8–1.5)
EPI CELLS #/AREA URNS HPF: ABNORMAL /HPF
ERYTHROCYTE [DISTWIDTH] IN BLOOD BY AUTOMATED COUNT: 13.8 % (ref 11.9–14.6)
GLOBULIN SER CALC-MCNC: 3.4 G/DL (ref 2.8–4.5)
GLUCOSE SERPL-MCNC: 120 MG/DL (ref 65–100)
GLUCOSE UR STRIP.AUTO-MCNC: NEGATIVE MG/DL
HCT VFR BLD AUTO: 49.8 % (ref 41.1–50.3)
HGB BLD-MCNC: 17 G/DL (ref 13.6–17.2)
HGB UR QL STRIP: ABNORMAL
KETONES UR QL STRIP.AUTO: NEGATIVE MG/DL
LEUKOCYTE ESTERASE UR QL STRIP.AUTO: NEGATIVE
MCH RBC QN AUTO: 30 PG (ref 26.1–32.9)
MCHC RBC AUTO-ENTMCNC: 34.1 G/DL (ref 31.4–35)
MCV RBC AUTO: 88 FL (ref 82–102)
NITRITE UR QL STRIP.AUTO: NEGATIVE
NRBC # BLD: 0 K/UL (ref 0–0.2)
PH UR STRIP: 5.5 (ref 5–9)
PLATELET # BLD AUTO: 245 K/UL (ref 150–450)
PMV BLD AUTO: 10 FL (ref 9.4–12.3)
POTASSIUM SERPL-SCNC: 4.1 MMOL/L (ref 3.5–5.1)
PROT SERPL-MCNC: 7.8 G/DL (ref 6.3–8.2)
PROT UR STRIP-MCNC: NEGATIVE MG/DL
RBC # BLD AUTO: 5.66 M/UL (ref 4.23–5.6)
RBC #/AREA URNS HPF: ABNORMAL /HPF
SODIUM SERPL-SCNC: 136 MMOL/L (ref 133–143)
SP GR UR REFRACTOMETRY: 1.01 (ref 1–1.02)
UROBILINOGEN UR QL STRIP.AUTO: 0.2 EU/DL (ref 0.2–1)
WBC # BLD AUTO: 11.3 K/UL (ref 4.3–11.1)
WBC URNS QL MICRO: ABNORMAL /HPF

## 2023-04-01 PROCEDURE — 51702 INSERT TEMP BLADDER CATH: CPT

## 2023-04-01 PROCEDURE — 99283 EMERGENCY DEPT VISIT LOW MDM: CPT

## 2023-04-01 PROCEDURE — 85027 COMPLETE CBC AUTOMATED: CPT

## 2023-04-01 PROCEDURE — 81001 URINALYSIS AUTO W/SCOPE: CPT

## 2023-04-01 PROCEDURE — 80053 COMPREHEN METABOLIC PANEL: CPT

## 2023-04-01 RX ORDER — TAMSULOSIN HYDROCHLORIDE 0.4 MG/1
0.4 CAPSULE ORAL DAILY
Qty: 30 CAPSULE | Refills: 0 | Status: SHIPPED | OUTPATIENT
Start: 2023-04-01

## 2023-04-01 ASSESSMENT — PAIN DESCRIPTION - LOCATION: LOCATION: ABDOMEN

## 2023-04-01 ASSESSMENT — PAIN - FUNCTIONAL ASSESSMENT: PAIN_FUNCTIONAL_ASSESSMENT: 0-10

## 2023-04-01 ASSESSMENT — ENCOUNTER SYMPTOMS
SHORTNESS OF BREATH: 0
NAUSEA: 0
VOMITING: 0
BACK PAIN: 0
ABDOMINAL PAIN: 1

## 2023-04-01 ASSESSMENT — PAIN SCALES - GENERAL: PAINLEVEL_OUTOF10: 0

## 2023-04-01 NOTE — ED PROVIDER NOTES
Emergency Department Provider Note       PCP: Bandar Whitlock MD   Age: 66 y.o. Sex: male     DISPOSITION Decision To Discharge 04/01/2023 08:40:43 PM       ICD-10-CM    1. Acute urinary retention  R33.8       2. Enlarged prostate  N40.0           Medical Decision Making     Complexity of Problems Addressed:  1 or more acute illnesses that pose a threat to life or bodily function. Data Reviewed and Analyzed:  Category 1:   I independently ordered and reviewed each unique test.  I reviewed external records: provider visit note from outside specialist. -cARDIOLOGY-ELEIQUIS FOR PERSISTANT AFIB      Category 2:       Category 3: Discussion of management or test interpretation. Patient presents with acute urinary retention. He is on Eliquis but denies hematuria. He reports this happened once before while hospitalized for heart attack but denies any chest pain or trouble breathing. He denies any known history of enlarged prostate or prostate infections. We will place an IV and check his kidney function and place a Breen catheter to relieve his urinary retention so as to prevent injury to his kidneys and acute renal failure. The management of this patient was discussed with an external consultant. Dr. Tim Burgess agrees to see in follow-up. He agrees with prescription of Flomax. As long as urine does not appear infected he agrees with no antibiotics as well. Risk of Complications and/or Morbidity of Patient Management:  Prescription drug management performed. Discussion with external consultants. History      Mayo Zamudio is a 66 y.o. male who presents to the Emergency Department with chief complaint of    Chief Complaint   Patient presents with    Urinary Retention      82-year-old male complains of not been able to urinate since 4:00 this morning and complains of lower abdominal pain and fullness that is severe. No radiation of the pain. Pain worse with movement and palpation.     The range of motion. Skin:     General: Skin is warm and dry. Neurological:      Mental Status: He is alert and oriented to person, place, and time. Psychiatric:         Behavior: Behavior normal.        Procedures     Procedures    Orders Placed This Encounter   Procedures    CBC    Comprehensive Metabolic Panel    Urinalysis    Insert mak catheter    Insert peripheral IV        Medications - No data to display    New Prescriptions    TAMSULOSIN (FLOMAX) 0.4 MG CAPSULE    Take 1 capsule by mouth daily        Past Medical History:   Diagnosis Date    Arthritis     Chronic back pain     Colon cancer (HCC)     Partial Colectomy and Chemo.     Depression 2015    managed well with meds    Diabetes ([facility] [address]     oral agent only/pt doesnt monitor BS at home/no s.s of hypoglycemia/    Former cigarette smoker     HTN (hypertension) 2014    controlled well with meds    Hypercholesteremia     controlled well with meds        Past Surgical History:   Procedure Laterality Date    CHOLECYSTECTOMY, LAPAROSCOPIC      COLONOSCOPY      SMALL INTESTINE SURGERY      WISDOM TOOTH EXTRACTION          Social History     Socioeconomic History    Marital status: Single   Tobacco Use    Smoking status: Former     Packs/day: 0.25     Types: Cigarettes     Quit date: 2006     Years since quittin.1    Smokeless tobacco: Never   Substance and Sexual Activity    Alcohol use: No    Drug use: No        Previous Medications    APIXABAN (ELIQUIS) 5 MG TABS TABLET    Take 1 tablet by mouth 2 times daily    ASPIRIN 81 MG EC TABLET    Take 81 mg by mouth    ATORVASTATIN (LIPITOR) 20 MG TABLET    Take 1 tablet by mouth daily    FUROSEMIDE (LASIX) 40 MG TABLET    Take 1 tablet by mouth daily    METFORMIN (GLUCOPHAGE) 500 MG TABLET    Take 500 mg by mouth daily (with breakfast)    METOPROLOL SUCCINATE (TOPROL XL) 100 MG EXTENDED RELEASE TABLET    Take 1 tablet by mouth daily    POTASSIUM CHLORIDE (KLOR-CON M) 20 MEQ EXTENDED

## 2023-04-01 NOTE — ED TRIAGE NOTES
Patient ambulatory to triage with CO unable to  urinate since 0400. Pt hx CHF took extra lasix around 1500 \"because I hadnt peed\" pt very uncomfortable.  Denies known prostate issues

## 2023-04-02 NOTE — DISCHARGE INSTRUCTIONS
Flomax once daily starting tomorrow. Call Dr. Manuela Case on Monday for follow-up appointment this coming week for Breen catheter removal and further outpatient work-up.

## 2023-04-02 NOTE — ED NOTES
I have reviewed discharge instructions with the patient. The patient verbalized understanding. Patient left ED via Discharge Method: ambulatory to Home with self. Opportunity for questions and clarification provided. Patient given 1 scripts. To continue your aftercare when you leave the hospital, you may receive an automated call from our care team to check in on how you are doing. This is a free service and part of our promise to provide the best care and service to meet your aftercare needs.  If you have questions, or wish to unsubscribe from this service please call [phone]. Thank you for Choosing our [facility] Emergency Department.           Radha Cain RN  04/01/23 2052

## 2023-04-06 ENCOUNTER — OFFICE VISIT (OUTPATIENT)
Dept: UROLOGY | Age: 63
End: 2023-04-06
Payer: MEDICARE

## 2023-04-06 DIAGNOSIS — N13.8 BPH WITH OBSTRUCTION/LOWER URINARY TRACT SYMPTOMS: ICD-10-CM

## 2023-04-06 DIAGNOSIS — R31.0 GROSS HEMATURIA: ICD-10-CM

## 2023-04-06 DIAGNOSIS — R33.8 ACUTE URINARY RETENTION: Primary | ICD-10-CM

## 2023-04-06 DIAGNOSIS — N40.1 BPH WITH OBSTRUCTION/LOWER URINARY TRACT SYMPTOMS: ICD-10-CM

## 2023-04-06 PROCEDURE — 3017F COLORECTAL CA SCREEN DOC REV: CPT | Performed by: NURSE PRACTITIONER

## 2023-04-06 PROCEDURE — G8427 DOCREV CUR MEDS BY ELIG CLIN: HCPCS | Performed by: NURSE PRACTITIONER

## 2023-04-06 PROCEDURE — 99204 OFFICE O/P NEW MOD 45 MIN: CPT | Performed by: NURSE PRACTITIONER

## 2023-04-06 PROCEDURE — G8417 CALC BMI ABV UP PARAM F/U: HCPCS | Performed by: NURSE PRACTITIONER

## 2023-04-06 PROCEDURE — 1036F TOBACCO NON-USER: CPT | Performed by: NURSE PRACTITIONER

## 2023-04-06 ASSESSMENT — ENCOUNTER SYMPTOMS
CONSTIPATION: 0
HEARTBURN: 0
BLOOD IN STOOL: 0
DIARRHEA: 0
COUGH: 0
SKIN LESIONS: 0
ABDOMINAL PAIN: 0
VOMITING: 0
WHEEZING: 0
EYE DISCHARGE: 0
INDIGESTION: 0
SHORTNESS OF BREATH: 0
BACK PAIN: 0
EYE PAIN: 0
NAUSEA: 0

## 2023-04-06 NOTE — PROGRESS NOTES
to return to this office for mak catheter placement. Otherwise, continue Flomax 0.4 mg PO daily. RTO in 2 weeks for UA and PVR. Advised to call sooner if unable to urinate, difficulty urinating, UTI sx, or gross hematuria. Will discuss PSA at next visit. Gross hematuria- likely due to Mak cath, enlarged prostate, and Eliquis. Cont to follow. Sherri Calvert is supervising physician today and he approves plan of care.

## 2023-04-20 ENCOUNTER — TELEPHONE (OUTPATIENT)
Dept: UROLOGY | Age: 63
End: 2023-04-20

## 2023-04-20 ENCOUNTER — OFFICE VISIT (OUTPATIENT)
Dept: UROLOGY | Age: 63
End: 2023-04-20
Payer: MEDICARE

## 2023-04-20 DIAGNOSIS — R97.20 ELEVATED PSA: ICD-10-CM

## 2023-04-20 DIAGNOSIS — R33.8 ACUTE URINARY RETENTION: Primary | ICD-10-CM

## 2023-04-20 DIAGNOSIS — N40.1 BPH WITH OBSTRUCTION/LOWER URINARY TRACT SYMPTOMS: ICD-10-CM

## 2023-04-20 DIAGNOSIS — N13.8 BPH WITH OBSTRUCTION/LOWER URINARY TRACT SYMPTOMS: ICD-10-CM

## 2023-04-20 DIAGNOSIS — R31.0 GROSS HEMATURIA: ICD-10-CM

## 2023-04-20 LAB
BILIRUBIN, URINE, POC: NEGATIVE
BLOOD URINE, POC: NEGATIVE
GLUCOSE URINE, POC: NEGATIVE
KETONES, URINE, POC: NEGATIVE
LEUKOCYTE ESTERASE, URINE, POC: NEGATIVE
NITRITE, URINE, POC: NEGATIVE
PH, URINE, POC: 5 (ref 4.6–8)
PROTEIN,URINE, POC: NEGATIVE
PVR, POC: 138 CC
SPECIFIC GRAVITY, URINE, POC: 1.01 (ref 1–1.03)
URINALYSIS CLARITY, POC: NORMAL
URINALYSIS COLOR, POC: NORMAL
UROBILINOGEN, POC: NORMAL

## 2023-04-20 PROCEDURE — 3017F COLORECTAL CA SCREEN DOC REV: CPT | Performed by: NURSE PRACTITIONER

## 2023-04-20 PROCEDURE — G8427 DOCREV CUR MEDS BY ELIG CLIN: HCPCS | Performed by: NURSE PRACTITIONER

## 2023-04-20 PROCEDURE — 51798 US URINE CAPACITY MEASURE: CPT | Performed by: NURSE PRACTITIONER

## 2023-04-20 PROCEDURE — 81003 URINALYSIS AUTO W/O SCOPE: CPT | Performed by: NURSE PRACTITIONER

## 2023-04-20 PROCEDURE — G8417 CALC BMI ABV UP PARAM F/U: HCPCS | Performed by: NURSE PRACTITIONER

## 2023-04-20 PROCEDURE — 99214 OFFICE O/P EST MOD 30 MIN: CPT | Performed by: NURSE PRACTITIONER

## 2023-04-20 PROCEDURE — 1036F TOBACCO NON-USER: CPT | Performed by: NURSE PRACTITIONER

## 2023-04-20 RX ORDER — TAMSULOSIN HYDROCHLORIDE 0.4 MG/1
0.4 CAPSULE ORAL DAILY
Qty: 90 CAPSULE | Refills: 3 | Status: SHIPPED | OUTPATIENT
Start: 2023-04-20

## 2023-04-20 ASSESSMENT — ENCOUNTER SYMPTOMS
NAUSEA: 0
BACK PAIN: 0

## 2023-04-20 NOTE — PROGRESS NOTES
pain.    Urinalysis  UA - Dipstick  Results for orders placed or performed in visit on 04/20/23   AMB POC URINALYSIS DIP STICK AUTO W/O MICRO   Result Value Ref Range    Color (UA POC)      Clarity (UA POC)      Glucose, Urine, POC Negative Negative    Bilirubin, Urine, POC Negative Negative    KETONES, Urine, POC Negative Negative    Specific Gravity, Urine, POC 1.010 1.001 - 1.035    Blood (UA POC) Negative Negative    pH, Urine, POC 5.0 4.6 - 8.0    Protein, Urine, POC Negative Negative    Urobilinogen, POC 0.2 mg/dL     Nitrite, Urine, POC Negative Negative    Leukocyte Esterase, Urine, POC Negative Negative       UA - Micro  WBC - 0  RBC - 0  Bacteria - 0  Epith - 0    PHYSICAL EXAM    General appearance - well appearing and in no distress  Mental status - alert, oriented to person, place, and time  Neck - supple, no significant adenopathy  Chest/Lung-  Quiet, even and easy respiratory effort without use of accessory muscles  Skin - normal coloration and turgor, no rashes      Assessment and Plan    ICD-10-CM    1. Acute urinary retention  R33.8 AMB POC URINALYSIS DIP STICK AUTO W/O MICRO     AMB POC PVR, RICHARD,POST-VOID RES,US,NON-IMAGING      2. BPH with obstruction/lower urinary tract symptoms  N40.1 AMB POC URINALYSIS DIP STICK AUTO W/O MICRO    N13.8 AMB POC PVR, RICHARD,POST-VOID RES,US,NON-IMAGING      3. Gross hematuria  R31.0 AMB POC URINALYSIS DIP STICK AUTO W/O MICRO     AMB POC PVR, RICHARD,POST-VOID RES,US,NON-IMAGING          AUR- PVR low. Resolved. No add tx. BPH/LUTS- cont Flomax 0.4 mg PO daily. Gross hematuria- Resolved. No add f/u warranted. Likely to cath. Elev PSA- will recheck PSA in 3 months w JULIO. Likely elevated d/t recent Breen cath. RTO in 3 months for follow up. PSA prior. Advised to call sooner if sx worsen. Kent Pitcher Dr. Glynis Shone is supervising physician today and he approves plan of care.

## 2023-07-13 ENCOUNTER — NURSE ONLY (OUTPATIENT)
Dept: UROLOGY | Age: 63
End: 2023-07-13

## 2023-07-13 DIAGNOSIS — R97.20 ELEVATED PSA: ICD-10-CM

## 2023-07-13 DIAGNOSIS — N40.1 BPH WITH OBSTRUCTION/LOWER URINARY TRACT SYMPTOMS: ICD-10-CM

## 2023-07-13 DIAGNOSIS — N13.8 BPH WITH OBSTRUCTION/LOWER URINARY TRACT SYMPTOMS: ICD-10-CM

## 2023-07-13 LAB — PSA SERPL-MCNC: 4.7 NG/ML

## 2023-07-20 ENCOUNTER — OFFICE VISIT (OUTPATIENT)
Dept: UROLOGY | Age: 63
End: 2023-07-20
Payer: MEDICARE

## 2023-07-20 DIAGNOSIS — N40.1 BPH WITH OBSTRUCTION/LOWER URINARY TRACT SYMPTOMS: Primary | ICD-10-CM

## 2023-07-20 DIAGNOSIS — R97.20 ELEVATED PSA: ICD-10-CM

## 2023-07-20 DIAGNOSIS — N13.8 BPH WITH OBSTRUCTION/LOWER URINARY TRACT SYMPTOMS: Primary | ICD-10-CM

## 2023-07-20 LAB
BILIRUBIN, URINE, POC: NEGATIVE
BLOOD URINE, POC: NORMAL
GLUCOSE URINE, POC: NEGATIVE
KETONES, URINE, POC: NEGATIVE
LEUKOCYTE ESTERASE, URINE, POC: NEGATIVE
NITRITE, URINE, POC: NEGATIVE
PH, URINE, POC: 6 (ref 4.6–8)
PROTEIN,URINE, POC: NORMAL
SPECIFIC GRAVITY, URINE, POC: 1.02 (ref 1–1.03)
URINALYSIS CLARITY, POC: NORMAL
URINALYSIS COLOR, POC: NORMAL
UROBILINOGEN, POC: NORMAL

## 2023-07-20 PROCEDURE — 99214 OFFICE O/P EST MOD 30 MIN: CPT | Performed by: NURSE PRACTITIONER

## 2023-07-20 PROCEDURE — G8417 CALC BMI ABV UP PARAM F/U: HCPCS | Performed by: NURSE PRACTITIONER

## 2023-07-20 PROCEDURE — 81003 URINALYSIS AUTO W/O SCOPE: CPT | Performed by: NURSE PRACTITIONER

## 2023-07-20 PROCEDURE — G8427 DOCREV CUR MEDS BY ELIG CLIN: HCPCS | Performed by: NURSE PRACTITIONER

## 2023-07-20 PROCEDURE — 3017F COLORECTAL CA SCREEN DOC REV: CPT | Performed by: NURSE PRACTITIONER

## 2023-07-20 PROCEDURE — 1036F TOBACCO NON-USER: CPT | Performed by: NURSE PRACTITIONER

## 2023-07-20 ASSESSMENT — ENCOUNTER SYMPTOMS
NAUSEA: 0
BACK PAIN: 0

## 2023-07-20 NOTE — PROGRESS NOTES
[facility]  [address]  [phone]          Fermin Bar  : 1960    Chief Complaint   Patient presents with    Follow-up          HPI     Fermin Bar is a 66 y.o. male initially referred for urinary retention 23. Seen in the ER for inability to urinate. Breen cath placed. Started on Flomax 0.4 mg PO daily. He has NO prior h/o AUR. He reports LONG h/o weak stream and increased urinary frequency. No h/o BPH. No personal or family h/o urological CA. Former smoker. After Breen cath placement, he has noticed mild gross hematuria. No flank pain, fever/chills, or n/v. Breen cath removed 2 weeks ago. Voiding well since. No recurrence of hematuria.  cc via US (23)     No PSA on file. He reports his PCP did PSA 2023 and it was 4.3. advised this is elevated. I do not have this record. PSA (23) 4.7     PMH of colon CA requiring partial colectomy and chemo. Pt is on Eliquis. Past Medical History:   Diagnosis Date    Arthritis     Chronic back pain     Colon cancer ([address])     Partial Colectomy and Chemo.     Depression 2015    managed well with meds    Diabetes ([address])     oral agent only/pt doesnt monitor BS at home/no s.s of hypoglycemia/    Former cigarette smoker     HTN (hypertension) 2014    controlled well with meds    Hypercholesteremia     controlled well with meds     Past Surgical History:   Procedure Laterality Date    CHOLECYSTECTOMY, LAPAROSCOPIC      COLONOSCOPY      SMALL INTESTINE SURGERY      WISDOM TOOTH EXTRACTION       Current Outpatient Medications   Medication Sig Dispense Refill    tamsulosin (FLOMAX) 0.4 MG capsule Take 1 capsule by mouth daily 90 capsule 3    apixaban (ELIQUIS) 5 MG TABS tablet Take 1 tablet by mouth 2 times daily 180 tablet 3    sacubitril-valsartan (ENTRESTO)  MG per tablet Take 1 tablet by mouth 2 times daily 60 tablet 11    atorvastatin (LIPITOR)

## 2023-07-25 ENCOUNTER — TELEPHONE (OUTPATIENT)
Dept: UROLOGY | Age: 63
End: 2023-07-25

## 2023-09-09 ENCOUNTER — HOSPITAL ENCOUNTER (EMERGENCY)
Age: 63
Discharge: HOME | End: 2023-09-09
Attending: EMERGENCY MEDICINE
Payer: MEDICARE

## 2023-09-09 VITALS
SYSTOLIC BLOOD PRESSURE: 108 MMHG | BODY MASS INDEX: 26.79 KG/M2 | DIASTOLIC BLOOD PRESSURE: 84 MMHG | WEIGHT: 220 LBS | OXYGEN SATURATION: 95 % | HEART RATE: 95 BPM | TEMPERATURE: 97.4 F | HEIGHT: 76 IN | RESPIRATION RATE: 15 BRPM

## 2023-09-09 DIAGNOSIS — R33.8 ACUTE URINARY RETENTION: Primary | ICD-10-CM

## 2023-09-09 LAB
ANION GAP SERPL CALC-SCNC: 3 MMOL/L (ref 2–11)
APPEARANCE UR: CLEAR
BASOPHILS # BLD: 0.1 K/UL (ref 0–0.2)
BASOPHILS NFR BLD: 1 % (ref 0–2)
BILIRUB UR QL: NEGATIVE
BUN SERPL-MCNC: 12 MG/DL (ref 8–23)
CALCIUM SERPL-MCNC: 9.2 MG/DL (ref 8.3–10.4)
CHLORIDE SERPL-SCNC: 109 MMOL/L (ref 101–110)
CO2 SERPL-SCNC: 27 MMOL/L (ref 21–32)
COLOR UR: NORMAL
CREAT SERPL-MCNC: 1.4 MG/DL (ref 0.8–1.5)
DIFFERENTIAL METHOD BLD: ABNORMAL
EOSINOPHIL # BLD: 0.1 K/UL (ref 0–0.8)
EOSINOPHIL NFR BLD: 2 % (ref 0.5–7.8)
ERYTHROCYTE [DISTWIDTH] IN BLOOD BY AUTOMATED COUNT: 13.5 % (ref 11.9–14.6)
GLUCOSE SERPL-MCNC: 131 MG/DL (ref 65–100)
GLUCOSE UR STRIP.AUTO-MCNC: NEGATIVE MG/DL
HCT VFR BLD AUTO: 49 % (ref 41.1–50.3)
HGB BLD-MCNC: 16.7 G/DL (ref 13.6–17.2)
HGB UR QL STRIP: NEGATIVE
IMM GRANULOCYTES # BLD AUTO: 0 K/UL (ref 0–0.5)
IMM GRANULOCYTES NFR BLD AUTO: 0 % (ref 0–5)
KETONES UR QL STRIP.AUTO: NEGATIVE MG/DL
LEUKOCYTE ESTERASE UR QL STRIP.AUTO: NEGATIVE
LYMPHOCYTES # BLD: 2.1 K/UL (ref 0.5–4.6)
LYMPHOCYTES NFR BLD: 33 % (ref 13–44)
MCH RBC QN AUTO: 29.6 PG (ref 26.1–32.9)
MCHC RBC AUTO-ENTMCNC: 34.1 G/DL (ref 31.4–35)
MCV RBC AUTO: 86.7 FL (ref 82–102)
MONOCYTES # BLD: 0.4 K/UL (ref 0.1–1.3)
MONOCYTES NFR BLD: 6 % (ref 4–12)
NEUTS SEG # BLD: 3.7 K/UL (ref 1.7–8.2)
NEUTS SEG NFR BLD: 58 % (ref 43–78)
NITRITE UR QL STRIP.AUTO: NEGATIVE
NRBC # BLD: 0 K/UL (ref 0–0.2)
PH UR STRIP: 5.5 (ref 5–9)
PLATELET # BLD AUTO: 204 K/UL (ref 150–450)
PMV BLD AUTO: 10 FL (ref 9.4–12.3)
POTASSIUM SERPL-SCNC: 4.4 MMOL/L (ref 3.5–5.1)
PROT UR STRIP-MCNC: NEGATIVE MG/DL
RBC # BLD AUTO: 5.65 M/UL (ref 4.23–5.6)
SODIUM SERPL-SCNC: 139 MMOL/L (ref 133–143)
SP GR UR REFRACTOMETRY: 1.01 (ref 1–1.02)
UROBILINOGEN UR QL STRIP.AUTO: 0.2 EU/DL (ref 0.2–1)
WBC # BLD AUTO: 6.5 K/UL (ref 4.3–11.1)

## 2023-09-09 PROCEDURE — 99283 EMERGENCY DEPT VISIT LOW MDM: CPT

## 2023-09-09 PROCEDURE — 6370000000 HC RX 637 (ALT 250 FOR IP): Performed by: EMERGENCY MEDICINE

## 2023-09-09 PROCEDURE — 51702 INSERT TEMP BLADDER CATH: CPT

## 2023-09-09 PROCEDURE — 81003 URINALYSIS AUTO W/O SCOPE: CPT

## 2023-09-09 PROCEDURE — 80048 BASIC METABOLIC PNL TOTAL CA: CPT

## 2023-09-09 PROCEDURE — 85025 COMPLETE CBC W/AUTO DIFF WBC: CPT

## 2023-09-09 RX ORDER — LIDOCAINE HYDROCHLORIDE 20 MG/ML
JELLY TOPICAL
Status: COMPLETED | OUTPATIENT
Start: 2023-09-09 | End: 2023-09-09

## 2023-09-09 RX ADMIN — LIDOCAINE HYDROCHLORIDE: 20 JELLY TOPICAL at 09:16

## 2023-09-09 ASSESSMENT — LIFESTYLE VARIABLES
HOW OFTEN DO YOU HAVE A DRINK CONTAINING ALCOHOL: NEVER
HOW MANY STANDARD DRINKS CONTAINING ALCOHOL DO YOU HAVE ON A TYPICAL DAY: PATIENT DOES NOT DRINK

## 2023-09-09 ASSESSMENT — PAIN SCALES - GENERAL: PAINLEVEL_OUTOF10: 10

## 2023-09-09 NOTE — DISCHARGE INSTRUCTIONS
Contact urology for follow-up  Breen catheter in place until urology follows probably towards the end of next week  No evidence of infection  No evidence of blood in urine or clots  Return any point with worsening/failure of catheter to drain/fever or other changes

## 2023-09-09 NOTE — ED PROVIDER NOTES
Emergency Department Provider Note       PCP: Avinash Sy MD   Age: 66 y.o. Sex: male     DISPOSITION       No diagnosis found. Medical Decision Making     Complexity of Problems Addressed:  Urinary retention requiring Breen catheter    Data Reviewed and Analyzed:  I independently ordered and reviewed each unique test.         I interpreted the examination of the lab work and urinalysis shows no acute changes no hematuria or infection type changes. .    Discussion of management or test interpretation. Patient on Eliquis but no evidence of blood related cause of obstruction such as clots. Patient additionally with no evidence of urinary tract infection. Patient has resolution of symptoms with Breen catheter placement. Risk of Complications and/or Morbidity of Patient Management:  Patient with need to have Breen remain in place having about 13 to 1400 cc of retention at the time of Breen catheter placement. He is already on Flomax and will continue this. Patient is to follow-up with his regular urology office. History       History of acute urinary retention comes our department with awaking at 4:00 this morning with a sense of urgency to void had been been unable to void since that time. He did not produce any urine even at that sense of urgency. No fevers or chills. History of similar in the past.  He had done fairly well he is on Flomax at this time. The patient [facility]. No other present complaint or change in meds or other issues of concern    The history is provided by the patient. Review of Systems   Constitutional:  Negative for chills and fever. HENT: Negative. Genitourinary:  Positive for difficulty urinating. Negative for flank pain and hematuria. Physical Exam     Vitals signs and nursing note reviewed: There were no vitals filed for this visit. Physical Exam  Constitutional:       General: He is not in acute distress.      Appearance:

## 2023-09-12 ENCOUNTER — OFFICE VISIT (OUTPATIENT)
Dept: UROLOGY | Age: 63
End: 2023-09-12
Payer: MEDICARE

## 2023-09-12 DIAGNOSIS — R97.20 ELEVATED PSA: ICD-10-CM

## 2023-09-12 DIAGNOSIS — R33.8 ACUTE URINARY RETENTION: Primary | ICD-10-CM

## 2023-09-12 DIAGNOSIS — N40.1 BPH WITH OBSTRUCTION/LOWER URINARY TRACT SYMPTOMS: ICD-10-CM

## 2023-09-12 DIAGNOSIS — N13.8 BPH WITH OBSTRUCTION/LOWER URINARY TRACT SYMPTOMS: ICD-10-CM

## 2023-09-12 PROCEDURE — 99214 OFFICE O/P EST MOD 30 MIN: CPT | Performed by: NURSE PRACTITIONER

## 2023-09-12 PROCEDURE — G8427 DOCREV CUR MEDS BY ELIG CLIN: HCPCS | Performed by: NURSE PRACTITIONER

## 2023-09-12 PROCEDURE — G8417 CALC BMI ABV UP PARAM F/U: HCPCS | Performed by: NURSE PRACTITIONER

## 2023-09-12 PROCEDURE — 3017F COLORECTAL CA SCREEN DOC REV: CPT | Performed by: NURSE PRACTITIONER

## 2023-09-12 PROCEDURE — 1036F TOBACCO NON-USER: CPT | Performed by: NURSE PRACTITIONER

## 2023-09-12 ASSESSMENT — ENCOUNTER SYMPTOMS
NAUSEA: 0
BACK PAIN: 0

## 2023-09-12 NOTE — PROGRESS NOTES
sacubitril-valsartan (ENTRESTO)  MG per tablet Take 1 tablet by mouth 2 times daily 60 tablet 11    atorvastatin (LIPITOR) 20 MG tablet Take 1 tablet by mouth daily 90 tablet 3    furosemide (LASIX) 40 MG tablet Take 1 tablet by mouth daily 90 tablet 3    metoprolol succinate (TOPROL XL) 100 MG extended release tablet Take 1 tablet by mouth daily 90 tablet 3    potassium chloride (KLOR-CON M) 20 MEQ extended release tablet Take 1 tablet by mouth daily 90 tablet 3    aspirin 81 MG EC tablet Take 1 tablet by mouth      metFORMIN (GLUCOPHAGE) 500 MG tablet Take 1 tablet by mouth daily (with breakfast)       No current facility-administered medications for this visit. No Known Allergies  Social History     Socioeconomic History    Marital status: Single     Spouse name: Not on file    Number of children: Not on file    Years of education: Not on file    Highest education level: Not on file   Occupational History    Not on file   Tobacco Use    Smoking status: Former     Packs/day: .25     Types: Cigarettes     Quit date: 2006     Years since quittin.5    Smokeless tobacco: Never   Substance and Sexual Activity    Alcohol use: No    Drug use: No    Sexual activity: Not on file   Other Topics Concern    Not on file   Social History Narrative    Not on file     Social Determinants of Health     Financial Resource Strain: Not on file   Food Insecurity: Not on file   Transportation Needs: Not on file   Physical Activity: Not on file   Stress: Not on file   Social Connections: Not on file   Intimate Partner Violence: Not on file   Housing Stability: Not on file     Family History   Problem Relation Age of Onset    Heart Disease Neg Hx     Cancer Mother         Pancreatic    No Known Problems Brother     No Known Problems Father     No Known Problems Sister     Diabetes Brother     No Known Problems Sister     Heart Attack Brother        Review of Systems  Constitutional:   Negative for fever.   GI:

## 2023-09-15 ENCOUNTER — TELEPHONE (OUTPATIENT)
Dept: UROLOGY | Age: 63
End: 2023-09-15

## 2023-09-15 ENCOUNTER — PROCEDURE VISIT (OUTPATIENT)
Dept: UROLOGY | Age: 63
End: 2023-09-15

## 2023-09-15 DIAGNOSIS — R33.8 ACUTE URINARY RETENTION: ICD-10-CM

## 2023-09-15 DIAGNOSIS — N13.8 BPH WITH OBSTRUCTION/LOWER URINARY TRACT SYMPTOMS: ICD-10-CM

## 2023-09-15 DIAGNOSIS — N13.8 BPH WITH OBSTRUCTION/LOWER URINARY TRACT SYMPTOMS: Primary | ICD-10-CM

## 2023-09-15 DIAGNOSIS — N40.1 BPH WITH OBSTRUCTION/LOWER URINARY TRACT SYMPTOMS: Primary | ICD-10-CM

## 2023-09-15 DIAGNOSIS — R97.20 ELEVATED PSA: ICD-10-CM

## 2023-09-15 DIAGNOSIS — N40.1 BPH WITH OBSTRUCTION/LOWER URINARY TRACT SYMPTOMS: ICD-10-CM

## 2023-09-15 LAB
BILIRUBIN, URINE, POC: NEGATIVE
BLOOD URINE, POC: NORMAL
GLUCOSE URINE, POC: NEGATIVE
KETONES, URINE, POC: NEGATIVE
LEUKOCYTE ESTERASE, URINE, POC: NORMAL
NITRITE, URINE, POC: NEGATIVE
PH, URINE, POC: 5.5 (ref 4.6–8)
PROTEIN,URINE, POC: NEGATIVE
SPECIFIC GRAVITY, URINE, POC: 1.01 (ref 1–1.03)
URINALYSIS CLARITY, POC: NORMAL
URINALYSIS COLOR, POC: NORMAL
UROBILINOGEN, POC: NORMAL

## 2023-09-15 RX ORDER — FINASTERIDE 5 MG/1
5 TABLET, FILM COATED ORAL DAILY
Qty: 90 TABLET | Refills: 3 | Status: SHIPPED | OUTPATIENT
Start: 2023-09-15

## 2023-09-15 NOTE — PROGRESS NOTES
90 capsule 3    apixaban (ELIQUIS) 5 MG TABS tablet Take 1 tablet by mouth 2 times daily 180 tablet 3    sacubitril-valsartan (ENTRESTO)  MG per tablet Take 1 tablet by mouth 2 times daily 60 tablet 11    atorvastatin (LIPITOR) 20 MG tablet Take 1 tablet by mouth daily 90 tablet 3    furosemide (LASIX) 40 MG tablet Take 1 tablet by mouth daily 90 tablet 3    metoprolol succinate (TOPROL XL) 100 MG extended release tablet Take 1 tablet by mouth daily 90 tablet 3    potassium chloride (KLOR-CON M) 20 MEQ extended release tablet Take 1 tablet by mouth daily 90 tablet 3    aspirin 81 MG EC tablet Take 1 tablet by mouth      metFORMIN (GLUCOPHAGE) 500 MG tablet Take 1 tablet by mouth daily (with breakfast)       No current facility-administered medications for this visit.      No Known Allergies  Social History     Socioeconomic History    Marital status: Single     Spouse name: Not on file    Number of children: Not on file    Years of education: Not on file    Highest education level: Not on file   Occupational History    Not on file   Tobacco Use    Smoking status: Former     Packs/day: .25     Types: Cigarettes     Quit date: 2006     Years since quittin.5    Smokeless tobacco: Never   Substance and Sexual Activity    Alcohol use: No    Drug use: No    Sexual activity: Not on file   Other Topics Concern    Not on file   Social History Narrative    Not on file     Social Determinants of Health     Financial Resource Strain: Not on file   Food Insecurity: Not on file   Transportation Needs: Not on file   Physical Activity: Not on file   Stress: Not on file   Social Connections: Not on file   Intimate Partner Violence: Not on file   Housing Stability: Not on file     Family History   Problem Relation Age of Onset    Heart Disease Neg Hx     Cancer Mother         Pancreatic    No Known Problems Brother     No Known Problems Father     No Known Problems Sister     Diabetes Brother     No Known Problems

## 2023-09-15 NOTE — TELEPHONE ENCOUNTER
----- Message from Damaris West MD sent at 9/15/2023  9:44 AM EDT -----  Regardin Dexter: Andrzej Hogan    Surgeon: Sully Gonzales    Assist: NONE    Diagnosis: BPH/LUTS    Procedure: Cystoscopy, bipolar trans-urethral resection of prostate    Posting time: 90 minutes    Special Instruments Needed:  NONE    Anesthesia: GENERAL    Labs: CBC, BMP, U/A    Tests: EKG per anesthesia    Blood: NONE    Bowel Prep: NONE    Special Instructions:     Orders/HandP:     Follow up appointment: KEDAR

## 2023-09-20 ENCOUNTER — TELEPHONE (OUTPATIENT)
Dept: UROLOGY | Age: 63
End: 2023-09-20

## 2023-09-20 PROBLEM — N40.1 BPH WITH OBSTRUCTION/LOWER URINARY TRACT SYMPTOMS: Status: ACTIVE | Noted: 2023-09-15

## 2023-09-20 PROBLEM — N13.8 BPH WITH OBSTRUCTION/LOWER URINARY TRACT SYMPTOMS: Status: ACTIVE | Noted: 2023-09-15

## 2023-09-20 NOTE — TELEPHONE ENCOUNTER
Procedures: Procedure(s):   CYSTOSCOPY, BIPOLAR TRANSURETHRAL RESECTION PROSTATE   Date: 11/9/2023   Time: 0800   Location: [facility] MAIN OR 01 CYSTO     Scheduled. Please complete orders.

## 2023-09-20 NOTE — TELEPHONE ENCOUNTER
Called and discussed dates with the pt. Request sent to scheduling for [facility] 11/9/23.

## 2023-09-20 NOTE — TELEPHONE ENCOUNTER
\"He was seen in February, supposed to be on Eliquis with his afib, not aspirin. He is low risk to hold either as requested but he really needs to get back on Eliquis long term for afib with elevated CHADS VASC score. \"    Tamia Sanchez MD.

## 2023-09-20 NOTE — TELEPHONE ENCOUNTER
Cardiac Pre-operative Assessment      Physician or Practice Requesting Medication Clearance or Risk Assessment: Dr. Grey Pac: Maryse Garces Phone Number: [phone]    Fax Number: [phone]    Date of Surgery/Procedure: 11/9/23    Type of Surgery or Procedure: TURP      Medications to hold Eliquis, Aspirin    # Days pre-procedure to hold 3 days    Restart medication __________    Risk assessment:High     Please respond back in this telephone encounter. Thank you.

## 2023-09-25 ENCOUNTER — HOSPITAL ENCOUNTER (EMERGENCY)
Age: 63
Discharge: HOME | End: 2023-09-25
Attending: EMERGENCY MEDICINE | Admitting: SURGERY
Payer: MEDICARE

## 2023-09-25 VITALS
TEMPERATURE: 97.9 F | OXYGEN SATURATION: 99 % | BODY MASS INDEX: 26.79 KG/M2 | RESPIRATION RATE: 24 BRPM | WEIGHT: 220 LBS | DIASTOLIC BLOOD PRESSURE: 69 MMHG | HEIGHT: 76 IN | HEART RATE: 79 BPM | SYSTOLIC BLOOD PRESSURE: 118 MMHG

## 2023-09-25 DIAGNOSIS — R33.9 URINARY RETENTION: Primary | ICD-10-CM

## 2023-09-25 LAB
APPEARANCE UR: CLEAR
BACTERIA URNS QL MICRO: ABNORMAL /HPF
BILIRUB UR QL: NEGATIVE
BILIRUB UR QL: NEGATIVE
CASTS URNS QL MICRO: ABNORMAL /LPF
COLOR UR: ABNORMAL
EPI CELLS #/AREA URNS HPF: ABNORMAL /HPF
GLUCOSE UR QL STRIP.AUTO: NEGATIVE MG/DL
GLUCOSE UR STRIP.AUTO-MCNC: NEGATIVE MG/DL
HGB UR QL STRIP: ABNORMAL
KETONES UR QL STRIP.AUTO: NEGATIVE MG/DL
KETONES UR-MCNC: NEGATIVE MG/DL
LEUKOCYTE ESTERASE UR QL STRIP.AUTO: ABNORMAL
LEUKOCYTE ESTERASE UR QL STRIP: ABNORMAL
NITRITE UR QL STRIP.AUTO: NEGATIVE
NITRITE UR QL: NEGATIVE
PH UR STRIP: 6.5 (ref 5–9)
PH UR: 7 (ref 5–9)
PROT UR QL: NEGATIVE MG/DL
PROT UR STRIP-MCNC: NEGATIVE MG/DL
RBC # UR STRIP: ABNORMAL
RBC #/AREA URNS HPF: ABNORMAL /HPF
SERVICE CMNT-IMP: ABNORMAL
SP GR UR REFRACTOMETRY: <1.005 (ref 1–1.02)
SP GR UR: 1.01 (ref 1–1.02)
UROBILINOGEN UR QL STRIP.AUTO: 0.2 EU/DL (ref 0.2–1)
UROBILINOGEN UR QL: 0.2 EU/DL (ref 0.2–1)
WBC URNS QL MICRO: ABNORMAL /HPF

## 2023-09-25 PROCEDURE — 99283 EMERGENCY DEPT VISIT LOW MDM: CPT

## 2023-09-25 PROCEDURE — 81001 URINALYSIS AUTO W/SCOPE: CPT

## 2023-09-25 PROCEDURE — 87186 SC STD MICRODIL/AGAR DIL: CPT

## 2023-09-25 PROCEDURE — 81003 URINALYSIS AUTO W/O SCOPE: CPT

## 2023-09-25 PROCEDURE — 87088 URINE BACTERIA CULTURE: CPT

## 2023-09-25 PROCEDURE — 87086 URINE CULTURE/COLONY COUNT: CPT

## 2023-09-25 ASSESSMENT — PAIN DESCRIPTION - LOCATION: LOCATION: PELVIS

## 2023-09-25 ASSESSMENT — PAIN SCALES - GENERAL: PAINLEVEL_OUTOF10: 10

## 2023-09-25 ASSESSMENT — ENCOUNTER SYMPTOMS: ABDOMINAL PAIN: 1

## 2023-09-25 ASSESSMENT — PAIN DESCRIPTION - DESCRIPTORS: DESCRIPTORS: PRESSURE;DISCOMFORT

## 2023-09-25 ASSESSMENT — PAIN - FUNCTIONAL ASSESSMENT: PAIN_FUNCTIONAL_ASSESSMENT: 0-10

## 2023-09-25 NOTE — ED TRIAGE NOTES
Pt arrives to ed with c/o urinary retention since 0200 am. Prostate surgery in 2 weeks. Hx of 2 catheters in past year.

## 2023-09-25 NOTE — DISCHARGE INSTRUCTIONS
Follow-up with your urologist this week for repeat evaluation and discussion of when catheter should be removed.

## 2023-09-27 LAB
BACTERIA SPEC CULT: ABNORMAL
SERVICE CMNT-IMP: ABNORMAL

## 2023-09-27 NOTE — PROGRESS NOTES
ED pharmacist reviewed recent results of urine culture. Based on culture results, the patient requires alternative antimicrobial therapy. Discussed case with Dr. Elaine Russell. A prescription for nitrofurantion 100mg BID x5d has been called into [facility] on 9/27 per Dr. Elaine Russell. The patient has been advised to follow-up with their primary care provider or return to the ED if symptoms do not resolve or worsen. Allergies as of 09/25/2023    (No Known Allergies)     Meryl Ozuna PharmD.   Emergency Medicine Clinical Pharmacist

## 2023-09-29 ENCOUNTER — HOSPITAL ENCOUNTER (EMERGENCY)
Age: 63
Discharge: HOME | End: 2023-09-29
Attending: EMERGENCY MEDICINE
Payer: MEDICARE

## 2023-09-29 VITALS
RESPIRATION RATE: 19 BRPM | HEART RATE: 75 BPM | BODY MASS INDEX: 26.79 KG/M2 | HEIGHT: 76 IN | WEIGHT: 220 LBS | TEMPERATURE: 97.6 F | OXYGEN SATURATION: 96 % | DIASTOLIC BLOOD PRESSURE: 99 MMHG | SYSTOLIC BLOOD PRESSURE: 131 MMHG

## 2023-09-29 DIAGNOSIS — E87.6 HYPOKALEMIA: ICD-10-CM

## 2023-09-29 DIAGNOSIS — K92.2 LOWER GI BLEED: Primary | ICD-10-CM

## 2023-09-29 LAB
ABO + RH BLD: NORMAL
ALBUMIN SERPL-MCNC: 3.8 G/DL (ref 3.2–4.6)
ALBUMIN/GLOB SERPL: 1 (ref 0.4–1.6)
ALP SERPL-CCNC: 89 U/L (ref 50–136)
ALT SERPL-CCNC: 18 U/L (ref 12–65)
ANION GAP SERPL CALC-SCNC: 8 MMOL/L (ref 2–11)
AST SERPL-CCNC: 14 U/L (ref 15–37)
BASOPHILS # BLD: 0.1 K/UL (ref 0–0.2)
BASOPHILS NFR BLD: 1 % (ref 0–2)
BILIRUB SERPL-MCNC: 0.6 MG/DL (ref 0.2–1.1)
BLOOD GROUP ANTIBODIES SERPL: NORMAL
BUN SERPL-MCNC: 13 MG/DL (ref 8–23)
CALCIUM SERPL-MCNC: 9.2 MG/DL (ref 8.3–10.4)
CHLORIDE SERPL-SCNC: 107 MMOL/L (ref 101–110)
CO2 SERPL-SCNC: 27 MMOL/L (ref 21–32)
CREAT SERPL-MCNC: 1.3 MG/DL (ref 0.8–1.5)
DIFFERENTIAL METHOD BLD: NORMAL
EOSINOPHIL # BLD: 0.1 K/UL (ref 0–0.8)
EOSINOPHIL NFR BLD: 1 % (ref 0.5–7.8)
ERYTHROCYTE [DISTWIDTH] IN BLOOD BY AUTOMATED COUNT: 13.6 % (ref 11.9–14.6)
GLOBULIN SER CALC-MCNC: 3.7 G/DL (ref 2.8–4.5)
GLUCOSE SERPL-MCNC: 135 MG/DL (ref 65–100)
HCT VFR BLD AUTO: 46.3 % (ref 41.1–50.3)
HGB BLD-MCNC: 15.5 G/DL (ref 13.6–17.2)
IMM GRANULOCYTES # BLD AUTO: 0 K/UL (ref 0–0.5)
IMM GRANULOCYTES NFR BLD AUTO: 0 % (ref 0–5)
INR PPP: 1.1
LYMPHOCYTES # BLD: 2.3 K/UL (ref 0.5–4.6)
LYMPHOCYTES NFR BLD: 23 % (ref 13–44)
MCH RBC QN AUTO: 29.2 PG (ref 26.1–32.9)
MCHC RBC AUTO-ENTMCNC: 33.5 G/DL (ref 31.4–35)
MCV RBC AUTO: 87.2 FL (ref 82–102)
MONOCYTES # BLD: 0.8 K/UL (ref 0.1–1.3)
MONOCYTES NFR BLD: 8 % (ref 4–12)
NEUTS SEG # BLD: 6.9 K/UL (ref 1.7–8.2)
NEUTS SEG NFR BLD: 67 % (ref 43–78)
NRBC # BLD: 0 K/UL (ref 0–0.2)
PLATELET # BLD AUTO: 286 K/UL (ref 150–450)
PMV BLD AUTO: 9.8 FL (ref 9.4–12.3)
POTASSIUM SERPL-SCNC: 3.3 MMOL/L (ref 3.5–5.1)
PROT SERPL-MCNC: 7.5 G/DL (ref 6.3–8.2)
PROTHROMBIN TIME: 14.9 SEC (ref 12.6–14.3)
RBC # BLD AUTO: 5.31 M/UL (ref 4.23–5.6)
SODIUM SERPL-SCNC: 142 MMOL/L (ref 133–143)
SPECIMEN EXP DATE BLD: NORMAL
WBC # BLD AUTO: 10.3 K/UL (ref 4.3–11.1)

## 2023-09-29 PROCEDURE — 99284 EMERGENCY DEPT VISIT MOD MDM: CPT

## 2023-09-29 PROCEDURE — 85025 COMPLETE CBC W/AUTO DIFF WBC: CPT

## 2023-09-29 PROCEDURE — 86850 RBC ANTIBODY SCREEN: CPT

## 2023-09-29 PROCEDURE — 80053 COMPREHEN METABOLIC PANEL: CPT

## 2023-09-29 PROCEDURE — 85610 PROTHROMBIN TIME: CPT

## 2023-09-29 PROCEDURE — 6370000000 HC RX 637 (ALT 250 FOR IP): Performed by: EMERGENCY MEDICINE

## 2023-09-29 PROCEDURE — 86900 BLOOD TYPING SEROLOGIC ABO: CPT

## 2023-09-29 PROCEDURE — 86901 BLOOD TYPING SEROLOGIC RH(D): CPT

## 2023-09-29 RX ORDER — POTASSIUM CHLORIDE 20 MEQ/1
40 TABLET, EXTENDED RELEASE ORAL
Status: COMPLETED | OUTPATIENT
Start: 2023-09-29 | End: 2023-09-29

## 2023-09-29 RX ADMIN — POTASSIUM CHLORIDE 40 MEQ: 1500 TABLET, EXTENDED RELEASE ORAL at 02:05

## 2023-09-29 ASSESSMENT — PAIN - FUNCTIONAL ASSESSMENT: PAIN_FUNCTIONAL_ASSESSMENT: 0-10

## 2023-09-29 ASSESSMENT — PAIN SCALES - GENERAL: PAINLEVEL_OUTOF10: 10

## 2023-09-29 NOTE — DISCHARGE INSTRUCTIONS
Please stop taking her Eliquis for 3 days. Restart it as usual after that. Please return to the emergency department if you develop new bleeding.

## 2023-09-29 NOTE — ED TRIAGE NOTES
Pt to ED with c/o diarrhea, rectal pain, and blood in stool. Pt states blood started tonight at 2100. Pt states large amount of red blood in the toilet. Pt states takes eliquis daily. Pt states rectal pain since Monday. Pt states previous GI bleed in the 90s. Pt alert ambulatory and in no acute pain at this time.

## 2023-09-29 NOTE — ED PROVIDER NOTES
Emergency Department Provider Note       PCP: Sherrill Palomares MD   Age: 66 y.o. Sex: male     DISPOSITION    Discharge       ICD-10-CM    1. Lower GI bleed  K92.2           Medical Decision Making     Complexity of Problems Addressed:  1 or more acute illnesses that pose a threat to life or bodily function. Data Reviewed and Analyzed:   I independently ordered and reviewed each unique test.  I reviewed external records: provider visit note from outside specialist.  I reviewed external records: previous lab results from outside ED. I reviewed external records: previous imaging study including radiologist interpretation. I independently ordered and interpreted the ED EKG in the absence of a Cardiologist.    Rate: 91  EKG Interpretation: EKG Interpretation: atrial fibrillation  ST Segments: Normal ST segments - NO STEMI  I independently interpreted the cardiac monitor rhythm strip atrial fibrillation. Discussion of management or test interpretation. Had long discussion with patient I advised that he be admitted for GI bleed given that he was on the Eliquis. He refused to be admitted. Understands the risks of further GI bleed. he has not bled for several hours now and has normal vital signs with a hemoglobin of 15.5. He does agree to return if the symptoms worsen. I did advise him to stop taking Eliquis for 3 days. He takes this for atrial fibrillation. And he states his cardiologist have cleared him to be off of it for 1 week during a prostate surgery. Currently I feel the risk of taking Eliquis outweighs the benefit of any stroke reduction for 3 days. Risk of Complications and/or Morbidity of Patient Management:  Patient was discharged risks and benefits of hospitalization were considered. Shared medical decision making was utilized in creating the patients health plan today. Is this patient to be included in the SEP-1 core measure due to severe sepsis or septic shock?  No

## 2023-10-02 ENCOUNTER — TELEPHONE (OUTPATIENT)
Dept: UROLOGY | Age: 63
End: 2023-10-02

## 2023-10-02 NOTE — TELEPHONE ENCOUNTER
Patient went to ED on 9/29 for AUR, catheter still in.   Patient called, wants to know what he should do next, does he need to see you, or just have the catheter removed in office for voiding trial?

## 2023-10-04 NOTE — TELEPHONE ENCOUNTER
Called patient, he would like to schedule to have cath exchanged. He can't come the week of Oct 25, scheduled him for 10/20, he will call and let us know if this date doesn't work.

## 2023-10-20 ENCOUNTER — NURSE ONLY (OUTPATIENT)
Dept: UROLOGY | Age: 63
End: 2023-10-20
Payer: MEDICARE

## 2023-10-20 DIAGNOSIS — R33.8 ACUTE URINARY RETENTION: Primary | ICD-10-CM

## 2023-10-20 PROCEDURE — 51702 INSERT TEMP BLADDER CATH: CPT | Performed by: UROLOGY

## 2023-10-20 NOTE — PROGRESS NOTES
Pt came in for 16 fr coude mak catheter change. Pt was placed in a supine position, and tolerated procedure well. Catheter started draining immediately when it was placed.

## 2023-11-03 ENCOUNTER — TELEPHONE (OUTPATIENT)
Dept: SURGERY | Age: 63
End: 2023-11-03

## 2023-11-03 ENCOUNTER — TELEPHONE (OUTPATIENT)
Dept: UROLOGY | Age: 63
End: 2023-11-03

## 2023-11-03 ENCOUNTER — HOSPITAL ENCOUNTER (OUTPATIENT)
Dept: SURGERY | Age: 63
End: 2023-11-03
Payer: MEDICARE

## 2023-11-03 VITALS
SYSTOLIC BLOOD PRESSURE: 129 MMHG | WEIGHT: 220.2 LBS | HEIGHT: 76 IN | OXYGEN SATURATION: 98 % | DIASTOLIC BLOOD PRESSURE: 92 MMHG | BODY MASS INDEX: 26.81 KG/M2 | HEART RATE: 76 BPM | RESPIRATION RATE: 17 BRPM | TEMPERATURE: 97.5 F

## 2023-11-03 DIAGNOSIS — N30.00 ACUTE CYSTITIS WITHOUT HEMATURIA: Primary | ICD-10-CM

## 2023-11-03 LAB
ANION GAP SERPL CALC-SCNC: 6 MMOL/L (ref 2–11)
APPEARANCE UR: ABNORMAL
BACTERIA URNS QL MICRO: ABNORMAL /HPF
BILIRUB UR QL: NEGATIVE
BUN SERPL-MCNC: 12 MG/DL (ref 8–23)
CALCIUM SERPL-MCNC: 9.2 MG/DL (ref 8.3–10.4)
CHLORIDE SERPL-SCNC: 105 MMOL/L (ref 101–110)
CO2 SERPL-SCNC: 29 MMOL/L (ref 21–32)
COLOR UR: ABNORMAL
CREAT SERPL-MCNC: 1.18 MG/DL (ref 0.8–1.5)
EPI CELLS #/AREA URNS HPF: ABNORMAL /HPF
ERYTHROCYTE [DISTWIDTH] IN BLOOD BY AUTOMATED COUNT: 13.9 % (ref 11.9–14.6)
GLUCOSE SERPL-MCNC: 131 MG/DL (ref 65–100)
GLUCOSE UR STRIP.AUTO-MCNC: NEGATIVE MG/DL
HCT VFR BLD AUTO: 49.2 % (ref 41.1–50.3)
HGB BLD-MCNC: 15.7 G/DL (ref 13.6–17.2)
HGB UR QL STRIP: ABNORMAL
KETONES UR QL STRIP.AUTO: NEGATIVE MG/DL
LEUKOCYTE ESTERASE UR QL STRIP.AUTO: ABNORMAL
MCH RBC QN AUTO: 28.3 PG (ref 26.1–32.9)
MCHC RBC AUTO-ENTMCNC: 31.9 G/DL (ref 31.4–35)
MCV RBC AUTO: 88.6 FL (ref 82–102)
NITRITE UR QL STRIP.AUTO: POSITIVE
NRBC # BLD: 0 K/UL (ref 0–0.2)
PH UR STRIP: 5 (ref 5–9)
PLATELET # BLD AUTO: 254 K/UL (ref 150–450)
PMV BLD AUTO: 10 FL (ref 9.4–12.3)
POTASSIUM SERPL-SCNC: 4.3 MMOL/L (ref 3.5–5.1)
PROT UR STRIP-MCNC: NEGATIVE MG/DL
RBC # BLD AUTO: 5.55 M/UL (ref 4.23–5.6)
RBC #/AREA URNS HPF: ABNORMAL /HPF
SODIUM SERPL-SCNC: 140 MMOL/L (ref 133–143)
SP GR UR REFRACTOMETRY: 1.01 (ref 1–1.02)
UROBILINOGEN UR QL STRIP.AUTO: 0.2 EU/DL (ref 0.2–1)
WBC # BLD AUTO: 7.6 K/UL (ref 4.3–11.1)
WBC URNS QL MICRO: ABNORMAL /HPF

## 2023-11-03 PROCEDURE — 81001 URINALYSIS AUTO W/SCOPE: CPT

## 2023-11-03 PROCEDURE — 80048 BASIC METABOLIC PNL TOTAL CA: CPT

## 2023-11-03 PROCEDURE — 85027 COMPLETE CBC AUTOMATED: CPT

## 2023-11-03 RX ORDER — SULFAMETHOXAZOLE AND TRIMETHOPRIM 800; 160 MG/1; MG/1
1 TABLET ORAL 2 TIMES DAILY
Qty: 14 TABLET | Refills: 0 | Status: SHIPPED | OUTPATIENT
Start: 2023-11-03 | End: 2023-11-10

## 2023-11-03 ASSESSMENT — PAIN SCALES - GENERAL: PAINLEVEL_OUTOF10: 0

## 2023-11-03 NOTE — PERIOP NOTE
PLEASE CONTINUE TAKING ALL PRESCRIPTION MEDICATIONS UP TO THE DAY OF SURGERY UNLESS OTHERWISE DIRECTED BELOW. You may take Tylenol, allergy,  and/or indigestion medications. TAKE ONLY THESE MEDICATIONS ON THE DAY OF SURGERY   Atorvastatin (Lipitor)   Finasteride (Proscar)   Metoprolol (Toprol)  Entresto  Tamsulosin (Flomax)       DISCONTINUE all vitamins and supplements 7 days prior to surgery. DISCONTINUE Non-Steroidal Anti-Inflammatory (NSAIDS) such as Advil and Aleve 5 days prior to surgery. Home Medications to Hold- please continue all other medications except these. Potassium and Lasix (PLEASE HOLD DAY OF SURGERY)   Please hold Eliquis as instructed by surgeon. Comments      On the day before surgery please take 2 Tylenol in the morning and then again before bed. You may use either regular or extra strength. Please do not bring home medications with you on the day of surgery unless otherwise directed by your nurse. If you are instructed to bring home medications, please give them to your nurse as they will be administered by the nursing staff. If you have any questions, please call [address] [place] [address] [phone]. A copy of this note was provided to the patient for reference.

## 2023-11-03 NOTE — TELEPHONE ENCOUNTER
----- Message from Gino Hartman RN sent at 11/3/2023  9:11 AM EDT -----  Patient is here at pre-assessment. He is questioning an appointment scheduled on 11/6/23 for labwork. We have gotten labs here today. Does he need to go to the appointment you all have scheduled? Changed NS to Nasonex, as intolerant fluticasone d/t taste.

## 2023-11-03 NOTE — PERIOP NOTE
Latest Reference Range & Units 11/03/23 09:05   Sodium 133 - 143 mmol/L 140   Potassium 3.5 - 5.1 mmol/L 4.3   Chloride 101 - 110 mmol/L 105   CO2 21 - 32 mmol/L 29   BUN,BUNPL 8 - 23 MG/DL 12   Creatinine 0.8 - 1.5 MG/DL 1.18   Anion Gap 2 - 11 mmol/L 6   Est, Glom Filt Rate >60 ml/min/1.73m2 >60   Glucose, Random 65 - 100 mg/dL 131 (H)   CALCIUM, SERUM, 503191 8.3 - 10.4 MG/DL 9.2   WBC 4.3 - 11.1 K/uL 7.6   RBC 4.23 - 5.6 M/uL 5.55   Hemoglobin Quant 13.6 - 17.2 g/dL 15.7   Hematocrit 41.1 - 50.3 % 49.2   MCV 82.0 - 102.0 FL 88.6   MCH 26.1 - 32.9 PG 28.3   MCHC 31.4 - 35.0 g/dL 31.9   MPV 9.4 - 12.3 FL 10.0   RDW 11.9 - 14.6 % 13.9   Platelet Count 570 - 450 K/uL 254   Nucleated Red Blood Cells 0.0 - 0.2 K/uL 0.00   Color, UA -   YELLOW/STRAW   Glucose, UA mg/dL Negative   Bilirubin, Urine NEG   Negative   Ketones, Urine NEG mg/dL Negative   Specific Gravity, UA 1.001 - 1.023   1.012   Blood, Urine NEG   TRACE ! Protein, UA NEG mg/dL Negative   Urobilinogen, Urine 0.2 - 1.0 EU/dL 0.2   Nitrite, Urine NEG   Positive ! Leukocyte Esterase, Urine NEG   LARGE ! Appearance -   CLOUDY   pH, Urine 5.0 - 9.0   5.0   WBC, UA 0 /hpf 10-20   RBC, UA 0 /hpf 5-10   Epithelial Cells, UA 0 /hpf 0-3   Bacteria, UA 0 /hpf TRACE   (H): Data is abnormally high  !: Data is abnormal    Labs reviewed. Abnormal urine noted with POSITIVE nitrates and Leukocytes. Trace blood also noted. Labs automatically routed to ordering provider via Epic documentation.

## 2023-11-03 NOTE — TELEPHONE ENCOUNTER
The patient is having surgery 11/9/23. Does he need to keep his follow up appointments with Mirian Hurtado for 11/6/23 (blood draw) and 11/13/23 OV with Mirian Hurtado? Please advise. Thank you.

## 2023-11-03 NOTE — PERIOP NOTE
Patient verified name and     Order for consent  found in EHR and matches case posting; patient verified. Type 2 surgery, walk-in assessment complete. Labs per surgeon: CBC, BMP, U/A; results pending  Labs per anesthesia protocol: SQBS (DOS), T/S (DOS); GLUCOSE  131  EKG: Last EKG recorded on 2023. / Eliquis clearance in EHR 2023  ECHO last noted 23. Hospital approved surgical skin cleanser and instructions given per hospital policy. Patient provided with and instructed on educational handouts including Guide to Surgery, Pain Management, Hand Hygiene, Blood Transfusion Education, and [facility] Brochure. Patient answered medical/surgical history questions at their best of ability. All prior to admission medications documented in [facility] Care. Original medication prescription bottle visualized during patient appointment. Patient instructed to hold all vitamins 7 days prior to surgery and NSAIDS 5 days prior to surgery, patient verbalized understanding. Patient teach back successful and patient demonstrates knowledge of instructions.

## 2023-11-09 ENCOUNTER — ANESTHESIA (OUTPATIENT)
Dept: SURGERY | Age: 63
End: 2023-11-09
Payer: MEDICARE

## 2023-11-09 ENCOUNTER — ANESTHESIA EVENT (OUTPATIENT)
Dept: SURGERY | Age: 63
End: 2023-11-09
Payer: MEDICARE

## 2023-11-09 ENCOUNTER — HOSPITAL ENCOUNTER (OUTPATIENT)
Age: 63
Setting detail: OBSERVATION
Discharge: HOME | End: 2023-11-11
Attending: UROLOGY | Admitting: UROLOGY
Payer: MEDICARE

## 2023-11-09 DIAGNOSIS — N13.8 BPH WITH URINARY OBSTRUCTION: Primary | ICD-10-CM

## 2023-11-09 DIAGNOSIS — N13.8 BPH WITH OBSTRUCTION/LOWER URINARY TRACT SYMPTOMS: ICD-10-CM

## 2023-11-09 DIAGNOSIS — N40.1 BPH WITH URINARY OBSTRUCTION: Primary | ICD-10-CM

## 2023-11-09 DIAGNOSIS — N40.1 BPH WITH OBSTRUCTION/LOWER URINARY TRACT SYMPTOMS: ICD-10-CM

## 2023-11-09 LAB
ABO + RH BLD: NORMAL
BLOOD GROUP ANTIBODIES SERPL: NORMAL
EST. AVERAGE GLUCOSE BLD GHB EST-MCNC: 128 MG/DL
GLUCOSE BLD STRIP.AUTO-MCNC: 103 MG/DL (ref 65–100)
GLUCOSE BLD STRIP.AUTO-MCNC: 173 MG/DL (ref 65–100)
GLUCOSE BLD STRIP.AUTO-MCNC: 179 MG/DL (ref 65–100)
GLUCOSE BLD STRIP.AUTO-MCNC: 202 MG/DL (ref 65–100)
HBA1C MFR BLD: 6.1 % (ref 4.8–5.6)
SERVICE CMNT-IMP: ABNORMAL
SPECIMEN EXP DATE BLD: NORMAL

## 2023-11-09 PROCEDURE — 2580000003 HC RX 258: Performed by: ANESTHESIOLOGY

## 2023-11-09 PROCEDURE — 52601 PROSTATECTOMY (TURP): CPT | Performed by: UROLOGY

## 2023-11-09 PROCEDURE — 6360000002 HC RX W HCPCS: Performed by: UROLOGY

## 2023-11-09 PROCEDURE — 86850 RBC ANTIBODY SCREEN: CPT

## 2023-11-09 PROCEDURE — G0378 HOSPITAL OBSERVATION PER HR: HCPCS

## 2023-11-09 PROCEDURE — 83036 HEMOGLOBIN GLYCOSYLATED A1C: CPT

## 2023-11-09 PROCEDURE — 2580000003 HC RX 258: Performed by: UROLOGY

## 2023-11-09 PROCEDURE — 7100000000 HC PACU RECOVERY - FIRST 15 MIN: Performed by: UROLOGY

## 2023-11-09 PROCEDURE — 6360000002 HC RX W HCPCS: Performed by: NURSE ANESTHETIST, CERTIFIED REGISTERED

## 2023-11-09 PROCEDURE — 3600000014 HC SURGERY LEVEL 4 ADDTL 15MIN: Performed by: UROLOGY

## 2023-11-09 PROCEDURE — 86901 BLOOD TYPING SEROLOGIC RH(D): CPT

## 2023-11-09 PROCEDURE — 6370000000 HC RX 637 (ALT 250 FOR IP): Performed by: ANESTHESIOLOGY

## 2023-11-09 PROCEDURE — 6370000000 HC RX 637 (ALT 250 FOR IP): Performed by: UROLOGY

## 2023-11-09 PROCEDURE — 82962 GLUCOSE BLOOD TEST: CPT

## 2023-11-09 PROCEDURE — 36415 COLL VENOUS BLD VENIPUNCTURE: CPT

## 2023-11-09 PROCEDURE — 3700000000 HC ANESTHESIA ATTENDED CARE: Performed by: UROLOGY

## 2023-11-09 PROCEDURE — 88305 TISSUE EXAM BY PATHOLOGIST: CPT

## 2023-11-09 PROCEDURE — 2709999900 HC NON-CHARGEABLE SUPPLY: Performed by: UROLOGY

## 2023-11-09 PROCEDURE — 86900 BLOOD TYPING SEROLOGIC ABO: CPT

## 2023-11-09 PROCEDURE — 2500000003 HC RX 250 WO HCPCS: Performed by: NURSE ANESTHETIST, CERTIFIED REGISTERED

## 2023-11-09 PROCEDURE — 7100000001 HC PACU RECOVERY - ADDTL 15 MIN: Performed by: UROLOGY

## 2023-11-09 PROCEDURE — 2720000010 HC SURG SUPPLY STERILE: Performed by: UROLOGY

## 2023-11-09 PROCEDURE — 3600000004 HC SURGERY LEVEL 4 BASE: Performed by: UROLOGY

## 2023-11-09 PROCEDURE — 3700000001 HC ADD 15 MINUTES (ANESTHESIA): Performed by: UROLOGY

## 2023-11-09 RX ORDER — PROPOFOL 10 MG/ML
INJECTION, EMULSION INTRAVENOUS PRN
Status: DISCONTINUED | OUTPATIENT
Start: 2023-11-09 | End: 2023-11-09 | Stop reason: SDUPTHER

## 2023-11-09 RX ORDER — EPHEDRINE SULFATE/0.9% NACL/PF 50 MG/5 ML
SYRINGE (ML) INTRAVENOUS PRN
Status: DISCONTINUED | OUTPATIENT
Start: 2023-11-09 | End: 2023-11-09 | Stop reason: SDUPTHER

## 2023-11-09 RX ORDER — HYDROMORPHONE HYDROCHLORIDE 2 MG/ML
0.5 INJECTION, SOLUTION INTRAMUSCULAR; INTRAVENOUS; SUBCUTANEOUS EVERY 5 MIN PRN
Status: DISCONTINUED | OUTPATIENT
Start: 2023-11-09 | End: 2023-11-09 | Stop reason: HOSPADM

## 2023-11-09 RX ORDER — DEXAMETHASONE SODIUM PHOSPHATE 4 MG/ML
INJECTION, SOLUTION INTRA-ARTICULAR; INTRALESIONAL; INTRAMUSCULAR; INTRAVENOUS; SOFT TISSUE PRN
Status: DISCONTINUED | OUTPATIENT
Start: 2023-11-09 | End: 2023-11-09 | Stop reason: SDUPTHER

## 2023-11-09 RX ORDER — TAMSULOSIN HYDROCHLORIDE 0.4 MG/1
0.4 CAPSULE ORAL DAILY
Status: DISCONTINUED | OUTPATIENT
Start: 2023-11-09 | End: 2023-11-11 | Stop reason: HOSPADM

## 2023-11-09 RX ORDER — ONDANSETRON 2 MG/ML
INJECTION INTRAMUSCULAR; INTRAVENOUS PRN
Status: DISCONTINUED | OUTPATIENT
Start: 2023-11-09 | End: 2023-11-09 | Stop reason: SDUPTHER

## 2023-11-09 RX ORDER — ACETAMINOPHEN 500 MG
1000 TABLET ORAL ONCE
Status: COMPLETED | OUTPATIENT
Start: 2023-11-09 | End: 2023-11-09

## 2023-11-09 RX ORDER — POTASSIUM CHLORIDE 20 MEQ/1
20 TABLET, EXTENDED RELEASE ORAL DAILY
Status: DISCONTINUED | OUTPATIENT
Start: 2023-11-09 | End: 2023-11-11 | Stop reason: HOSPADM

## 2023-11-09 RX ORDER — POTASSIUM CHLORIDE 7.45 MG/ML
10 INJECTION INTRAVENOUS PRN
Status: DISCONTINUED | OUTPATIENT
Start: 2023-11-09 | End: 2023-11-11 | Stop reason: HOSPADM

## 2023-11-09 RX ORDER — FUROSEMIDE 40 MG/1
40 TABLET ORAL DAILY
Status: DISCONTINUED | OUTPATIENT
Start: 2023-11-09 | End: 2023-11-11 | Stop reason: HOSPADM

## 2023-11-09 RX ORDER — LIDOCAINE HYDROCHLORIDE 20 MG/ML
INJECTION, SOLUTION EPIDURAL; INFILTRATION; INTRACAUDAL; PERINEURAL PRN
Status: DISCONTINUED | OUTPATIENT
Start: 2023-11-09 | End: 2023-11-09 | Stop reason: SDUPTHER

## 2023-11-09 RX ORDER — MAGNESIUM SULFATE IN WATER 40 MG/ML
2000 INJECTION, SOLUTION INTRAVENOUS PRN
Status: DISCONTINUED | OUTPATIENT
Start: 2023-11-09 | End: 2023-11-11 | Stop reason: HOSPADM

## 2023-11-09 RX ORDER — SODIUM CHLORIDE 9 MG/ML
INJECTION, SOLUTION INTRAVENOUS CONTINUOUS
Status: DISCONTINUED | OUTPATIENT
Start: 2023-11-09 | End: 2023-11-10

## 2023-11-09 RX ORDER — SODIUM CHLORIDE, SODIUM LACTATE, POTASSIUM CHLORIDE, CALCIUM CHLORIDE 600; 310; 30; 20 MG/100ML; MG/100ML; MG/100ML; MG/100ML
INJECTION, SOLUTION INTRAVENOUS CONTINUOUS
Status: DISCONTINUED | OUTPATIENT
Start: 2023-11-09 | End: 2023-11-11

## 2023-11-09 RX ORDER — SENNA AND DOCUSATE SODIUM 50; 8.6 MG/1; MG/1
1 TABLET, FILM COATED ORAL 2 TIMES DAILY
Status: DISCONTINUED | OUTPATIENT
Start: 2023-11-09 | End: 2023-11-11 | Stop reason: HOSPADM

## 2023-11-09 RX ORDER — LIDOCAINE HYDROCHLORIDE 10 MG/ML
1 INJECTION, SOLUTION INFILTRATION; PERINEURAL
Status: DISCONTINUED | OUTPATIENT
Start: 2023-11-09 | End: 2023-11-09 | Stop reason: HOSPADM

## 2023-11-09 RX ORDER — SODIUM CHLORIDE 0.9 % (FLUSH) 0.9 %
5-40 SYRINGE (ML) INJECTION PRN
Status: DISCONTINUED | OUTPATIENT
Start: 2023-11-09 | End: 2023-11-11 | Stop reason: HOSPADM

## 2023-11-09 RX ORDER — SODIUM CHLORIDE 9 MG/ML
INJECTION, SOLUTION INTRAVENOUS PRN
Status: DISCONTINUED | OUTPATIENT
Start: 2023-11-09 | End: 2023-11-09 | Stop reason: HOSPADM

## 2023-11-09 RX ORDER — OXYCODONE HYDROCHLORIDE 5 MG/1
5 TABLET ORAL
Status: DISCONTINUED | OUTPATIENT
Start: 2023-11-09 | End: 2023-11-09 | Stop reason: HOSPADM

## 2023-11-09 RX ORDER — INSULIN LISPRO 100 [IU]/ML
0-4 INJECTION, SOLUTION INTRAVENOUS; SUBCUTANEOUS
Status: DISCONTINUED | OUTPATIENT
Start: 2023-11-09 | End: 2023-11-11 | Stop reason: HOSPADM

## 2023-11-09 RX ORDER — SODIUM CHLORIDE 0.9 % (FLUSH) 0.9 %
5-40 SYRINGE (ML) INJECTION PRN
Status: DISCONTINUED | OUTPATIENT
Start: 2023-11-09 | End: 2023-11-09 | Stop reason: HOSPADM

## 2023-11-09 RX ORDER — ROCURONIUM BROMIDE 10 MG/ML
INJECTION, SOLUTION INTRAVENOUS PRN
Status: DISCONTINUED | OUTPATIENT
Start: 2023-11-09 | End: 2023-11-09 | Stop reason: SDUPTHER

## 2023-11-09 RX ORDER — SODIUM CHLORIDE 0.9 % (FLUSH) 0.9 %
5-40 SYRINGE (ML) INJECTION EVERY 12 HOURS SCHEDULED
Status: DISCONTINUED | OUTPATIENT
Start: 2023-11-09 | End: 2023-11-11 | Stop reason: HOSPADM

## 2023-11-09 RX ORDER — FENTANYL CITRATE 50 UG/ML
INJECTION, SOLUTION INTRAMUSCULAR; INTRAVENOUS PRN
Status: DISCONTINUED | OUTPATIENT
Start: 2023-11-09 | End: 2023-11-09 | Stop reason: SDUPTHER

## 2023-11-09 RX ORDER — GLYCOPYRROLATE 0.2 MG/ML
INJECTION INTRAMUSCULAR; INTRAVENOUS PRN
Status: DISCONTINUED | OUTPATIENT
Start: 2023-11-09 | End: 2023-11-09 | Stop reason: SDUPTHER

## 2023-11-09 RX ORDER — POTASSIUM CHLORIDE 20 MEQ/1
40 TABLET, EXTENDED RELEASE ORAL PRN
Status: DISCONTINUED | OUTPATIENT
Start: 2023-11-09 | End: 2023-11-11 | Stop reason: HOSPADM

## 2023-11-09 RX ORDER — SODIUM CHLORIDE 0.9 % (FLUSH) 0.9 %
5-40 SYRINGE (ML) INJECTION EVERY 12 HOURS SCHEDULED
Status: DISCONTINUED | OUTPATIENT
Start: 2023-11-09 | End: 2023-11-09 | Stop reason: HOSPADM

## 2023-11-09 RX ORDER — ONDANSETRON 2 MG/ML
4 INJECTION INTRAMUSCULAR; INTRAVENOUS EVERY 6 HOURS PRN
Status: DISCONTINUED | OUTPATIENT
Start: 2023-11-09 | End: 2023-11-11 | Stop reason: HOSPADM

## 2023-11-09 RX ORDER — METOPROLOL SUCCINATE 25 MG/1
100 TABLET, EXTENDED RELEASE ORAL DAILY
Status: DISCONTINUED | OUTPATIENT
Start: 2023-11-09 | End: 2023-11-11 | Stop reason: HOSPADM

## 2023-11-09 RX ORDER — PROCHLORPERAZINE EDISYLATE 5 MG/ML
5 INJECTION INTRAMUSCULAR; INTRAVENOUS
Status: DISCONTINUED | OUTPATIENT
Start: 2023-11-09 | End: 2023-11-09 | Stop reason: HOSPADM

## 2023-11-09 RX ORDER — ONDANSETRON 2 MG/ML
4 INJECTION INTRAMUSCULAR; INTRAVENOUS
Status: DISCONTINUED | OUTPATIENT
Start: 2023-11-09 | End: 2023-11-09 | Stop reason: HOSPADM

## 2023-11-09 RX ORDER — MEPERIDINE HYDROCHLORIDE 25 MG/ML
12.5 INJECTION INTRAMUSCULAR; INTRAVENOUS; SUBCUTANEOUS EVERY 5 MIN PRN
Status: DISCONTINUED | OUTPATIENT
Start: 2023-11-09 | End: 2023-11-09 | Stop reason: HOSPADM

## 2023-11-09 RX ORDER — MORPHINE SULFATE 2 MG/ML
2 INJECTION, SOLUTION INTRAMUSCULAR; INTRAVENOUS EVERY 4 HOURS PRN
Status: DISCONTINUED | OUTPATIENT
Start: 2023-11-09 | End: 2023-11-11 | Stop reason: HOSPADM

## 2023-11-09 RX ORDER — FINASTERIDE 5 MG/1
5 TABLET, FILM COATED ORAL DAILY
Status: DISCONTINUED | OUTPATIENT
Start: 2023-11-09 | End: 2023-11-11 | Stop reason: HOSPADM

## 2023-11-09 RX ORDER — OXYCODONE HYDROCHLORIDE AND ACETAMINOPHEN 5; 325 MG/1; MG/1
1 TABLET ORAL EVERY 4 HOURS PRN
Status: DISCONTINUED | OUTPATIENT
Start: 2023-11-09 | End: 2023-11-11 | Stop reason: HOSPADM

## 2023-11-09 RX ORDER — SODIUM CHLORIDE 9 MG/ML
INJECTION, SOLUTION INTRAVENOUS PRN
Status: DISCONTINUED | OUTPATIENT
Start: 2023-11-09 | End: 2023-11-11 | Stop reason: HOSPADM

## 2023-11-09 RX ORDER — ATORVASTATIN CALCIUM 20 MG/1
20 TABLET, FILM COATED ORAL NIGHTLY
Status: DISCONTINUED | OUTPATIENT
Start: 2023-11-09 | End: 2023-11-11 | Stop reason: HOSPADM

## 2023-11-09 RX ORDER — NEOSTIGMINE METHYLSULFATE 1 MG/ML
INJECTION, SOLUTION INTRAVENOUS PRN
Status: DISCONTINUED | OUTPATIENT
Start: 2023-11-09 | End: 2023-11-09 | Stop reason: SDUPTHER

## 2023-11-09 RX ORDER — ONDANSETRON 4 MG/1
4 TABLET, ORALLY DISINTEGRATING ORAL EVERY 8 HOURS PRN
Status: DISCONTINUED | OUTPATIENT
Start: 2023-11-09 | End: 2023-11-11 | Stop reason: HOSPADM

## 2023-11-09 RX ORDER — INSULIN LISPRO 100 [IU]/ML
0-4 INJECTION, SOLUTION INTRAVENOUS; SUBCUTANEOUS NIGHTLY
Status: DISCONTINUED | OUTPATIENT
Start: 2023-11-09 | End: 2023-11-11 | Stop reason: HOSPADM

## 2023-11-09 RX ORDER — IBUPROFEN 600 MG/1
1 TABLET ORAL PRN
Status: DISCONTINUED | OUTPATIENT
Start: 2023-11-09 | End: 2023-11-11 | Stop reason: HOSPADM

## 2023-11-09 RX ORDER — DEXTROSE MONOHYDRATE 100 MG/ML
INJECTION, SOLUTION INTRAVENOUS CONTINUOUS PRN
Status: DISCONTINUED | OUTPATIENT
Start: 2023-11-09 | End: 2023-11-11 | Stop reason: HOSPADM

## 2023-11-09 RX ORDER — MIDAZOLAM HYDROCHLORIDE 2 MG/2ML
2 INJECTION, SOLUTION INTRAMUSCULAR; INTRAVENOUS
Status: DISCONTINUED | OUTPATIENT
Start: 2023-11-09 | End: 2023-11-09 | Stop reason: HOSPADM

## 2023-11-09 RX ADMIN — ACETAMINOPHEN 1000 MG: 500 TABLET, FILM COATED ORAL at 06:56

## 2023-11-09 RX ADMIN — Medication 2000 MG: at 09:05

## 2023-11-09 RX ADMIN — PHENYLEPHRINE HYDROCHLORIDE 100 MCG: 0.1 INJECTION, SOLUTION INTRAVENOUS at 10:04

## 2023-11-09 RX ADMIN — DEXAMETHASONE SODIUM PHOSPHATE 4 MG: 4 INJECTION, SOLUTION INTRAMUSCULAR; INTRAVENOUS at 08:59

## 2023-11-09 RX ADMIN — INSULIN LISPRO 3 UNITS: 100 INJECTION, SOLUTION INTRAVENOUS; SUBCUTANEOUS at 17:15

## 2023-11-09 RX ADMIN — PHENYLEPHRINE HYDROCHLORIDE 100 MCG: 0.1 INJECTION, SOLUTION INTRAVENOUS at 10:01

## 2023-11-09 RX ADMIN — ONDANSETRON 4 MG: 2 INJECTION INTRAMUSCULAR; INTRAVENOUS at 08:59

## 2023-11-09 RX ADMIN — FENTANYL CITRATE 50 MCG: 50 INJECTION, SOLUTION INTRAMUSCULAR; INTRAVENOUS at 09:50

## 2023-11-09 RX ADMIN — Medication 5 MG: at 10:01

## 2023-11-09 RX ADMIN — DOCUSATE SODIUM 50 MG AND SENNOSIDES 8.6 MG 1 TABLET: 8.6; 5 TABLET, FILM COATED ORAL at 21:18

## 2023-11-09 RX ADMIN — GLYCOPYRROLATE 0.6 MG: 0.2 INJECTION INTRAMUSCULAR; INTRAVENOUS at 10:33

## 2023-11-09 RX ADMIN — Medication 10 MG: at 08:55

## 2023-11-09 RX ADMIN — SODIUM CHLORIDE, PRESERVATIVE FREE 10 ML: 5 INJECTION INTRAVENOUS at 17:40

## 2023-11-09 RX ADMIN — Medication 10 MG: at 10:04

## 2023-11-09 RX ADMIN — FENTANYL CITRATE 50 MCG: 50 INJECTION, SOLUTION INTRAMUSCULAR; INTRAVENOUS at 08:52

## 2023-11-09 RX ADMIN — LIDOCAINE HYDROCHLORIDE 80 MG: 20 INJECTION, SOLUTION EPIDURAL; INFILTRATION; INTRACAUDAL; PERINEURAL at 08:52

## 2023-11-09 RX ADMIN — PHENYLEPHRINE HYDROCHLORIDE 100 MCG: 0.1 INJECTION, SOLUTION INTRAVENOUS at 09:58

## 2023-11-09 RX ADMIN — ROCURONIUM BROMIDE 35 MG: 10 INJECTION, SOLUTION INTRAVENOUS at 08:52

## 2023-11-09 RX ADMIN — SACUBITRIL AND VALSARTAN 1 TABLET: 97; 103 TABLET, FILM COATED ORAL at 21:18

## 2023-11-09 RX ADMIN — Medication 4 MG: at 10:33

## 2023-11-09 RX ADMIN — SODIUM CHLORIDE, SODIUM LACTATE, POTASSIUM CHLORIDE, AND CALCIUM CHLORIDE: 600; 310; 30; 20 INJECTION, SOLUTION INTRAVENOUS at 09:20

## 2023-11-09 RX ADMIN — SODIUM CHLORIDE, PRESERVATIVE FREE 10 ML: 5 INJECTION INTRAVENOUS at 21:19

## 2023-11-09 RX ADMIN — SODIUM CHLORIDE, SODIUM LACTATE, POTASSIUM CHLORIDE, AND CALCIUM CHLORIDE: 600; 310; 30; 20 INJECTION, SOLUTION INTRAVENOUS at 06:57

## 2023-11-09 RX ADMIN — SODIUM CHLORIDE: 9 INJECTION, SOLUTION INTRAVENOUS at 18:19

## 2023-11-09 RX ADMIN — FENTANYL CITRATE 50 MCG: 50 INJECTION, SOLUTION INTRAMUSCULAR; INTRAVENOUS at 09:54

## 2023-11-09 RX ADMIN — FENTANYL CITRATE 50 MCG: 50 INJECTION, SOLUTION INTRAMUSCULAR; INTRAVENOUS at 09:18

## 2023-11-09 RX ADMIN — PROPOFOL 170 MG: 10 INJECTION, EMULSION INTRAVENOUS at 08:52

## 2023-11-09 RX ADMIN — ATORVASTATIN CALCIUM 20 MG: 20 TABLET, FILM COATED ORAL at 21:18

## 2023-11-09 RX ADMIN — ROCURONIUM BROMIDE 5 MG: 10 INJECTION, SOLUTION INTRAVENOUS at 09:26

## 2023-11-09 RX ADMIN — CEFAZOLIN SODIUM 2000 MG: 100 INJECTION, POWDER, LYOPHILIZED, FOR SOLUTION INTRAVENOUS at 17:15

## 2023-11-09 ASSESSMENT — PAIN - FUNCTIONAL ASSESSMENT
PAIN_FUNCTIONAL_ASSESSMENT: NONE - DENIES PAIN
PAIN_FUNCTIONAL_ASSESSMENT: 0-10
PAIN_FUNCTIONAL_ASSESSMENT: NONE - DENIES PAIN

## 2023-11-09 NOTE — BRIEF OP NOTE
Brief Postoperative Note      Patient: Cleveland Primrose  YOB: 1960  MRN: [id number]    Date of Procedure: 11/9/2023    Pre-Op Diagnosis Codes:     * BPH with obstruction/lower urinary tract symptoms [N40.1, N13.8]    Post-Op Diagnosis: Same       Procedure(s):  CYSTOSCOPY, BIPOLAR TRANSURETHRAL RESECTION PROSTATE    Surgeon(s): Jolene Murray MD    Assistant:  * No surgical staff found *    Anesthesia: General    Estimated Blood Loss (mL): less than 50     Complications: None    Specimens:   ID Type Source Tests Collected by Time Destination   A : Prostate chips Tissue Prostate SURGICAL PATHOLOGY Jolene Murray MD 11/9/2023 6797        Implants:  * No implants in log *      Drains:   Urinary Catheter 11/09/23 3 Way (Active)       [REMOVED] Urinary Catheter 09/25/23 Coude (Removed)       [REMOVED] Urinary Catheter 10/20/23 2 Way (Removed)   Catheter Indications Urinary retention (acute or chronic), continuous bladder irrigation or bladder outlet obstruction 11/09/23 0717   Site Assessment No urethral drainage 11/09/23 0717   Urine Appearance Clear 11/09/23 0717   Collection Container Standard 11/09/23 0717   Securement Method Other (Comment) 11/09/23 0717   Catheter Best Practices  Drainage tube clipped to bed; Tamper seal intact; Bag below bladder;Bag not on floor; Lack of dependent loop in tubing;Drainage bag less than half full 11/09/23 0717   Status Draining;Patent 11/09/23 [id number]       Findings: Lateral lobe BPH causing severe visual obstruction.        Electronically signed by Jolene Murray MD on 11/9/2023 at 10:40 AM

## 2023-11-09 NOTE — OP NOTE
[facility]  OPERATIVE REPORT    Name:  Odessa Sorensen  MR#:  [id number]  :  1960  ACCOUNT #:  [de-identified]  DATE OF SERVICE:  2023    PREOPERATIVE DIAGNOSIS:  Benign prostatic hyperplasia with urinary retention. POSTOPERATIVE DIAGNOSIS:  Benign prostatic hyperplasia with urinary retention. PROCEDURES PERFORMED:  Cystoscopy, bipolar transurethral resection of prostate. SURGEON:  Horatio Severe, MD    ASSISTANT:  None. ANESTHESIA:  General.    COMPLICATIONS:  None. SPECIMENS REMOVED:  Prostate chips for pathology. IMPLANTS:  None. ESTIMATED BLOOD LOSS:  Less than 50. DRAINS:  24 three-way Breen catheter with 30 mL sterile water balloon. FINDINGS:  Lateral lobe BPH causing severe visual obstruction. INDICATIONS FOR OPERATIVE PROCEDURE:  The patient is a pleasant 74-year-old gentleman with a history of urinary retention requiring Breen catheter and significant BPH refractory to medications found on workup. He was counseled on management options and wanted to proceed with a bipolar TURP today after risk-benefit discussion was had. DESCRIPTION OF OPERATIVE PROCEDURE:  After informed consent was obtained, the correct patient was identified in the preoperative holding area. He was taken back to operating suite, placed on the table in supine position. Time-out was performed confirming correct patient and planned procedure. He received 2 g of IV Ancef prior to smooth induction of general endotracheal anesthesia. He was moved into dorsal lithotomy position, prepped and draped in the usual sterile fashion. I began the case by inserting a 26-Kazakh bipolar resectoscope using the visual obturator. I advanced this through the urethra and prostate into the patient's bladder. He had significant lateral lobe BPH with a small median bar causing complete visual obstruction at the bladder outlet.   The ureteral orifices were in their orthotopic positions bilaterally

## 2023-11-09 NOTE — ANESTHESIA PRE PROCEDURE
09:05 AM    GFRAA >60 07/19/2022 03:40 PM    AGRATIO 1.1 05/20/2021 04:46 AM    LABGLOM >60 11/03/2023 09:05 AM    GLUCOSE 131 11/03/2023 09:05 AM    PROT 7.5 09/29/2023 12:43 AM    CALCIUM 9.2 11/03/2023 09:05 AM    BILITOT 0.6 09/29/2023 12:43 AM    ALKPHOS 89 09/29/2023 12:43 AM    ALKPHOS 88 05/20/2021 04:46 AM    AST 14 09/29/2023 12:43 AM    ALT 18 09/29/2023 12:43 AM       POC Tests:   Recent Labs     11/09/23  0705   POCGLU 103*       Coags:   Lab Results   Component Value Date/Time    PROTIME 14.9 09/29/2023 12:43 AM    INR 1.1 09/29/2023 12:43 AM       HCG (If Applicable): No results found for: \"PREGTESTUR\", \"PREGSERUM\", \"HCG\", \"HCGQUANT\"     ABGs: No results found for: \"PHART\", \"PO2ART\", \"MQF2CRV\", \"PDB0IKS\", \"BEART\", \"X9IAWZDB\"     Type & Screen (If Applicable):  No results found for: \"LABABO\", \"LABRH\"    Drug/Infectious Status (If Applicable):  No results found for: \"HIV\", \"HEPCAB\"    COVID-19 Screening (If Applicable):   Lab Results   Component Value Date/Time    COVID19 Detected 07/19/2022 03:40 PM           Anesthesia Evaluation  Patient summary reviewed  Airway: Mallampati: III  TM distance: >3 FB   Neck ROM: full     Dental:    (+) edentulous      Pulmonary:Negative Pulmonary ROS and normal exam  breath sounds clear to auscultation                             Cardiovascular:  Exercise tolerance: good (>4 METS),   (+) hypertension:, dysrhythmias (On Eliquis but stopped about 6 weeks ago): atrial fibrillation, CHF (H/o NICM with reduced EF, but has no recovered.  Most recent echo shows normal LFEF):,       ECG reviewed  Rhythm: regular  Rate: normal  Echocardiogram reviewed         Beta Blocker:  Dose within 24 Hrs      ROS comment: Echo 2/2023 showing EF 59%, mild MR, mild TR     Neuro/Psych:   (+) depression/anxiety              ROS comment: Chronic LBP GI/Hepatic/Renal:   (+) renal disease: CRI,           Endo/Other:    (+) DiabetesType II DM, , : arthritis:., malignancy/cancer (H/o Colon Cancer

## 2023-11-09 NOTE — PROGRESS NOTES
Pt denies needs at this time. CBI draining to a slow drip to gravity. Clear pink urine noted. Pt ambulated the halls twice with nurse with an unsteady gait at first but gained steadiness as the walk progressed. Pt states this is normal for him when he first get up. Pain is controled at this time. Bed in low position, locked and call light/personal items within reach. Will continue to monitor and report to night shift nurse.

## 2023-11-09 NOTE — H&P
[address] [place] [address] Drive Urology H&P Note                                           11/09/23     Patient: Stella Andersen  MRN: [id number]    Admission Date:  11/9/2023, 0  Admission Diagnosis: BPH with obstruction/lower urinary tract symptoms [N40.1, N13.8]  Reason for Consult: BPH/Urinary Retention    ASSESSMENT: 66 y.o. male with BPH/urinary retention with mak catheter in place who presents for TURP procedure. PLAN:  -To OR for cystoscopy, bipolar trans-urethral resection of prostate  -Consented  -Antibiotic on call to OR  -NPO for procedure.       __________________________________________________________________________________    HPI:     Stella Andersen is a 66 y.o. male with BPH/urinary retention with mak catheter in place who presents for TURP procedure. Off blood thinners for 3 days. Npo for surgery. Catheter draining. No fever/chills. Past Medical History:  Past Medical History:   Diagnosis Date    Arthritis     Atrial fibrillation ([address])     patient was taking eliquis. per patient he has not taken since \"last week of september\"    Chronic back pain     Colon cancer ([address]) 1990    Partial Colectomy and Chemo. Depression 05/14/2015    managed well with meds    Diabetes ([address])     oral agent only/pt doesnt monitor BS at home/no s.s of hypoglycemia/    Former cigarette smoker     History of GI bleed     HTN (hypertension) 04/28/2014    controlled well with meds    Hypercholesteremia     controlled well with meds    Nonischemic cardiomyopathy ([address])     Followed by Dr. Azeb Boo       Past Surgical History:  Past Surgical History:   Procedure Laterality Date    CHOLECYSTECTOMY, LAPAROSCOPIC      COLONOSCOPY      SMALL INTESTINE SURGERY  1990    WISDOM TOOTH EXTRACTION         Medications:  No current facility-administered medications on file prior to encounter.      Current Outpatient Medications on File Prior to Encounter   Medication Sig Dispense Refill finasteride (PROSCAR) 5 MG tablet Take 1 tablet by mouth daily 90 tablet 3    tamsulosin (FLOMAX) 0.4 MG capsule Take 1 capsule by mouth daily 90 capsule 3    apixaban (ELIQUIS) 5 MG TABS tablet Take 1 tablet by mouth 2 times daily (Patient taking differently: Take 1 tablet by mouth 2 times daily Patient states he is currently holding and has not taken since \"last week of September\") 180 tablet 3    sacubitril-valsartan (ENTRESTO)  MG per tablet Take 1 tablet by mouth 2 times daily 60 tablet 11    atorvastatin (LIPITOR) 20 MG tablet Take 1 tablet by mouth daily 90 tablet 3    furosemide (LASIX) 40 MG tablet Take 1 tablet by mouth daily 90 tablet 3    metoprolol succinate (TOPROL XL) 100 MG extended release tablet Take 1 tablet by mouth daily 90 tablet 3    potassium chloride (KLOR-CON M) 20 MEQ extended release tablet Take 1 tablet by mouth daily 90 tablet 3       Allergies:  No Known Allergies    Social History:  Social History     Socioeconomic History    Marital status: Single     Spouse name: Not on file    Number of children: Not on file    Years of education: Not on file    Highest education level: Not on file   Occupational History    Not on file   Tobacco Use    Smoking status: Former     Packs/day: 0.25     Years: 10.00     Additional pack years: 0.00     Total pack years: 2.50     Types: Cigarettes     Quit date: 2006     Years since quittin.7    Smokeless tobacco: Never   Vaping Use    Vaping Use: Never used   Substance and Sexual Activity    Alcohol use: No    Drug use: No    Sexual activity: Not Currently   Other Topics Concern    Not on file   Social History Narrative    Not on file     Social Determinants of Health     Financial Resource Strain: Not on file   Food Insecurity: Not on file   Transportation Needs: Not on file   Physical Activity: Not on file   Stress: Not on file   Social Connections: Not on file   Intimate Partner Violence: Not on file   Housing Stability: Not on file

## 2023-11-10 LAB
ANION GAP SERPL CALC-SCNC: 7 MMOL/L (ref 2–11)
BASOPHILS # BLD: 0 K/UL (ref 0–0.2)
BASOPHILS NFR BLD: 0 % (ref 0–2)
BUN SERPL-MCNC: 16 MG/DL (ref 8–23)
CALCIUM SERPL-MCNC: 8.7 MG/DL (ref 8.3–10.4)
CHLORIDE SERPL-SCNC: 108 MMOL/L (ref 101–110)
CO2 SERPL-SCNC: 22 MMOL/L (ref 21–32)
CREAT SERPL-MCNC: 1.2 MG/DL (ref 0.8–1.5)
DIFFERENTIAL METHOD BLD: ABNORMAL
EOSINOPHIL # BLD: 0.1 K/UL (ref 0–0.8)
EOSINOPHIL NFR BLD: 1 % (ref 0.5–7.8)
ERYTHROCYTE [DISTWIDTH] IN BLOOD BY AUTOMATED COUNT: 14.3 % (ref 11.9–14.6)
GLUCOSE BLD STRIP.AUTO-MCNC: 113 MG/DL (ref 65–100)
GLUCOSE BLD STRIP.AUTO-MCNC: 114 MG/DL (ref 65–100)
GLUCOSE BLD STRIP.AUTO-MCNC: 117 MG/DL (ref 65–100)
GLUCOSE BLD STRIP.AUTO-MCNC: 133 MG/DL (ref 65–100)
GLUCOSE SERPL-MCNC: 124 MG/DL (ref 65–100)
HCT VFR BLD AUTO: 41 % (ref 41.1–50.3)
HGB BLD-MCNC: 13.8 G/DL (ref 13.6–17.2)
IMM GRANULOCYTES # BLD AUTO: 0.1 K/UL (ref 0–0.5)
IMM GRANULOCYTES NFR BLD AUTO: 0 % (ref 0–5)
LYMPHOCYTES # BLD: 2.2 K/UL (ref 0.5–4.6)
LYMPHOCYTES NFR BLD: 18 % (ref 13–44)
MCH RBC QN AUTO: 29.3 PG (ref 26.1–32.9)
MCHC RBC AUTO-ENTMCNC: 33.7 G/DL (ref 31.4–35)
MCV RBC AUTO: 87 FL (ref 82–102)
MONOCYTES # BLD: 0.8 K/UL (ref 0.1–1.3)
MONOCYTES NFR BLD: 6 % (ref 4–12)
NEUTS SEG # BLD: 8.9 K/UL (ref 1.7–8.2)
NEUTS SEG NFR BLD: 75 % (ref 43–78)
NRBC # BLD: 0 K/UL (ref 0–0.2)
PLATELET # BLD AUTO: 198 K/UL (ref 150–450)
PMV BLD AUTO: 9.8 FL (ref 9.4–12.3)
POTASSIUM SERPL-SCNC: 4 MMOL/L (ref 3.5–5.1)
RBC # BLD AUTO: 4.71 M/UL (ref 4.23–5.6)
SERVICE CMNT-IMP: ABNORMAL
SODIUM SERPL-SCNC: 137 MMOL/L (ref 133–143)
WBC # BLD AUTO: 12 K/UL (ref 4.3–11.1)

## 2023-11-10 PROCEDURE — G0378 HOSPITAL OBSERVATION PER HR: HCPCS

## 2023-11-10 PROCEDURE — 6360000002 HC RX W HCPCS: Performed by: UROLOGY

## 2023-11-10 PROCEDURE — 82962 GLUCOSE BLOOD TEST: CPT

## 2023-11-10 PROCEDURE — 2580000003 HC RX 258: Performed by: UROLOGY

## 2023-11-10 PROCEDURE — 80048 BASIC METABOLIC PNL TOTAL CA: CPT

## 2023-11-10 PROCEDURE — 99024 POSTOP FOLLOW-UP VISIT: CPT | Performed by: PHYSICIAN ASSISTANT

## 2023-11-10 PROCEDURE — 96361 HYDRATE IV INFUSION ADD-ON: CPT

## 2023-11-10 PROCEDURE — 96360 HYDRATION IV INFUSION INIT: CPT

## 2023-11-10 PROCEDURE — 6370000000 HC RX 637 (ALT 250 FOR IP): Performed by: UROLOGY

## 2023-11-10 PROCEDURE — 85025 COMPLETE CBC W/AUTO DIFF WBC: CPT

## 2023-11-10 PROCEDURE — 36415 COLL VENOUS BLD VENIPUNCTURE: CPT

## 2023-11-10 RX ADMIN — SODIUM CHLORIDE, PRESERVATIVE FREE 10 ML: 5 INJECTION INTRAVENOUS at 21:14

## 2023-11-10 RX ADMIN — SODIUM CHLORIDE, PRESERVATIVE FREE 10 ML: 5 INJECTION INTRAVENOUS at 09:49

## 2023-11-10 RX ADMIN — CEFAZOLIN SODIUM 2000 MG: 100 INJECTION, POWDER, LYOPHILIZED, FOR SOLUTION INTRAVENOUS at 16:36

## 2023-11-10 RX ADMIN — POTASSIUM CHLORIDE 20 MEQ: 1500 TABLET, EXTENDED RELEASE ORAL at 09:47

## 2023-11-10 RX ADMIN — FUROSEMIDE 40 MG: 40 TABLET ORAL at 09:48

## 2023-11-10 RX ADMIN — FINASTERIDE 5 MG: 5 TABLET, FILM COATED ORAL at 09:47

## 2023-11-10 RX ADMIN — OXYCODONE HYDROCHLORIDE AND ACETAMINOPHEN 1 TABLET: 5; 325 TABLET ORAL at 21:14

## 2023-11-10 RX ADMIN — METOPROLOL SUCCINATE 100 MG: 25 TABLET, EXTENDED RELEASE ORAL at 09:47

## 2023-11-10 RX ADMIN — SACUBITRIL AND VALSARTAN 1 TABLET: 97; 103 TABLET, FILM COATED ORAL at 21:38

## 2023-11-10 RX ADMIN — SODIUM CHLORIDE: 9 INJECTION, SOLUTION INTRAVENOUS at 05:40

## 2023-11-10 RX ADMIN — SACUBITRIL AND VALSARTAN 1 TABLET: 97; 103 TABLET, FILM COATED ORAL at 10:03

## 2023-11-10 RX ADMIN — DOCUSATE SODIUM 50 MG AND SENNOSIDES 8.6 MG 1 TABLET: 8.6; 5 TABLET, FILM COATED ORAL at 21:14

## 2023-11-10 RX ADMIN — ATORVASTATIN CALCIUM 20 MG: 20 TABLET, FILM COATED ORAL at 21:14

## 2023-11-10 RX ADMIN — CEFAZOLIN SODIUM 2000 MG: 100 INJECTION, POWDER, LYOPHILIZED, FOR SOLUTION INTRAVENOUS at 09:00

## 2023-11-10 RX ADMIN — TAMSULOSIN HYDROCHLORIDE 0.4 MG: 0.4 CAPSULE ORAL at 09:47

## 2023-11-10 RX ADMIN — CEFAZOLIN SODIUM 2000 MG: 100 INJECTION, POWDER, LYOPHILIZED, FOR SOLUTION INTRAVENOUS at 00:58

## 2023-11-10 RX ADMIN — DOCUSATE SODIUM 50 MG AND SENNOSIDES 8.6 MG 1 TABLET: 8.6; 5 TABLET, FILM COATED ORAL at 09:48

## 2023-11-10 NOTE — CARE COORDINATION
CM met with Mr. Martha Sloan in room 608 to discuss discharge planning. He is observation status (MOON signed) POD #1 TURP for benign prostatic hyperplasia with urinary retention. Prior to admit, he was independent with ADLs, living with his significant other Faith. At discharge, he will go home, no additional discharge needs voiced or identified. 11/10/23 1056   Service Assessment   Patient Orientation Alert and Oriented   Cognition Alert   History Provided By Patient   Primary Caregiver Self   Support Systems Spouse/Significant Other  (Beltran Mead)   PCP Verified by CM Yes   Prior Functional Level Independent in ADLs/IADLs   Current Functional Level Independent in ADLs/IADLs   Can patient return to prior living arrangement Yes   Ability to make needs known: Good   Family able to assist with home care needs: Yes   Would you like for me to discuss the discharge plan with any other family members/significant others, and if so, who?  No   Condition of Participation: Discharge Planning   The Plan for Transition of Care is related to the following treatment goals: home when stable

## 2023-11-10 NOTE — PROGRESS NOTES
CBI running at a moderate rate. Emptied a total of 8500 mLs of clear, pink urine. Minimal amount of urethral drainage when pt shifts positions. Pt did not need any insulin coverage for a blood sugar of 173. No c/o pain. Pt medicated per MAR. IVF infusing, pt tolerating well. All known needs met at this time. Bed locked and lowered with call light within reach.

## 2023-11-11 VITALS
HEIGHT: 76 IN | RESPIRATION RATE: 18 BRPM | BODY MASS INDEX: 28.08 KG/M2 | SYSTOLIC BLOOD PRESSURE: 120 MMHG | TEMPERATURE: 97.7 F | OXYGEN SATURATION: 97 % | HEART RATE: 71 BPM | DIASTOLIC BLOOD PRESSURE: 93 MMHG | WEIGHT: 230.6 LBS

## 2023-11-11 LAB
GLUCOSE BLD STRIP.AUTO-MCNC: 148 MG/DL (ref 65–100)
SERVICE CMNT-IMP: ABNORMAL

## 2023-11-11 PROCEDURE — G0378 HOSPITAL OBSERVATION PER HR: HCPCS

## 2023-11-11 PROCEDURE — 6360000002 HC RX W HCPCS: Performed by: UROLOGY

## 2023-11-11 PROCEDURE — 82962 GLUCOSE BLOOD TEST: CPT

## 2023-11-11 PROCEDURE — 6370000000 HC RX 637 (ALT 250 FOR IP): Performed by: UROLOGY

## 2023-11-11 PROCEDURE — 2580000003 HC RX 258: Performed by: UROLOGY

## 2023-11-11 RX ORDER — OXYCODONE AND ACETAMINOPHEN 10; 325 MG/1; MG/1
0.5 TABLET ORAL EVERY 6 HOURS PRN
Qty: 6 TABLET | Refills: 0 | Status: SHIPPED | OUTPATIENT
Start: 2023-11-11 | End: 2023-11-14

## 2023-11-11 RX ADMIN — CEFAZOLIN SODIUM 2000 MG: 100 INJECTION, POWDER, LYOPHILIZED, FOR SOLUTION INTRAVENOUS at 09:08

## 2023-11-11 RX ADMIN — FINASTERIDE 5 MG: 5 TABLET, FILM COATED ORAL at 09:07

## 2023-11-11 RX ADMIN — OXYCODONE HYDROCHLORIDE AND ACETAMINOPHEN 1 TABLET: 5; 325 TABLET ORAL at 01:30

## 2023-11-11 RX ADMIN — TAMSULOSIN HYDROCHLORIDE 0.4 MG: 0.4 CAPSULE ORAL at 09:07

## 2023-11-11 RX ADMIN — SACUBITRIL AND VALSARTAN 1 TABLET: 97; 103 TABLET, FILM COATED ORAL at 09:27

## 2023-11-11 RX ADMIN — DOCUSATE SODIUM 50 MG AND SENNOSIDES 8.6 MG 1 TABLET: 8.6; 5 TABLET, FILM COATED ORAL at 09:06

## 2023-11-11 RX ADMIN — SODIUM CHLORIDE, PRESERVATIVE FREE 10 ML: 5 INJECTION INTRAVENOUS at 09:14

## 2023-11-11 RX ADMIN — FUROSEMIDE 40 MG: 40 TABLET ORAL at 09:08

## 2023-11-11 RX ADMIN — OXYCODONE HYDROCHLORIDE AND ACETAMINOPHEN 1 TABLET: 5; 325 TABLET ORAL at 05:30

## 2023-11-11 RX ADMIN — CEFAZOLIN SODIUM 2000 MG: 100 INJECTION, POWDER, LYOPHILIZED, FOR SOLUTION INTRAVENOUS at 00:53

## 2023-11-11 RX ADMIN — POTASSIUM CHLORIDE 20 MEQ: 1500 TABLET, EXTENDED RELEASE ORAL at 09:07

## 2023-11-11 RX ADMIN — METOPROLOL SUCCINATE 100 MG: 25 TABLET, EXTENDED RELEASE ORAL at 09:06

## 2023-11-11 NOTE — PROGRESS NOTES
Patient is resting quietly in bed. Hourly rounds completed. All stated needs met. Bed L/L, call light and table within reach. Pain medicated per [facility] and was effective. UOP 1400 ml of clear red urine with few clots. CBI is clamped. Patient did not require any manual irrigation. No BM reported. Report to be given to oncoming nurse.      Damari Brooks RN

## 2023-11-11 NOTE — PLAN OF CARE
Problem: Chronic Conditions and Co-morbidities  Goal: Patient's chronic conditions and co-morbidity symptoms are monitored and maintained or improved  Outcome: Progressing  Flowsheets (Taken 11/10/2023 1920)  Care Plan - Patient's Chronic Conditions and Co-Morbidity Symptoms are Monitored and Maintained or Improved:   Monitor and assess patient's chronic conditions and comorbid symptoms for stability, deterioration, or improvement   Collaborate with multidisciplinary team to address chronic and comorbid conditions and prevent exacerbation or deterioration   Update acute care plan with appropriate goals if chronic or comorbid symptoms are exacerbated and prevent overall improvement and discharge     Problem: Pain  Goal: Verbalizes/displays adequate comfort level or baseline comfort level  Outcome: Progressing     Problem: Discharge Planning  Goal: Discharge to home or other facility with appropriate resources  Outcome: Progressing  Flowsheets (Taken 11/10/2023 1920)  Discharge to home or other facility with appropriate resources:   Identify barriers to discharge with patient and caregiver   Refer to discharge planning if patient needs post-hospital services based on physician order or complex needs related to functional status, cognitive ability or social support system   Identify discharge learning needs (meds, wound care, etc)   Arrange for needed discharge resources and transportation as appropriate     Problem: ABCDS Injury Assessment  Goal: Absence of physical injury  Outcome: Progressing     Problem: Safety - Adult  Goal: Free from fall injury  Outcome: Progressing

## 2023-11-11 NOTE — CARE COORDINATION
Pt is for discharge home today with family and no needs/supportive care orders recieved for CM at this time. 11/10/23 1056   Service Assessment   Patient Orientation Alert and Oriented   Cognition Alert   History Provided By Patient   Primary Caregiver Self   Support Systems Spouse/Significant Other  (Aakash Acosta)   PCP Verified by CM Yes   Prior Functional Level Independent in ADLs/IADLs   Current Functional Level Independent in ADLs/IADLs   Can patient return to prior living arrangement Yes   Ability to make needs known: Good   Family able to assist with home care needs: Yes   Would you like for me to discuss the discharge plan with any other family members/significant others, and if so, who? No   Services At/After Discharge   Transition of Care Consult (CM Consult) Discharge [facility] None   [place] Resource Information Provided? No   Mode of Transport at Discharge Other (see comment)  (Family)   Confirm Follow Up Transport Family   Condition of Participation: Discharge Planning   The Plan for Transition of Care is related to the following treatment goals: home when stable   The Patient and/Or Patient Representative agree with the Discharge Plan? Yes   Freedom of Choice list was provided with basic dialogue that supports the patient's individualized plan of care/goals, treatment preferences, and shares the quality data associated with the providers?   Yes

## 2023-11-11 NOTE — DISCHARGE SUMMARY
Discharge Summary    Date: 11/11/2023  Patient Name: Randi El    YOB: 1960     Age: 66 y.o. Admit Date: 11/9/2023  Discharge Date: 11/11/2023  Discharge Condition: Stable    Admission Diagnosis  BPH with obstruction/lower urinary tract symptoms [N40.1, N13.8]; BPH with urinary obstruction [N40.1, N13.8]      Discharge Diagnosis  Principal Problem:    BPH with obstruction/lower urinary tract symptoms  Active Problems:    BPH with urinary obstruction  Resolved Problems:    * No resolved hospital problems. [facility] Stay  Narrative of Hospital Course: The patient underwent transurethral resection of prostate. Procedure was without complications. Following the procedure, patient was transferred to PACU and admitted to the urology floor for convalescence. Postoperatively, the patient progressed as expected. CBI was weaned as urine cleared. On the day of discharge, mak was removed, and patient passed voiding trial.      Consultants:  None    Surgeries/procedures Performed:  Transurethral resection of prostate     Treatments:    Analgesia, Antibiotics, Surgery and IV Hydration        Discharge Plan/Disposition:  Home    Hospital/Incidental Findings Requiring Follow Up:    Patient Instructions:    Diet: Diabetic Diet    Activity:Activity as Tolerated  For number of days (if applicable): Other Instructions: Hold eliquis until 11/14. Pt advised to call cardiologist on Monday to determine if he will need to resume. Pt verbalized understanding. Provider Follow-Up:   No follow-ups on file.      Significant Diagnostic Studies:    Recent Labs:  Admission on 11/09/2023  Crossmatch expiration date                    Date: 11/09/2023  Value: 11/12/2023,2359                       Status: Final  ABO/Rh                                        Date: 11/09/2023  Value: A POSITIVE    Status: Final  Antibody Screen                               Date: 11/09/2023  Value: NEG           Status:

## 2023-11-22 ENCOUNTER — OFFICE VISIT (OUTPATIENT)
Dept: UROLOGY | Age: 63
End: 2023-11-22
Payer: MEDICARE

## 2023-11-22 DIAGNOSIS — R31.0 GROSS HEMATURIA: ICD-10-CM

## 2023-11-22 DIAGNOSIS — N30.00 ACUTE CYSTITIS WITHOUT HEMATURIA: ICD-10-CM

## 2023-11-22 DIAGNOSIS — N13.8 BPH WITH OBSTRUCTION/LOWER URINARY TRACT SYMPTOMS: Primary | ICD-10-CM

## 2023-11-22 DIAGNOSIS — N40.1 BPH WITH OBSTRUCTION/LOWER URINARY TRACT SYMPTOMS: ICD-10-CM

## 2023-11-22 DIAGNOSIS — R97.20 ELEVATED PSA: ICD-10-CM

## 2023-11-22 DIAGNOSIS — N40.1 BPH WITH OBSTRUCTION/LOWER URINARY TRACT SYMPTOMS: Primary | ICD-10-CM

## 2023-11-22 DIAGNOSIS — N13.8 BPH WITH OBSTRUCTION/LOWER URINARY TRACT SYMPTOMS: ICD-10-CM

## 2023-11-22 LAB
BILIRUBIN, URINE, POC: NEGATIVE
BLOOD URINE, POC: NORMAL
GLUCOSE URINE, POC: NEGATIVE
KETONES, URINE, POC: NEGATIVE
LEUKOCYTE ESTERASE, URINE, POC: NORMAL
NITRITE, URINE, POC: NEGATIVE
PH, URINE, POC: 6 (ref 4.6–8)
PROTEIN,URINE, POC: 300
SPECIFIC GRAVITY, URINE, POC: 1.02 (ref 1–1.03)
URINALYSIS CLARITY, POC: NORMAL
URINALYSIS COLOR, POC: NORMAL
UROBILINOGEN, POC: NORMAL

## 2023-11-22 PROCEDURE — 1036F TOBACCO NON-USER: CPT | Performed by: NURSE PRACTITIONER

## 2023-11-22 PROCEDURE — 99213 OFFICE O/P EST LOW 20 MIN: CPT | Performed by: NURSE PRACTITIONER

## 2023-11-22 PROCEDURE — G8427 DOCREV CUR MEDS BY ELIG CLIN: HCPCS | Performed by: NURSE PRACTITIONER

## 2023-11-22 PROCEDURE — 3017F COLORECTAL CA SCREEN DOC REV: CPT | Performed by: NURSE PRACTITIONER

## 2023-11-22 PROCEDURE — G8417 CALC BMI ABV UP PARAM F/U: HCPCS | Performed by: NURSE PRACTITIONER

## 2023-11-22 PROCEDURE — G8484 FLU IMMUNIZE NO ADMIN: HCPCS | Performed by: NURSE PRACTITIONER

## 2023-11-22 PROCEDURE — 81003 URINALYSIS AUTO W/O SCOPE: CPT | Performed by: NURSE PRACTITIONER

## 2023-11-22 RX ORDER — CEFDINIR 300 MG/1
300 CAPSULE ORAL 2 TIMES DAILY
Qty: 14 CAPSULE | Refills: 0 | Status: SHIPPED | OUTPATIENT
Start: 2023-11-22 | End: 2023-11-29

## 2023-11-22 ASSESSMENT — ENCOUNTER SYMPTOMS
BACK PAIN: 0
NAUSEA: 0

## 2023-11-22 NOTE — PROGRESS NOTES
[facility] Urology  [facility] [address] [facility], [address] [place] [address]  [phone]          Adolfo Allison  : 1960    Chief Complaint   Patient presents with    Follow-up          HPI     Adolfo Allison is a 66 y.o. male who was initially referred for urinary retention 23. Seen in the ER for inability to urinate. Breen cath placed. Started on Flomax 0.4 mg PO daily. He has NO prior h/o AUR. He reports LONG h/o weak stream and increased urinary frequency. No h/o BPH. No personal or family h/o urological CA. Former smoker. After Breen cath placement, he noticed mild gross hematuria. No flank pain, fever/chills, or n/v. Breen cath removed 2 weeks ago. Voiding well since. No recurrence of hematuria.  cc via US (23)     He reports his PCP did PSA 2023 and it was 4.3. advised this is elevated. Repeat PSA (23) 4.7     PMH of colon CA requiring partial colectomy and chemo. Pt is on supposed to be on Eliquis. Has been off of this since his recurrence of hematuria in September. He had recurrence of AUR on 23. Remained on flomax. In office cystoscopy 9/15/23 showed sign BPH. Had another episode of gross hematuria. Started on proscar 5 mg PO daily. S/P TURP 23. Path benign. Has been voiding well since Breen catheter was removed at hospital. He has some dysuria. Occ gross hematuria. No fever/chills, n/v, or diff voiding. Past Medical History:   Diagnosis Date    Arthritis     Atrial fibrillation [facility])     patient was taking eliquis. per patient he has not taken since \"last week of september\"    Chronic back pain     Colon cancer ([address])     Partial Colectomy and Chemo.     Depression 2015    managed well with meds    Diabetes ([address])     oral agent only/pt doesnt monitor BS at home/no s.s of hypoglycemia/    Former cigarette smoker     History of GI bleed     HTN (hypertension) 2014    controlled well with meds    Hypercholesteremia

## 2023-11-24 LAB
BACTERIA SPEC CULT: NORMAL
SERVICE CMNT-IMP: NORMAL

## 2024-02-02 NOTE — PROGRESS NOTES
2015    managed well with meds    Diabetes (HCC)     oral agent only/pt doesnt monitor BS at home/no s.s of hypoglycemia/    Former cigarette smoker     History of GI bleed     HTN (hypertension) 2014    controlled well with meds    Hypercholesteremia     controlled well with meds    Nonischemic cardiomyopathy (HCC)     Followed by Dr. Shah     Past Surgical History:   Procedure Laterality Date    CHOLECYSTECTOMY, LAPAROSCOPIC      COLONOSCOPY      SMALL INTESTINE SURGERY      TURP N/A 2023    CYSTOSCOPY, BIPOLAR TRANSURETHRAL RESECTION PROSTATE performed by Arron Clayton MD at [facility] MAIN OR    WISDOM TOOTH EXTRACTION       Family History   Problem Relation Age of Onset    Heart Disease Neg Hx     Cancer Mother         Pancreatic    No Known Problems Brother     No Known Problems Father     No Known Problems Sister     Diabetes Brother     No Known Problems Sister     Heart Attack Brother      Social History     Tobacco Use    Smoking status: Former     Current packs/day: 0.00     Average packs/day: 0.3 packs/day for 10.0 years (2.5 ttl pk-yrs)     Types: Cigarettes     Start date: 1996     Quit date: 2006     Years since quittin.9    Smokeless tobacco: Never   Substance Use Topics    Alcohol use: No       ROS:    Review of Systems   Cardiovascular:  Negative for chest pain and palpitations.   Respiratory:  Negative for shortness of breath.           PHYSICAL EXAM:   /88   Pulse 54   Ht 1.93 m (6' 4\")   Wt 103 kg (227 lb)   BMI 27.63 kg/m²      Wt Readings from Last 3 Encounters:   24 103 kg (227 lb)   23 104.6 kg (230 lb 9.6 oz)   23 99.9 kg (220 lb 3.2 oz)     BP Readings from Last 3 Encounters:   24 134/88   23 (!) 120/93   23 (!) 129/92     Pulse Readings from Last 3 Encounters:   24 54   23 71   23 76           Physical Exam  Constitutional:       Appearance: Normal appearance.   HENT:      Head: Normocephalic

## 2024-02-05 ENCOUNTER — OFFICE VISIT (OUTPATIENT)
Age: 64
End: 2024-02-05
Payer: MEDICARE

## 2024-02-05 VITALS
DIASTOLIC BLOOD PRESSURE: 88 MMHG | WEIGHT: 227 LBS | HEIGHT: 76 IN | HEART RATE: 54 BPM | BODY MASS INDEX: 27.64 KG/M2 | SYSTOLIC BLOOD PRESSURE: 134 MMHG

## 2024-02-05 DIAGNOSIS — I10 ESSENTIAL HYPERTENSION: ICD-10-CM

## 2024-02-05 DIAGNOSIS — I48.21 PERMANENT ATRIAL FIBRILLATION (HCC): ICD-10-CM

## 2024-02-05 DIAGNOSIS — I42.8 NONISCHEMIC CARDIOMYOPATHY (HCC): Primary | ICD-10-CM

## 2024-02-05 PROCEDURE — G8484 FLU IMMUNIZE NO ADMIN: HCPCS | Performed by: INTERNAL MEDICINE

## 2024-02-05 PROCEDURE — 3075F SYST BP GE 130 - 139MM HG: CPT | Performed by: INTERNAL MEDICINE

## 2024-02-05 PROCEDURE — 1036F TOBACCO NON-USER: CPT | Performed by: INTERNAL MEDICINE

## 2024-02-05 PROCEDURE — 99214 OFFICE O/P EST MOD 30 MIN: CPT | Performed by: INTERNAL MEDICINE

## 2024-02-05 PROCEDURE — G8427 DOCREV CUR MEDS BY ELIG CLIN: HCPCS | Performed by: INTERNAL MEDICINE

## 2024-02-05 PROCEDURE — 3017F COLORECTAL CA SCREEN DOC REV: CPT | Performed by: INTERNAL MEDICINE

## 2024-02-05 PROCEDURE — G8417 CALC BMI ABV UP PARAM F/U: HCPCS | Performed by: INTERNAL MEDICINE

## 2024-02-05 PROCEDURE — 3079F DIAST BP 80-89 MM HG: CPT | Performed by: INTERNAL MEDICINE

## 2024-02-05 RX ORDER — SACUBITRIL AND VALSARTAN 97; 103 MG/1; MG/1
1 TABLET, FILM COATED ORAL 2 TIMES DAILY
Qty: 60 TABLET | Refills: 11 | Status: SHIPPED | OUTPATIENT
Start: 2024-02-05

## 2024-02-05 RX ORDER — ASPIRIN 81 MG/1
81 TABLET ORAL DAILY
COMMUNITY
Start: 2024-02-05

## 2024-02-05 RX ORDER — ATORVASTATIN CALCIUM 20 MG/1
20 TABLET, FILM COATED ORAL DAILY
Qty: 90 TABLET | Refills: 3 | Status: SHIPPED | OUTPATIENT
Start: 2024-02-05

## 2024-02-05 RX ORDER — FUROSEMIDE 40 MG/1
40 TABLET ORAL DAILY
Qty: 90 TABLET | Refills: 3 | Status: SHIPPED | OUTPATIENT
Start: 2024-02-05

## 2024-02-05 RX ORDER — METOPROLOL SUCCINATE 100 MG/1
100 TABLET, EXTENDED RELEASE ORAL DAILY
Qty: 90 TABLET | Refills: 3 | Status: SHIPPED | OUTPATIENT
Start: 2024-02-05

## 2024-02-05 RX ORDER — POTASSIUM CHLORIDE 20 MEQ/1
20 TABLET, EXTENDED RELEASE ORAL DAILY
Qty: 90 TABLET | Refills: 3 | Status: SHIPPED | OUTPATIENT
Start: 2024-02-05

## 2024-02-05 ASSESSMENT — ENCOUNTER SYMPTOMS: SHORTNESS OF BREATH: 0

## 2024-02-05 NOTE — PATIENT INSTRUCTIONS
Patient Education        Atrial Fibrillation: Care Instructions  Overview     Atrial fibrillation is an irregular and often fast heartbeat. Treating this condition is important for several reasons. It can cause blood clots, which can travel from your heart to your brain and cause a stroke. An irregular heartbeat can also increase your risk for heart failure. If you have an episode of atrial fibrillation, you may feel a fluttering, racing, or pounding feeling in your chest called palpitations. You may feel short of breath, lightheaded, dizzy, or weak.  Treatment can help you feel better and prevent future problems. Treatments can slow the heart rate, control the heart rhythm, or help prevent stroke. You will likely take medicine. You may have a procedure, such as electrical cardioversion or catheter ablation.  You can live well and help manage atrial fibrillation by having a heart-healthy lifestyle. This lifestyle may help reduce symptoms and how often you have episodes.  Follow-up care is a key part of your treatment and safety. Be sure to make and go to all appointments, and call your doctor if you are having problems. It's also a good idea to know your test results and keep a list of the medicines you take.  How can you care for yourself at home?  Be safe with medicines.  Take your medicines exactly as prescribed. Call your doctor if you think you are having a problem with your medicine. You will get more details on the specific medicines your doctor prescribes.  If your doctor has given you a blood thinner to prevent a stroke, be sure you get instructions about how to take your medicine safely. Blood thinners can cause serious bleeding problems.  Do not take any vitamins, over-the-counter drugs, or herbal products without talking to your doctor first.  Have a heart-healthy lifestyle.  Try to quit or cut back on using tobacco and other nicotine products. This includes smoking and vaping. Avoid secondhand smoke

## 2024-03-25 ENCOUNTER — OFFICE VISIT (OUTPATIENT)
Dept: UROLOGY | Age: 64
End: 2024-03-25
Payer: MEDICARE

## 2024-03-25 DIAGNOSIS — N13.8 BPH WITH OBSTRUCTION/LOWER URINARY TRACT SYMPTOMS: Primary | ICD-10-CM

## 2024-03-25 DIAGNOSIS — N40.1 BPH WITH OBSTRUCTION/LOWER URINARY TRACT SYMPTOMS: Primary | ICD-10-CM

## 2024-03-25 LAB
BILIRUBIN, URINE, POC: NORMAL
BLOOD URINE, POC: NEGATIVE
GLUCOSE URINE, POC: NEGATIVE
KETONES, URINE, POC: NEGATIVE
LEUKOCYTE ESTERASE, URINE, POC: NEGATIVE
NITRITE, URINE, POC: NEGATIVE
PH, URINE, POC: 5.5 (ref 4.6–8)
PROTEIN,URINE, POC: 100
PVR, POC: 3 CC
SPECIFIC GRAVITY, URINE, POC: 1.02 (ref 1–1.03)
URINALYSIS CLARITY, POC: NORMAL
URINALYSIS COLOR, POC: NORMAL
UROBILINOGEN, POC: NORMAL

## 2024-03-25 PROCEDURE — 99213 OFFICE O/P EST LOW 20 MIN: CPT | Performed by: UROLOGY

## 2024-03-25 PROCEDURE — 81003 URINALYSIS AUTO W/O SCOPE: CPT | Performed by: UROLOGY

## 2024-03-25 PROCEDURE — 51798 US URINE CAPACITY MEASURE: CPT | Performed by: UROLOGY

## 2024-03-25 PROCEDURE — 3017F COLORECTAL CA SCREEN DOC REV: CPT | Performed by: UROLOGY

## 2024-03-25 PROCEDURE — G8417 CALC BMI ABV UP PARAM F/U: HCPCS | Performed by: UROLOGY

## 2024-03-25 PROCEDURE — G8427 DOCREV CUR MEDS BY ELIG CLIN: HCPCS | Performed by: UROLOGY

## 2024-03-25 PROCEDURE — G8484 FLU IMMUNIZE NO ADMIN: HCPCS | Performed by: UROLOGY

## 2024-03-25 PROCEDURE — 1036F TOBACCO NON-USER: CPT | Performed by: UROLOGY

## 2024-03-25 RX ORDER — FINASTERIDE 5 MG/1
5 TABLET, FILM COATED ORAL DAILY
Qty: 90 TABLET | Refills: 3 | Status: SHIPPED | OUTPATIENT
Start: 2024-03-25

## 2024-03-25 ASSESSMENT — ENCOUNTER SYMPTOMS
BACK PAIN: 0
NAUSEA: 0

## 2024-03-25 NOTE — PROGRESS NOTES
[facility] Urology  [address] [address]  [phone]          Edison Wright  : 1960    Chief Complaint   Patient presents with    Follow-up          HPI     Edison Wright is a 64 y.o. male with a PMH of BPH/urinary retention s/p TURP 23 who returns for follow up.     Today, he reports voiding well since TURP.  No longer catheter dependent.  Path negative for cancer.  He remains on flomax / finasteride.  Pleased with results.      PSA elevated 2023 but this was in the context of retention / mak catheter.     PVR: 3 cc    IPSS: 1     Lab Results   Component Value Date    PSA 4.7 (H) 2023       Past Medical History:   Diagnosis Date    Arthritis     Atrial fibrillation (HCC)     patient was taking eliquis. per patient he has not taken since \"last week of september\"    Chronic back pain     Colon cancer (HCC)     Partial Colectomy and Chemo.    Depression 2015    managed well with meds    Diabetes (HCC)     oral agent only/pt doesnt monitor BS at home/no s.s of hypoglycemia/    Former cigarette smoker     History of GI bleed     HTN (hypertension) 2014    controlled well with meds    Hypercholesteremia     controlled well with meds    Nonischemic cardiomyopathy (HCC)     Followed by Dr. Shah     Past Surgical History:   Procedure Laterality Date    CHOLECYSTECTOMY, LAPAROSCOPIC      COLONOSCOPY      SMALL INTESTINE SURGERY      TURP N/A 2023    CYSTOSCOPY, BIPOLAR TRANSURETHRAL RESECTION PROSTATE performed by Arron Clayton MD at [facility] MAIN OR    WISDOM TOOTH EXTRACTION       Current Outpatient Medications   Medication Sig Dispense Refill    finasteride (PROSCAR) 5 MG tablet Take 1 tablet by mouth daily 90 tablet 3    atorvastatin (LIPITOR) 20 MG tablet Take 1 tablet by mouth daily 90 tablet 3    furosemide (LASIX) 40 MG tablet Take 1 tablet by mouth daily 90 tablet 3    metoprolol succinate (TOPROL XL) 100 MG

## 2024-04-15 RX ORDER — TAMSULOSIN HYDROCHLORIDE 0.4 MG/1
CAPSULE ORAL DAILY
Qty: 90 CAPSULE | Refills: 3 | Status: SHIPPED | OUTPATIENT
Start: 2024-04-15

## 2024-07-25 ENCOUNTER — TELEPHONE (OUTPATIENT)
Age: 64
End: 2024-07-25

## 2024-07-25 NOTE — TELEPHONE ENCOUNTER
Cardiac Clearance        Physician or Practice Requesting:Dr TATYANA Juan   : office   Contact Phone Number: [phone]  Fax Number: [phone]  Date of Surgery/Procedure: TBD  Type of Surgery or Procedure: colo  Type of Anesthesia: profofol  Type of Clearance Requested: Cardiac Clearance and Medication Hold  Medication to Hold:Eliquis   Days to Hold: ?

## 2024-07-25 NOTE — TELEPHONE ENCOUNTER
Endoscopic procedures are low risk procedures.  For patients taking anticoagulant for atrial fibrillation, and who have not had cardioversion in the preceding 6 weeks or prior embolic stroke, it is low risk to hold:    Apixaban (Eliquis) for 24-48 hours prior. Resume 24 hours post unless there is a contraindication  Rivaroxoban (Xarelto) for 24-72 hours prior Resume 24 hours post unless there is a contraindication  Dabigatran (Pradaxa) for 24-72 hours prior Resume 24 hours post unless there is a contraindication  Warfarin (Coumadin/Jantoven) for 2-3 days prior. Resume at usual dose same day of procedure and notify anticoagulation clinic.     If the patient is anticoagulated for a mechanical valve, please evaluate with the primary cardiologist.   If the patient's anticoagulation regimen is administered or followed by another provider, please contact that practice.     Dental procedures are low risk procedures.  For patients taking anticoagulant for atrial fibrillation, and who have not had cardioversion in the preceding 6 weeks, it is low risk to hold:    For extraction of a single tooth - do not hold anticoagulants.   For extraction of multiple teeth, or more extensive oral procedures:    Apixaban (Eliquis) for 24-48 hours prior. Resume 24 hours post unless there is a contraindication  Rivaroxoban (Xarelto) for 24-72 hours prior Resume 24 hours post unless there is a contraindication  Dabigatran (Pradaxa) for 24-72 hours prior Resume 24 hours post unless there is a contraindication  Warfarin (Coumadin/Jantoven) for 2-3 days prior. Resume at usual dose same day of procedure and notify anticoagulation clinic.     If the patient is anticoagulated for a mechanical valve, please evaluate with the primary cardiologist.   If the patient's anticoagulation regimen is administered or followed by another provider, please contact that practice.       Cataract extraction is a low risk procedure.  Do not hold anticoagulation for

## 2024-10-20 VITALS
RESPIRATION RATE: 19 BRPM | SYSTOLIC BLOOD PRESSURE: 162 MMHG | HEART RATE: 64 BPM | TEMPERATURE: 98.1 F | DIASTOLIC BLOOD PRESSURE: 112 MMHG | OXYGEN SATURATION: 98 % | HEIGHT: 76 IN | BODY MASS INDEX: 26.79 KG/M2 | WEIGHT: 220 LBS

## 2024-10-20 ASSESSMENT — PAIN DESCRIPTION - LOCATION: LOCATION: BUTTOCKS

## 2024-10-20 ASSESSMENT — PAIN DESCRIPTION - DESCRIPTORS: DESCRIPTORS: SORE

## 2024-10-20 ASSESSMENT — PAIN - FUNCTIONAL ASSESSMENT: PAIN_FUNCTIONAL_ASSESSMENT: 0-10

## 2024-10-20 ASSESSMENT — PAIN SCALES - GENERAL: PAINLEVEL_OUTOF10: 9

## 2024-10-21 ENCOUNTER — HOSPITAL ENCOUNTER (EMERGENCY)
Age: 64
Discharge: HOME | End: 2024-10-21

## 2024-10-21 NOTE — ED TRIAGE NOTES
Patient arrives via self from home. Patient states \"I have something right between my butt cheeks and it will not go away.\" Patient states it has been there for approximately 1 week and it is painful.

## 2024-10-21 NOTE — ED NOTES
Patient comes to registration desk and says \"I'm going home and I'll go somewhere else tomorrow or maybe I'll be back in the next day or two.\" Patient walks outside by himself.     Christin Minor RN  10/21/24 0034

## 2025-01-06 ENCOUNTER — OFFICE VISIT (OUTPATIENT)
Dept: UROLOGY | Age: 65
End: 2025-01-06
Payer: MEDICARE

## 2025-01-06 DIAGNOSIS — R30.0 DYSURIA: ICD-10-CM

## 2025-01-06 DIAGNOSIS — N13.8 BPH WITH OBSTRUCTION/LOWER URINARY TRACT SYMPTOMS: ICD-10-CM

## 2025-01-06 DIAGNOSIS — R31.0 GROSS HEMATURIA: ICD-10-CM

## 2025-01-06 DIAGNOSIS — R31.0 GROSS HEMATURIA: Primary | ICD-10-CM

## 2025-01-06 DIAGNOSIS — N40.1 BPH WITH OBSTRUCTION/LOWER URINARY TRACT SYMPTOMS: ICD-10-CM

## 2025-01-06 DIAGNOSIS — N39.0 ACUTE UTI: ICD-10-CM

## 2025-01-06 LAB
BILIRUBIN, URINE, POC: NEGATIVE
BLOOD URINE, POC: ABNORMAL
GLUCOSE URINE, POC: NEGATIVE MG/DL
KETONES, URINE, POC: NEGATIVE MG/DL
LEUKOCYTE ESTERASE, URINE, POC: ABNORMAL
NITRITE, URINE, POC: POSITIVE
PH, URINE, POC: 5.5 (ref 4.6–8)
PROTEIN,URINE, POC: 300 MG/DL
PVR, POC: 0 CC
SPECIFIC GRAVITY, URINE, POC: 1.02 (ref 1–1.03)
URINALYSIS CLARITY, POC: ABNORMAL
URINALYSIS COLOR, POC: ABNORMAL
UROBILINOGEN, POC: ABNORMAL MG/DL

## 2025-01-06 PROCEDURE — M1308 PR FLU IMMUNIZE NO ADMIN: HCPCS | Performed by: NURSE PRACTITIONER

## 2025-01-06 PROCEDURE — 81003 URINALYSIS AUTO W/O SCOPE: CPT | Performed by: NURSE PRACTITIONER

## 2025-01-06 PROCEDURE — 1036F TOBACCO NON-USER: CPT | Performed by: NURSE PRACTITIONER

## 2025-01-06 PROCEDURE — G8427 DOCREV CUR MEDS BY ELIG CLIN: HCPCS | Performed by: NURSE PRACTITIONER

## 2025-01-06 PROCEDURE — 3017F COLORECTAL CA SCREEN DOC REV: CPT | Performed by: NURSE PRACTITIONER

## 2025-01-06 PROCEDURE — 99214 OFFICE O/P EST MOD 30 MIN: CPT | Performed by: NURSE PRACTITIONER

## 2025-01-06 PROCEDURE — 51798 US URINE CAPACITY MEASURE: CPT | Performed by: NURSE PRACTITIONER

## 2025-01-06 PROCEDURE — G8417 CALC BMI ABV UP PARAM F/U: HCPCS | Performed by: NURSE PRACTITIONER

## 2025-01-06 RX ORDER — SULFAMETHOXAZOLE AND TRIMETHOPRIM 800; 160 MG/1; MG/1
1 TABLET ORAL 2 TIMES DAILY
Qty: 14 TABLET | Refills: 0 | Status: SHIPPED | OUTPATIENT
Start: 2025-01-06 | End: 2025-01-13

## 2025-01-06 ASSESSMENT — ENCOUNTER SYMPTOMS
NAUSEA: 0
BACK PAIN: 0

## 2025-01-06 NOTE — PROGRESS NOTES
Frequency of Social Gatherings with Friends and Family: Not asked   Intimate Partner Violence: Unknown (3/19/2021)    Received from [employer], [employer]    Intimate Partner Violence     Fear of Current or Ex-Partner: Not asked     Emotionally Abused: Not asked     Physically Abused: Not asked     Sexually Abused: Not asked   Housing Stability: Low Risk  (11/9/2023)    Housing Stability Vital Sign     Unable to Pay for Housing in the Last Year: No     Number of Places Lived in the Last Year: 1     Unstable Housing in the Last Year: No     Family History   Problem Relation Age of Onset    Heart Disease Neg Hx     Cancer Mother         Pancreatic    No Known Problems Brother     No Known Problems Father     No Known Problems Sister     Diabetes Brother     No Known Problems Sister     Heart Attack Brother        Review of Systems  Constitutional:   Negative for fever.  GI:  Negative for nausea.  Genitourinary: Positive for urinary burning and hematuria.  Musculoskeletal:  Negative for back pain.      Urinalysis  UA - Dipstick  Results for orders placed or performed in visit on 01/06/25   AMB POC URINALYSIS DIP STICK AUTO W/O MICRO    Collection Time: 01/06/25  8:24 AM   Result Value Ref Range    Color (UA POC)      Clarity (UA POC)      Glucose, Urine, POC Negative Negative mg/dL    Bilirubin, Urine, POC Negative Negative    KETONES, Urine, POC Negative Negative mg/dL    Specific Gravity, Urine, POC 1.025 1.001 - 1.035    Blood (UA POC) Large     pH, Urine, POC 5.5 4.6 - 8.0    Protein, Urine,  Negative mg/dL    Urobilinogen, POC 0.2 mg/dL <1.1 mg/dL    Nitrite, Urine, POC Positive (A) Negative    Leukocyte Esterase, Urine, POC Large Negative       PHYSICAL EXAM    General appearance - well appearing and in no distress  Mental status - alert, oriented to person, place, and time  Neck - supple, no significant adenopathy  Chest/Lung-  Quiet, even and easy respiratory effort without use of accessory

## 2025-01-08 LAB
BACTERIA SPEC CULT: ABNORMAL
SERVICE CMNT-IMP: ABNORMAL

## 2025-01-22 ENCOUNTER — TELEPHONE (OUTPATIENT)
Dept: UROLOGY | Age: 65
End: 2025-01-22

## 2025-01-22 RX ORDER — SULFAMETHOXAZOLE AND TRIMETHOPRIM 800; 160 MG/1; MG/1
1 TABLET ORAL 2 TIMES DAILY
Qty: 20 TABLET | Refills: 0 | Status: SHIPPED | OUTPATIENT
Start: 2025-01-22 | End: 2025-02-01

## 2025-01-27 ENCOUNTER — TELEPHONE (OUTPATIENT)
Age: 65
End: 2025-01-27

## 2025-01-27 DIAGNOSIS — I42.8 NONISCHEMIC CARDIOMYOPATHY (HCC): Primary | ICD-10-CM

## 2025-01-27 DIAGNOSIS — I10 ESSENTIAL HYPERTENSION: ICD-10-CM

## 2025-01-27 DIAGNOSIS — E78.5 HLD (HYPERLIPIDEMIA): ICD-10-CM

## 2025-01-27 NOTE — TELEPHONE ENCOUNTER
Patient called stating he has the following request :    Need lab orders   Annual appt on 2/7  Question : Does patient need to fast beforehand    Please call and advise.

## 2025-01-31 DIAGNOSIS — N40.1 BPH WITH OBSTRUCTION/LOWER URINARY TRACT SYMPTOMS: ICD-10-CM

## 2025-01-31 DIAGNOSIS — N13.8 BPH WITH OBSTRUCTION/LOWER URINARY TRACT SYMPTOMS: ICD-10-CM

## 2025-01-31 DIAGNOSIS — I10 ESSENTIAL HYPERTENSION: ICD-10-CM

## 2025-01-31 DIAGNOSIS — I42.8 NONISCHEMIC CARDIOMYOPATHY (HCC): ICD-10-CM

## 2025-01-31 DIAGNOSIS — E78.5 HLD (HYPERLIPIDEMIA): ICD-10-CM

## 2025-01-31 LAB
ALBUMIN SERPL-MCNC: 4.1 G/DL (ref 3.2–4.6)
ALBUMIN/GLOB SERPL: 1.4 (ref 1–1.9)
ALP SERPL-CCNC: 78 U/L (ref 40–129)
ALT SERPL-CCNC: 24 U/L (ref 8–55)
ANION GAP SERPL CALC-SCNC: 16 MMOL/L (ref 7–16)
AST SERPL-CCNC: 24 U/L (ref 15–37)
BASOPHILS # BLD: 0.09 K/UL (ref 0–0.2)
BASOPHILS NFR BLD: 1.2 % (ref 0–2)
BILIRUB SERPL-MCNC: 0.5 MG/DL (ref 0–1.2)
BUN SERPL-MCNC: 13 MG/DL (ref 8–23)
CALCIUM SERPL-MCNC: 10 MG/DL (ref 8.8–10.2)
CHLORIDE SERPL-SCNC: 102 MMOL/L (ref 98–107)
CHOLEST SERPL-MCNC: 137 MG/DL (ref 0–200)
CO2 SERPL-SCNC: 24 MMOL/L (ref 20–29)
CREAT SERPL-MCNC: 1.66 MG/DL (ref 0.8–1.3)
DIFFERENTIAL METHOD BLD: ABNORMAL
EOSINOPHIL # BLD: 0.17 K/UL (ref 0–0.8)
EOSINOPHIL NFR BLD: 2.3 % (ref 0.5–7.8)
ERYTHROCYTE [DISTWIDTH] IN BLOOD BY AUTOMATED COUNT: 13.6 % (ref 11.9–14.6)
GLOBULIN SER CALC-MCNC: 2.9 G/DL (ref 2.3–3.5)
GLUCOSE SERPL-MCNC: 128 MG/DL (ref 70–99)
HCT VFR BLD AUTO: 49.2 % (ref 41.1–50.3)
HDLC SERPL-MCNC: 26 MG/DL (ref 40–60)
HDLC SERPL: 5.2 (ref 0–5)
HGB BLD-MCNC: 16.5 G/DL (ref 13.6–17.2)
IMM GRANULOCYTES # BLD AUTO: 0.02 K/UL (ref 0–0.5)
IMM GRANULOCYTES NFR BLD AUTO: 0.3 % (ref 0–5)
LDLC SERPL CALC-MCNC: 72 MG/DL (ref 0–100)
LYMPHOCYTES # BLD: 3.08 K/UL (ref 0.5–4.6)
LYMPHOCYTES NFR BLD: 41.7 % (ref 13–44)
MCH RBC QN AUTO: 29.2 PG (ref 26.1–32.9)
MCHC RBC AUTO-ENTMCNC: 33.5 G/DL (ref 31.4–35)
MCV RBC AUTO: 87.1 FL (ref 82–102)
MONOCYTES # BLD: 0.43 K/UL (ref 0.1–1.3)
MONOCYTES NFR BLD: 5.8 % (ref 4–12)
NEUTS SEG # BLD: 3.6 K/UL (ref 1.7–8.2)
NEUTS SEG NFR BLD: 48.7 % (ref 43–78)
NRBC # BLD: 0 K/UL (ref 0–0.2)
PLATELET # BLD AUTO: 214 K/UL (ref 150–450)
PMV BLD AUTO: 10.4 FL (ref 9.4–12.3)
POTASSIUM SERPL-SCNC: 4.2 MMOL/L (ref 3.5–5.1)
PROT SERPL-MCNC: 7 G/DL (ref 6.3–8.2)
PSA SERPL-MCNC: 3.6 NG/ML (ref 0–4)
RBC # BLD AUTO: 5.65 M/UL (ref 4.23–5.6)
SODIUM SERPL-SCNC: 141 MMOL/L (ref 136–145)
TRIGL SERPL-MCNC: 196 MG/DL (ref 0–150)
VLDLC SERPL CALC-MCNC: 39 MG/DL (ref 6–23)
WBC # BLD AUTO: 7.4 K/UL (ref 4.3–11.1)

## 2025-02-05 NOTE — PROGRESS NOTES
[facility] CARDIOLOGY  [address] [place] [address], [address]  [place] [address]  PHONE: [phone]      25    NAME:  Edison Wright  : 1960  MRN: [id number]         SUBJECTIVE:   Edison Wright is a 64 y.o. male seen for a follow up visit regarding the following:     Chief Complaint   Patient presents with    Follow-up    Cardiomyopathy    Hypertension            HPI:  Follow up  Follow-up, Cardiomyopathy, and Hypertension   .    Follow up NICM with HFrEF, recovered EF, limited insight. Afib.  Intolerant of AC d/t recurrent hematuria and rectal bleeding . I referred him 2024 for Watchman consultation and urged him to have family or friend present.  He no showed the appointment. He stopped taking the eliquis last year as noted, he was on aspirin and says he didn't go to the Watchman visit because the paper he read said they put a string in your heart and he didn't want that.      He had more hematuria recently that was deemed d/t a UTI.  He wants to go back on the Eliquis.     He denies cp, dyspnea.  Tinkers in his yard, not much physical activity       PAST CARDIAC HISTORY:    - afib -rate control, anticoagulation but failed to follow up   2021- Echo dilated LV/LA, EF 30-35%, moderate MR   LHC - nonobstructive coronary atherosclerosis   2022- EF 45-50%  2023       EF 59%, JALEN, mild MR, ind DF  2024       INTOLERANT OF AC D/T RECURRENT URINARY AND RECTAL BLEEDING - no showed his Watchman consult                Cardiac Medications       Beta Blockers Cardio-Selective       metoprolol succinate (TOPROL XL) 100 MG extended release tablet Take 1 tablet by mouth daily       Loop Diuretics       furosemide (LASIX) 40 MG tablet Take 1 tablet by mouth daily       HMG CoA Reductase Inhibitors       atorvastatin (LIPITOR) 20 MG tablet Take 1 tablet by mouth daily       Cardiovascular Agents Misc. - Combinations       sacubitril-valsartan (ENTRESTO)  MG per tablet Take 1

## 2025-02-07 ENCOUNTER — OFFICE VISIT (OUTPATIENT)
Age: 65
End: 2025-02-07
Payer: MEDICARE

## 2025-02-07 VITALS
WEIGHT: 233 LBS | SYSTOLIC BLOOD PRESSURE: 118 MMHG | BODY MASS INDEX: 28.37 KG/M2 | HEART RATE: 67 BPM | HEIGHT: 76 IN | DIASTOLIC BLOOD PRESSURE: 76 MMHG

## 2025-02-07 DIAGNOSIS — I10 ESSENTIAL HYPERTENSION: ICD-10-CM

## 2025-02-07 DIAGNOSIS — N18.30 CONTROLLED TYPE 2 DIABETES MELLITUS WITH STAGE 3 CHRONIC KIDNEY DISEASE, WITHOUT LONG-TERM CURRENT USE OF INSULIN (HCC): ICD-10-CM

## 2025-02-07 DIAGNOSIS — I42.8 NONISCHEMIC CARDIOMYOPATHY (HCC): Primary | ICD-10-CM

## 2025-02-07 DIAGNOSIS — E11.22 CONTROLLED TYPE 2 DIABETES MELLITUS WITH STAGE 3 CHRONIC KIDNEY DISEASE, WITHOUT LONG-TERM CURRENT USE OF INSULIN (HCC): ICD-10-CM

## 2025-02-07 DIAGNOSIS — I48.0 PAROXYSMAL ATRIAL FIBRILLATION (HCC): ICD-10-CM

## 2025-02-07 DIAGNOSIS — E78.2 MIXED HYPERLIPIDEMIA: ICD-10-CM

## 2025-02-07 PROCEDURE — 3078F DIAST BP <80 MM HG: CPT | Performed by: INTERNAL MEDICINE

## 2025-02-07 PROCEDURE — 93000 ELECTROCARDIOGRAM COMPLETE: CPT | Performed by: INTERNAL MEDICINE

## 2025-02-07 PROCEDURE — 3017F COLORECTAL CA SCREEN DOC REV: CPT | Performed by: INTERNAL MEDICINE

## 2025-02-07 PROCEDURE — 99214 OFFICE O/P EST MOD 30 MIN: CPT | Performed by: INTERNAL MEDICINE

## 2025-02-07 PROCEDURE — 1036F TOBACCO NON-USER: CPT | Performed by: INTERNAL MEDICINE

## 2025-02-07 PROCEDURE — 3074F SYST BP LT 130 MM HG: CPT | Performed by: INTERNAL MEDICINE

## 2025-02-07 PROCEDURE — G8427 DOCREV CUR MEDS BY ELIG CLIN: HCPCS | Performed by: INTERNAL MEDICINE

## 2025-02-07 PROCEDURE — 2022F DILAT RTA XM EVC RTNOPTHY: CPT | Performed by: INTERNAL MEDICINE

## 2025-02-07 PROCEDURE — 3046F HEMOGLOBIN A1C LEVEL >9.0%: CPT | Performed by: INTERNAL MEDICINE

## 2025-02-07 PROCEDURE — G8417 CALC BMI ABV UP PARAM F/U: HCPCS | Performed by: INTERNAL MEDICINE

## 2025-02-07 RX ORDER — FUROSEMIDE 40 MG/1
40 TABLET ORAL DAILY
Qty: 90 TABLET | Refills: 3 | Status: SHIPPED | OUTPATIENT
Start: 2025-02-07

## 2025-02-07 RX ORDER — ATORVASTATIN CALCIUM 20 MG/1
20 TABLET, FILM COATED ORAL DAILY
Qty: 90 TABLET | Refills: 3 | Status: SHIPPED | OUTPATIENT
Start: 2025-02-07

## 2025-02-07 RX ORDER — SACUBITRIL AND VALSARTAN 97; 103 MG/1; MG/1
1 TABLET, FILM COATED ORAL 2 TIMES DAILY
Qty: 60 TABLET | Refills: 11 | Status: SHIPPED | OUTPATIENT
Start: 2025-02-07

## 2025-02-07 RX ORDER — METOPROLOL SUCCINATE 100 MG/1
100 TABLET, EXTENDED RELEASE ORAL DAILY
Qty: 90 TABLET | Refills: 3 | Status: SHIPPED | OUTPATIENT
Start: 2025-02-07

## 2025-02-07 RX ORDER — POTASSIUM CHLORIDE 1500 MG/1
20 TABLET, EXTENDED RELEASE ORAL DAILY
Qty: 90 TABLET | Refills: 3 | Status: SHIPPED | OUTPATIENT
Start: 2025-02-07

## 2025-02-07 ASSESSMENT — ENCOUNTER SYMPTOMS: SHORTNESS OF BREATH: 0

## 2025-02-14 ENCOUNTER — TELEPHONE (OUTPATIENT)
Dept: UROLOGY | Age: 65
End: 2025-02-14

## 2025-02-14 RX ORDER — SULFAMETHOXAZOLE AND TRIMETHOPRIM 400; 80 MG/1; MG/1
1 TABLET ORAL 2 TIMES DAILY
Qty: 14 TABLET | Refills: 0 | Status: SHIPPED | OUTPATIENT
Start: 2025-02-14 | End: 2025-02-21

## 2025-02-14 NOTE — TELEPHONE ENCOUNTER
Patient states he is having burning and having some blood in his urine. His symptoms come back right after finishing antibiotics.. He is wanting another antibiotic. Please advise if that can be sent over or if he will need to schedule before his appt in march. Nazanin does not have anything any time soon. Sending to Talia seymour.

## 2025-02-14 NOTE — TELEPHONE ENCOUNTER
Refill sent. Will need to check with Nazanin on Monday about follow up.  May need OV and cystoscopy.     Talia

## 2025-02-28 RX ORDER — TAMSULOSIN HYDROCHLORIDE 0.4 MG/1
CAPSULE ORAL DAILY
Qty: 90 CAPSULE | Refills: 3 | Status: SHIPPED | OUTPATIENT
Start: 2025-02-28

## 2025-03-24 ENCOUNTER — OFFICE VISIT (OUTPATIENT)
Dept: UROLOGY | Age: 65
End: 2025-03-24
Payer: MEDICARE

## 2025-03-24 DIAGNOSIS — N40.1 BPH WITH OBSTRUCTION/LOWER URINARY TRACT SYMPTOMS: ICD-10-CM

## 2025-03-24 DIAGNOSIS — N39.0 ACUTE UTI: ICD-10-CM

## 2025-03-24 DIAGNOSIS — R31.0 GROSS HEMATURIA: Primary | ICD-10-CM

## 2025-03-24 DIAGNOSIS — R30.0 DYSURIA: ICD-10-CM

## 2025-03-24 DIAGNOSIS — N13.8 BPH WITH OBSTRUCTION/LOWER URINARY TRACT SYMPTOMS: ICD-10-CM

## 2025-03-24 DIAGNOSIS — R97.20 ELEVATED PSA: ICD-10-CM

## 2025-03-24 LAB
BILIRUBIN, URINE, POC: NEGATIVE
BLOOD URINE, POC: NORMAL
GLUCOSE URINE, POC: NEGATIVE MG/DL
KETONES, URINE, POC: NEGATIVE MG/DL
LEUKOCYTE ESTERASE, URINE, POC: NORMAL
NITRITE, URINE, POC: NEGATIVE
PH, URINE, POC: 5.5 (ref 4.6–8)
PROTEIN,URINE, POC: NORMAL MG/DL
PVR, POC: 76 CC
SPECIFIC GRAVITY, URINE, POC: 1.01 (ref 1–1.03)
URINALYSIS CLARITY, POC: NORMAL
URINALYSIS COLOR, POC: NORMAL
UROBILINOGEN, POC: NORMAL MG/DL

## 2025-03-24 PROCEDURE — 1036F TOBACCO NON-USER: CPT | Performed by: UROLOGY

## 2025-03-24 PROCEDURE — 51798 US URINE CAPACITY MEASURE: CPT | Performed by: UROLOGY

## 2025-03-24 PROCEDURE — 81003 URINALYSIS AUTO W/O SCOPE: CPT | Performed by: UROLOGY

## 2025-03-24 PROCEDURE — G8417 CALC BMI ABV UP PARAM F/U: HCPCS | Performed by: UROLOGY

## 2025-03-24 PROCEDURE — 99214 OFFICE O/P EST MOD 30 MIN: CPT | Performed by: UROLOGY

## 2025-03-24 PROCEDURE — 3017F COLORECTAL CA SCREEN DOC REV: CPT | Performed by: UROLOGY

## 2025-03-24 PROCEDURE — G8427 DOCREV CUR MEDS BY ELIG CLIN: HCPCS | Performed by: UROLOGY

## 2025-03-24 RX ORDER — TAMSULOSIN HYDROCHLORIDE 0.4 MG/1
0.4 CAPSULE ORAL DAILY
Qty: 90 CAPSULE | Refills: 3 | Status: SHIPPED | OUTPATIENT
Start: 2025-03-24 | End: 2025-03-24 | Stop reason: SDUPTHER

## 2025-03-24 RX ORDER — SULFAMETHOXAZOLE AND TRIMETHOPRIM 800; 160 MG/1; MG/1
1 TABLET ORAL 2 TIMES DAILY
Qty: 14 TABLET | Refills: 1 | Status: SHIPPED | OUTPATIENT
Start: 2025-03-24 | End: 2025-03-31

## 2025-03-24 RX ORDER — TAMSULOSIN HYDROCHLORIDE 0.4 MG/1
0.4 CAPSULE ORAL DAILY
Qty: 90 CAPSULE | Refills: 3 | Status: SHIPPED | OUTPATIENT
Start: 2025-03-24

## 2025-03-24 RX ORDER — FINASTERIDE 5 MG/1
5 TABLET, FILM COATED ORAL DAILY
Qty: 90 TABLET | Refills: 3 | Status: SHIPPED | OUTPATIENT
Start: 2025-03-24 | End: 2025-03-24 | Stop reason: SDUPTHER

## 2025-03-24 RX ORDER — FINASTERIDE 5 MG/1
5 TABLET, FILM COATED ORAL DAILY
Qty: 90 TABLET | Refills: 3 | Status: SHIPPED | OUTPATIENT
Start: 2025-03-24

## 2025-03-25 ASSESSMENT — ENCOUNTER SYMPTOMS
HEARTBURN: 0
INDIGESTION: 0
NAUSEA: 0
WHEEZING: 0
BLOOD IN STOOL: 0
BACK PAIN: 0
EYE DISCHARGE: 0
SKIN LESIONS: 0
SHORTNESS OF BREATH: 0
CONSTIPATION: 0
ABDOMINAL PAIN: 0
VOMITING: 0
COUGH: 0
DIARRHEA: 0
EYE PAIN: 0

## 2025-03-26 LAB
BACTERIA SPEC CULT: ABNORMAL
SERVICE CMNT-IMP: ABNORMAL

## 2025-03-26 NOTE — PROGRESS NOTES
[facility] Urology  [address]   [address]  [place] [address]  [phone]    Edison Wright  : 1960    Chief Complaint   Patient presents with    Follow-up          HPI     Edison Wright is a 65 y.o. male    History of Present Illness  The patient is a 65-year-old gentleman who returns for follow-up today. He has a history of complete urinary retention and underwent a TURP in 2023. Since the surgery, he has been voiding on his own and is on Flomax 0.4 mg daily and finasteride 5 mg daily. Tried stopping them but symptoms worsened without them.  He suspects a recurrent UTI due to persistent burning sensations during urination. He had a UTI in 2025 that required two rounds of antibiotics and believes it has now returned.    He reports experiencing urinary tract infections over the past few months, which have been managed with antibiotics. However, he notes that the symptoms tend to recur once the antibiotic course is completed. He describes a burning sensation during urination, which is alleviated by the antibiotics. He expresses concern about the recurrent nature of his symptoms. He maintains good hydration, particularly when on antibiotics, and ensures regular urination, often more frequently than every 2 hours. He continues to take his prescribed medications, Flomax and finasteride, but reports occasional pain.    MEDICATIONS  Flomax 0.4 mg daily, finasteride 5 mg daily    Lab Results   Component Value Date    PSA 3.6 2025    PSA 4.7 (H) 2023           Past Medical History:   Diagnosis Date    Arthritis     Atrial fibrillation (HCC)     patient was taking eliquis. per patient he has not taken since \"last week of september\"    Chronic back pain     Colon cancer (HCC)     Partial Colectomy and Chemo.    Depression 2015    managed well with meds    Diabetes (HCC)     oral agent only/pt doesnt monitor BS at home/no s.s of hypoglycemia/

## 2025-03-27 ENCOUNTER — RESULTS FOLLOW-UP (OUTPATIENT)
Dept: UROLOGY | Age: 65
End: 2025-03-27

## 2025-03-27 NOTE — RESULT ENCOUNTER NOTE
Rayna, please let Mr. Wright know that his urine culture did show a UTI.  The bactrim antibiotic will work to treat it.  He should complete that as prescribed    Thanks!Forrest

## 2025-05-05 ENCOUNTER — TELEPHONE (OUTPATIENT)
Dept: UROLOGY | Age: 65
End: 2025-05-05

## 2025-05-05 DIAGNOSIS — N39.0 ACUTE UTI: Primary | ICD-10-CM

## 2025-05-05 NOTE — TELEPHONE ENCOUNTER
Patient just finished antibiotics for prostate infection. His wife states as soon as he finishes an antibiotic it gets difficult for him to urinate and he has burning as well.

## 2025-05-06 ENCOUNTER — TELEPHONE (OUTPATIENT)
Dept: UROLOGY | Age: 65
End: 2025-05-06

## 2025-05-06 DIAGNOSIS — R31.29 MICROSCOPIC HEMATURIA: Primary | ICD-10-CM

## 2025-05-06 NOTE — TELEPHONE ENCOUNTER
Patient continues to have UTIs.     Will get another urine specimen today to check for UTI    Will schedule cysto + CT to eval urinary tract for source of UTIs.     Arron Clayton M.D.    [facility]  [place] [id number]  Phone: [phone]  Fax: [phone]

## 2025-05-08 NOTE — TELEPHONE ENCOUNTER
Called and spoke to patient on 5/6 and let him know Dr. Clayton's recommendations.  UA and culture ordered and gave patient instructions on going to outpatient lab.  Cystoscopy scheduled.

## 2025-05-29 ENCOUNTER — TELEPHONE (OUTPATIENT)
Dept: UROLOGY | Age: 65
End: 2025-05-29

## 2025-05-29 ENCOUNTER — PROCEDURE VISIT (OUTPATIENT)
Dept: UROLOGY | Age: 65
End: 2025-05-29

## 2025-05-29 DIAGNOSIS — N13.8 BENIGN PROSTATIC HYPERPLASIA WITH URINARY OBSTRUCTION: ICD-10-CM

## 2025-05-29 DIAGNOSIS — N40.1 BENIGN PROSTATIC HYPERPLASIA WITH URINARY OBSTRUCTION: ICD-10-CM

## 2025-05-29 DIAGNOSIS — N21.0 BLADDER STONE: ICD-10-CM

## 2025-05-29 DIAGNOSIS — N39.0 RECURRENT UTI: ICD-10-CM

## 2025-05-29 DIAGNOSIS — R31.29 MICROSCOPIC HEMATURIA: Primary | ICD-10-CM

## 2025-05-29 LAB
BILIRUBIN, URINE, POC: NEGATIVE
BLOOD URINE, POC: NORMAL
GLUCOSE URINE, POC: NEGATIVE MG/DL
KETONES, URINE, POC: NORMAL MG/DL
LEUKOCYTE ESTERASE, URINE, POC: NORMAL
NITRITE, URINE, POC: NEGATIVE
PH, URINE, POC: 6 (ref 4.6–8)
PROTEIN,URINE, POC: 100 MG/DL
SPECIFIC GRAVITY, URINE, POC: 1.02 (ref 1–1.03)
URINALYSIS CLARITY, POC: NORMAL
URINALYSIS COLOR, POC: NORMAL
UROBILINOGEN, POC: NORMAL MG/DL

## 2025-05-29 NOTE — TELEPHONE ENCOUNTER
----- Message from Dr. Arron Clayton MD sent at 5/29/2025  2:41 PM EDT -----  Regarding: SUrgery Scheduling  Hospital: [facility]    Surgeon: Forrest    Assist: NONE    Diagnosis: Bladder Stone and BPH    Procedure: cystolitholapaxy + bipolar transurethral resection of prostate    Posting time: 90 minutes    Special Instruments Needed:  NONE    Anesthesia: GENERAL    Labs: CBC, BMP, U/A    Tests: EKG per anesthesia    Blood: NONE    Bowel Prep: NONE    Special Instructions: need tube for anesthesia.     Orders/HandP:     Follow up appointment: 2 weeks NP and 4 months with me.

## 2025-05-30 ASSESSMENT — ENCOUNTER SYMPTOMS
CONSTIPATION: 0
NAUSEA: 0
WHEEZING: 0
EYE DISCHARGE: 0
COUGH: 0
SKIN LESIONS: 0
VOMITING: 0
DIARRHEA: 0
EYE PAIN: 0
ABDOMINAL PAIN: 0
BACK PAIN: 0
SHORTNESS OF BREATH: 0
INDIGESTION: 0
BLOOD IN STOOL: 0
HEARTBURN: 0

## 2025-06-05 ENCOUNTER — PREP FOR PROCEDURE (OUTPATIENT)
Dept: UROLOGY | Age: 65
End: 2025-06-05

## 2025-06-05 ENCOUNTER — TELEPHONE (OUTPATIENT)
Dept: UROLOGY | Age: 65
End: 2025-06-05

## 2025-06-05 DIAGNOSIS — R35.0 BENIGN PROSTATIC HYPERPLASIA WITH URINARY FREQUENCY: Primary | ICD-10-CM

## 2025-06-05 DIAGNOSIS — N40.1 BENIGN PROSTATIC HYPERPLASIA WITH URINARY FREQUENCY: Primary | ICD-10-CM

## 2025-06-05 PROBLEM — N21.0 BLADDER STONE: Status: ACTIVE | Noted: 2025-05-29

## 2025-06-05 RX ORDER — SODIUM CHLORIDE 0.9 % (FLUSH) 0.9 %
5-40 SYRINGE (ML) INJECTION PRN
Status: CANCELLED | OUTPATIENT
Start: 2025-06-05

## 2025-06-05 RX ORDER — SODIUM CHLORIDE 9 MG/ML
INJECTION, SOLUTION INTRAVENOUS PRN
Status: CANCELLED | OUTPATIENT
Start: 2025-06-05

## 2025-06-05 RX ORDER — SODIUM CHLORIDE 0.9 % (FLUSH) 0.9 %
5-40 SYRINGE (ML) INJECTION EVERY 12 HOURS SCHEDULED
Status: CANCELLED | OUTPATIENT
Start: 2025-06-05

## 2025-06-05 NOTE — TELEPHONE ENCOUNTER
Cardiac Pre-operative Assessment      Physician or Practice Requesting Medication Clearance or Risk Assessment:     : Josefa     Contact Phone Number: [phone]    Fax Number: [phone]    Date of Surgery/Procedure: 7/8    Type of Surgery or Procedure: turp       Medications to hold Eliquis    # Days pre-procedure to hold 3    Restart medication _    Risk assessment      Please send the response back in this phone encounter / do not need to fax

## 2025-06-05 NOTE — TELEPHONE ENCOUNTER
Procedures: Procedure(s) with comments:   CYSTOSCOPY LITHOLAPAXY - NEED TUBE   CYSTOSCOPY TRANSURETHRAL RESECTION PROSTATE BIPOLAR   Date: 7/8/2025   Time: 1300   Location: [facility] MAIN OR  CYSTO

## 2025-06-05 NOTE — TELEPHONE ENCOUNTER
Clearance sent to get approval to hold eliquis 3 days prior    Request sent to scheduling    Post ops scheduled

## 2025-06-08 ENCOUNTER — RESULTS FOLLOW-UP (OUTPATIENT)
Dept: UROLOGY | Age: 65
End: 2025-06-08

## 2025-06-23 ENCOUNTER — OFFICE VISIT (OUTPATIENT)
Dept: UROLOGY | Age: 65
End: 2025-06-23
Payer: MEDICARE

## 2025-06-23 DIAGNOSIS — R31.29 MICROSCOPIC HEMATURIA: ICD-10-CM

## 2025-06-23 DIAGNOSIS — N21.0 BLADDER STONE: ICD-10-CM

## 2025-06-23 DIAGNOSIS — N28.89 RIGHT KIDNEY MASS: ICD-10-CM

## 2025-06-23 DIAGNOSIS — R35.0 BENIGN PROSTATIC HYPERPLASIA WITH URINARY FREQUENCY: Primary | ICD-10-CM

## 2025-06-23 DIAGNOSIS — N39.0 RECURRENT UTI: ICD-10-CM

## 2025-06-23 DIAGNOSIS — N40.1 BENIGN PROSTATIC HYPERPLASIA WITH URINARY FREQUENCY: Primary | ICD-10-CM

## 2025-06-23 LAB
BILIRUBIN, URINE, POC: NEGATIVE
BLOOD URINE, POC: NORMAL
GLUCOSE URINE, POC: NEGATIVE MG/DL
KETONES, URINE, POC: NEGATIVE MG/DL
LEUKOCYTE ESTERASE, URINE, POC: NORMAL
NITRITE, URINE, POC: NEGATIVE
PH, URINE, POC: 5.5 (ref 4.6–8)
PROTEIN,URINE, POC: 30 MG/DL
SPECIFIC GRAVITY, URINE, POC: 1.01 (ref 1–1.03)
URINALYSIS CLARITY, POC: NORMAL
URINALYSIS COLOR, POC: NORMAL
UROBILINOGEN, POC: NORMAL MG/DL

## 2025-06-23 PROCEDURE — 1036F TOBACCO NON-USER: CPT | Performed by: UROLOGY

## 2025-06-23 PROCEDURE — 81003 URINALYSIS AUTO W/O SCOPE: CPT | Performed by: UROLOGY

## 2025-06-23 PROCEDURE — G8427 DOCREV CUR MEDS BY ELIG CLIN: HCPCS | Performed by: UROLOGY

## 2025-06-23 PROCEDURE — 99215 OFFICE O/P EST HI 40 MIN: CPT | Performed by: UROLOGY

## 2025-06-23 PROCEDURE — G8417 CALC BMI ABV UP PARAM F/U: HCPCS | Performed by: UROLOGY

## 2025-06-23 PROCEDURE — 3017F COLORECTAL CA SCREEN DOC REV: CPT | Performed by: UROLOGY

## 2025-06-23 ASSESSMENT — ENCOUNTER SYMPTOMS
EYE PAIN: 0
COUGH: 0
ABDOMINAL PAIN: 0
BACK PAIN: 0
WHEEZING: 0
SHORTNESS OF BREATH: 0
NAUSEA: 0
SKIN LESIONS: 0
BLOOD IN STOOL: 0
DIARRHEA: 0
VOMITING: 0
INDIGESTION: 0
EYE DISCHARGE: 0
CONSTIPATION: 0
HEARTBURN: 0

## 2025-06-23 NOTE — PROGRESS NOTES
[facility]rology  [address] [place] [address]   [address]  [place] [address]  [phone]    Edison Wright  : 1960    Chief Complaint   Patient presents with    Follow-up          HPI     Edison Wright is a 65 y.o. male    History of Present Illness  The patient is a 65-year-old male with a history of urinary tract infections (UTIs) status post transurethral resection of the prostate (TURP) in , currently on Flomax and finasteride daily. He is scheduled for a cystoscopy, litholapaxy, and repeat TURP in 2025 due to a recent workup for UTIs that revealed a 2 cm bladder stone as a likely source.     During the recent workup in 2025, a CT abdomen and pelvis were performed due to hematuria, which incidentally revealed two masses on the right kidney, measuring 2 cm and 1 cm, concerning for renal cell carcinoma. These masses are classified as stage I and are confined to the kidney. He also has chronic kidney disease (CKD) with a GFR of 46 and baseline creatinine of 1.66. Additionally, he has a benign lipid-rich right adrenal adenoma without concern for metastatic disease.    He reports experiencing abdominal discomfort and pain. He has discontinued his Eliquis medication since his last surgery in , during which he was fitted with a catheter. He recalls an incident where he had to seek emergency care due to an inability to urinate, despite increased water intake, which resulted in bladder distension and fluttering sensations.    PAST SURGICAL HISTORY:  TURP in     Lab Results   Component Value Date    PSA 3.6 2025    PSA 4.7 (H) 2023           Past Medical History:   Diagnosis Date    Arthritis     Atrial fibrillation (HCC)     patient was taking eliquis. per patient he has not taken since \"last week of september\"    Chronic back pain     Colon cancer (HCC)     Partial Colectomy and Chemo.    Depression 2015    managed well with meds    Diabetes (HCC)

## 2025-07-01 ENCOUNTER — TELEPHONE (OUTPATIENT)
Dept: SURGERY | Age: 65
End: 2025-07-01

## 2025-07-01 ENCOUNTER — OFFICE VISIT (OUTPATIENT)
Age: 65
End: 2025-07-01

## 2025-07-01 ENCOUNTER — HOSPITAL ENCOUNTER (OUTPATIENT)
Dept: SURGERY | Age: 65
Discharge: HOME | End: 2025-07-04
Payer: MEDICARE

## 2025-07-01 ENCOUNTER — ANESTHESIA EVENT (OUTPATIENT)
Dept: SURGERY | Age: 65
End: 2025-07-01
Payer: MEDICARE

## 2025-07-01 ENCOUNTER — TELEPHONE (OUTPATIENT)
Dept: UROLOGY | Age: 65
End: 2025-07-01

## 2025-07-01 VITALS
WEIGHT: 230 LBS | DIASTOLIC BLOOD PRESSURE: 90 MMHG | HEIGHT: 76 IN | HEART RATE: 58 BPM | BODY MASS INDEX: 28.01 KG/M2 | SYSTOLIC BLOOD PRESSURE: 145 MMHG

## 2025-07-01 VITALS
WEIGHT: 230.2 LBS | HEIGHT: 77 IN | TEMPERATURE: 98.4 F | OXYGEN SATURATION: 96 % | BODY MASS INDEX: 27.18 KG/M2 | SYSTOLIC BLOOD PRESSURE: 115 MMHG | RESPIRATION RATE: 17 BRPM | DIASTOLIC BLOOD PRESSURE: 83 MMHG | HEART RATE: 80 BPM

## 2025-07-01 DIAGNOSIS — N21.0 BLADDER STONE: ICD-10-CM

## 2025-07-01 DIAGNOSIS — N28.9 RENAL INSUFFICIENCY: ICD-10-CM

## 2025-07-01 DIAGNOSIS — N28.89 RIGHT KIDNEY MASS: Primary | ICD-10-CM

## 2025-07-01 DIAGNOSIS — N40.1 BENIGN PROSTATIC HYPERPLASIA WITH URINARY FREQUENCY: ICD-10-CM

## 2025-07-01 DIAGNOSIS — N39.0 RECURRENT URINARY TRACT INFECTION: ICD-10-CM

## 2025-07-01 DIAGNOSIS — R35.0 BENIGN PROSTATIC HYPERPLASIA WITH URINARY FREQUENCY: ICD-10-CM

## 2025-07-01 DIAGNOSIS — N40.0 BENIGN PROSTATIC HYPERPLASIA, UNSPECIFIED WHETHER LOWER URINARY TRACT SYMPTOMS PRESENT: ICD-10-CM

## 2025-07-01 DIAGNOSIS — N30.00 ACUTE CYSTITIS WITHOUT HEMATURIA: Primary | ICD-10-CM

## 2025-07-01 LAB
ANION GAP SERPL CALC-SCNC: 13 MMOL/L (ref 7–16)
BACTERIA URNS QL MICRO: ABNORMAL /HPF
BILIRUB UR QL: NEGATIVE
BUN SERPL-MCNC: 14 MG/DL (ref 8–23)
CALCIUM SERPL-MCNC: 9.1 MG/DL (ref 8.8–10.2)
CHLORIDE SERPL-SCNC: 104 MMOL/L (ref 98–107)
COLOR UR: ABNORMAL
CREAT SERPL-MCNC: 1.09 MG/DL (ref 0.8–1.3)
EKG DIAGNOSIS: NORMAL
EKG Q-T INTERVAL: 384 MS
EKG QRS DURATION: 98 MS
EKG QTC CALCULATION (BAZETT): 392 MS
EKG VENTRICULAR RATE: 63 BPM
EPI CELLS #/AREA URNS HPF: ABNORMAL /HPF
ERYTHROCYTE [DISTWIDTH] IN BLOOD BY AUTOMATED COUNT: 13.3 % (ref 11.9–14.6)
GLUCOSE SERPL-MCNC: 151 MG/DL (ref 70–99)
GLUCOSE UR STRIP.AUTO-MCNC: NEGATIVE MG/DL
HCT VFR BLD AUTO: 46 % (ref 41.1–50.3)
HGB BLD-MCNC: 15.3 G/DL (ref 13.6–17.2)
HGB UR QL STRIP: NEGATIVE
KETONES UR QL STRIP.AUTO: NEGATIVE MG/DL
LEUKOCYTE ESTERASE UR QL STRIP.AUTO: ABNORMAL
MCH RBC QN AUTO: 29.5 PG (ref 26.1–32.9)
NITRITE UR QL STRIP.AUTO: NEGATIVE
NRBC # BLD: 0 K/UL (ref 0–0.2)
PH UR STRIP: 5.5 (ref 5–9)
PLATELET # BLD AUTO: 200 K/UL (ref 150–450)
POTASSIUM SERPL-SCNC: 3.5 MMOL/L (ref 3.5–5.1)
PROT UR STRIP-MCNC: 30 MG/DL
RBC # BLD AUTO: 5.19 M/UL (ref 4.23–5.6)
SODIUM SERPL-SCNC: 141 MMOL/L (ref 136–145)
SP GR UR REFRACTOMETRY: 1.02 (ref 1–1.02)
UROBILINOGEN UR QL STRIP.AUTO: 0.2 EU/DL (ref 0.2–1)
WBC # BLD AUTO: 8.8 K/UL (ref 4.3–11.1)
WBC URNS QL MICRO: ABNORMAL /HPF

## 2025-07-01 PROCEDURE — 87186 SC STD MICRODIL/AGAR DIL: CPT

## 2025-07-01 PROCEDURE — 81001 URINALYSIS AUTO W/SCOPE: CPT

## 2025-07-01 PROCEDURE — 87086 URINE CULTURE/COLONY COUNT: CPT

## 2025-07-01 PROCEDURE — 87088 URINE BACTERIA CULTURE: CPT

## 2025-07-01 PROCEDURE — 1036F TOBACCO NON-USER: CPT | Performed by: RADIOLOGY

## 2025-07-01 PROCEDURE — 3017F COLORECTAL CA SCREEN DOC REV: CPT | Performed by: RADIOLOGY

## 2025-07-01 PROCEDURE — 93005 ELECTROCARDIOGRAM TRACING: CPT | Performed by: ANESTHESIOLOGY

## 2025-07-01 PROCEDURE — 93010 ELECTROCARDIOGRAM REPORT: CPT | Performed by: INTERNAL MEDICINE

## 2025-07-01 PROCEDURE — 80048 BASIC METABOLIC PNL TOTAL CA: CPT

## 2025-07-01 PROCEDURE — 85027 COMPLETE CBC AUTOMATED: CPT

## 2025-07-01 RX ORDER — SULFAMETHOXAZOLE AND TRIMETHOPRIM 800; 160 MG/1; MG/1
1 TABLET ORAL 2 TIMES DAILY
Qty: 14 TABLET | Refills: 0 | Status: SHIPPED | OUTPATIENT
Start: 2025-07-01 | End: 2025-07-08

## 2025-07-01 RX ORDER — OXYCODONE AND ACETAMINOPHEN 5; 325 MG/1; MG/1
1 TABLET ORAL EVERY 6 HOURS PRN
Qty: 20 TABLET | Refills: 0 | Status: SHIPPED | OUTPATIENT
Start: 2025-07-01 | End: 2025-07-06

## 2025-07-01 ASSESSMENT — PAIN DESCRIPTION - ONSET: ONSET: PROGRESSIVE

## 2025-07-01 ASSESSMENT — PAIN DESCRIPTION - DESCRIPTORS: DESCRIPTORS: ACHING;SHARP

## 2025-07-01 ASSESSMENT — PAIN DESCRIPTION - PAIN TYPE: TYPE: CHRONIC PAIN

## 2025-07-01 ASSESSMENT — PAIN DESCRIPTION - ORIENTATION: ORIENTATION: RIGHT

## 2025-07-01 ASSESSMENT — PAIN SCALES - GENERAL: PAINLEVEL_OUTOF10: 7

## 2025-07-01 ASSESSMENT — PAIN DESCRIPTION - FREQUENCY: FREQUENCY: CONTINUOUS

## 2025-07-01 ASSESSMENT — PAIN DESCRIPTION - LOCATION: LOCATION: FLANK

## 2025-07-01 NOTE — PERIOP NOTE
PLEASE CONTINUE TAKING ALL PRESCRIPTION MEDICATIONS UP TO THE DAY OF SURGERY UNLESS OTHERWISE DIRECTED BELOW. You may take Tylenol, allergy,  and/or indigestion medications.     TAKE ONLY THESE MEDICATIONS ON THE DAY OF SURGERY    Metformin (Glucophage)   Atorvastatin (Lipitor)    Metoprolol succinate (Toprol XL)   Sacubitril-valsartan (Entresto)   Finasteride (Proscar)   Tamsulosin (Flomax)      DISCONTINUE all vitamins and supplements 7 days prior to surgery. DISCONTINUE Non-Steroidal Anti-Inflammatory (NSAIDS) such as Advil and Aleve 5 days prior to surgery.     Home Medications to Hold- please continue all other medications except these.    Furosemide (Lasix) don't take morning of surgery  Potassium Chloride (KLOR-CON) don't take morning of surgery         Apixaban (Eliquis) follow Surgeon's Instructions. Hold prior to surgery as instructed .     Comments      On the day before surgery please take 2 Tylenol in the morning and then again before bed. You may use either regular or extra strength.           Please do not bring home medications with you on the day of surgery unless otherwise directed by your nurse.  If you are instructed to bring home medications, please give them to your nurse as they will be administered by the nursing staff.    If you have any questions, please call [facility] [phone].    A copy of this note was provided to the patient for reference.

## 2025-07-01 NOTE — PROGRESS NOTES
[facility]  [address] [address]    New Patient Office Visit    Edison Wright   1960   07/01/25   Referring Provider: Arron Clayton MD     Subjective:     Chief Complaint   Patient presents with    Other     Consult - Right kidney mass biopsy/possible ablation      History of Present Illness:    Pleasant 65-year-old male with history of BPH previously treated with TURP 2023 who is currently on Flomax and finasteride daily.  Patient has recurrent urinary tract infections thought to be secondary to a 2 cm bladder stone.  He has also had episodes of urinary retention due to worsening BPH symptoms.  He is scheduled July 8 to undergo cystoscopy, litholapaxy and repeat TURP.  Patient had a previously been anticoagulated on Eliquis for cardiac reasons but indicates that he has not been taking it due to hematuria.    During a workup for hematuria he had a CT abdomen and pelvis performed 6/05/25 and was incidentally found to have a 2.3 x 2.1 cm enhancing exophytic right renal mass as well as an approximately 8 mm posterior right renal mass that is also enhancing and concerning for malignancy.  Patient was referred to our department for consideration of potential biopsy and renal cryoablation.  Patient has renal insufficiency and most recent creatinine was 1.66 on 1/31/25.    Objective:     Allergies:  No Known Allergies     Medical History:    Past Medical History:   Diagnosis Date    Arthritis     Atrial fibrillation (HCC)     patient was taking eliquis. per patient he has not taken since \"last week of september\"    Chronic back pain     Colon cancer (HCC) 1990    Partial Colectomy and Chemo.    Depression 05/14/2015    managed well with meds    Diabetes (HCC)     oral agent only/pt doesnt monitor BS at home/no s.s of hypoglycemia/    Former cigarette smoker     History of GI bleed     HTN (hypertension) 04/28/2014    controlled well with meds

## 2025-07-01 NOTE — TELEPHONE ENCOUNTER
Patient called states that he needs pain meds called in also. States that he would like a call back if called in.

## 2025-07-01 NOTE — TELEPHONE ENCOUNTER
Patient called requesting pain medication be sent in.      Sent percocet 5/325 mg.  #20 to patient's pharmacy.     Arron Clayton M.D.    [facility]  [place] [id number]  Phone: [phone]  Fax: [phone]

## 2025-07-01 NOTE — TELEPHONE ENCOUNTER
Rufina,     Please call patient to notify him that pre-op urine had bacteria in it.  I am sending an antibiotic bactrim to his pharmacy to treat the bacteria prior to his upcoming surgery.     He can start it today / tomorrow.    Thanks!  Forrest

## 2025-07-01 NOTE — PERIOP NOTE
Labs  within anesthesia guidelines, no follow-up required. Labs automatically routed to ordering provider via Epic documentation. UA results 2+ bacterial sent to Dr. Clayton and Josefa Thao via telephone encounter.

## 2025-07-01 NOTE — PATIENT INSTRUCTIONS
You have 2 worrisome lesions in your right kidney concerning for cancer.    Please hold your Eliquis 48 hours prior to your cryoablation procedure date.  It is done with anesthesia and you will need a ride.  Do not eat after midnight before your procedure date.

## 2025-07-01 NOTE — PERIOP NOTE
Patient verified name and     Order for consent was found in EHR and doesmatch case posting; patient verified.     Type II surgery, walk-in assessment complete.    Labs per surgeon: UA,UCX, CBC, BMP, PAT Protocol,  ; results Pending  Labs per anesthesia protocol: T&S DOS, POC Glucose DOS, K+ Creatinine, (Included in Surgeon's Orders)   EK25, Procedure Pass sent down for Anesthesia to review        Patient provided with and instructed on educational handouts including Guide to Surgery, Preventing Surgical Site Infections, Pain Management, and [facility] Brochure.    Patient answered medical/surgical history questions at their best of ability. All prior to admission medications documented in EPIC. Original medication prescription bottle was not visualized during patient appointment.     Patient instructed to hold all vitamins 7 days prior to surgery and NSAIDS 5 days prior to surgery, patient verbalized understanding.     Patient teach back successful and patient demonstrates knowledge of instructions.

## 2025-07-03 ENCOUNTER — RESULTS FOLLOW-UP (OUTPATIENT)
Dept: UROLOGY | Age: 65
End: 2025-07-03

## 2025-07-08 ENCOUNTER — HOSPITAL ENCOUNTER (OUTPATIENT)
Age: 65
Setting detail: OBSERVATION
Discharge: HOME | End: 2025-07-09
Attending: UROLOGY | Admitting: UROLOGY
Payer: MEDICARE

## 2025-07-08 ENCOUNTER — ANESTHESIA (OUTPATIENT)
Dept: SURGERY | Age: 65
End: 2025-07-08
Payer: MEDICARE

## 2025-07-08 DIAGNOSIS — N40.1 ENLARGED PROSTATE WITH LOWER URINARY TRACT SYMPTOMS (LUTS): ICD-10-CM

## 2025-07-08 DIAGNOSIS — N13.8 BENIGN PROSTATIC HYPERPLASIA WITH URINARY OBSTRUCTION AND OTHER LOWER URINARY TRACT SYMPTOMS: Primary | ICD-10-CM

## 2025-07-08 DIAGNOSIS — N21.0 BLADDER STONE: ICD-10-CM

## 2025-07-08 DIAGNOSIS — N40.1 BENIGN PROSTATIC HYPERPLASIA WITH URINARY OBSTRUCTION AND OTHER LOWER URINARY TRACT SYMPTOMS: Primary | ICD-10-CM

## 2025-07-08 LAB
ABO + RH BLD: NORMAL
BLOOD GROUP ANTIBODIES SERPL: NORMAL
CREAT BLD-MCNC: 1.13 MG/DL (ref 0.8–1.5)
EST. AVERAGE GLUCOSE BLD GHB EST-MCNC: 136 MG/DL
GLUCOSE BLD STRIP.AUTO-MCNC: 115 MG/DL (ref 65–100)
GLUCOSE BLD STRIP.AUTO-MCNC: 123 MG/DL (ref 65–100)
GLUCOSE BLD STRIP.AUTO-MCNC: 128 MG/DL (ref 65–100)
GLUCOSE BLD STRIP.AUTO-MCNC: 204 MG/DL (ref 65–100)
HBA1C MFR BLD: 6.4 % (ref 0–5.6)
POTASSIUM BLD-SCNC: 3.7 MMOL/L (ref 3.5–5.1)
SERVICE CMNT-IMP: ABNORMAL
SPECIMEN EXP DATE BLD: NORMAL

## 2025-07-08 PROCEDURE — 2500000003 HC RX 250 WO HCPCS: Performed by: NURSE ANESTHETIST, CERTIFIED REGISTERED

## 2025-07-08 PROCEDURE — G0378 HOSPITAL OBSERVATION PER HR: HCPCS

## 2025-07-08 PROCEDURE — 7100000000 HC PACU RECOVERY - FIRST 15 MIN: Performed by: UROLOGY

## 2025-07-08 PROCEDURE — 6360000002 HC RX W HCPCS: Performed by: UROLOGY

## 2025-07-08 PROCEDURE — 52318 REMOVE BLADDER STONE: CPT | Performed by: UROLOGY

## 2025-07-08 PROCEDURE — 7100000001 HC PACU RECOVERY - ADDTL 15 MIN: Performed by: UROLOGY

## 2025-07-08 PROCEDURE — 2580000003 HC RX 258: Performed by: ANESTHESIOLOGY

## 2025-07-08 PROCEDURE — 3600000014 HC SURGERY LEVEL 4 ADDTL 15MIN: Performed by: UROLOGY

## 2025-07-08 PROCEDURE — 94760 N-INVAS EAR/PLS OXIMETRY 1: CPT

## 2025-07-08 PROCEDURE — 86850 RBC ANTIBODY SCREEN: CPT

## 2025-07-08 PROCEDURE — 2500000003 HC RX 250 WO HCPCS: Performed by: UROLOGY

## 2025-07-08 PROCEDURE — 83036 HEMOGLOBIN GLYCOSYLATED A1C: CPT

## 2025-07-08 PROCEDURE — 2580000003 HC RX 258: Performed by: NURSE ANESTHETIST, CERTIFIED REGISTERED

## 2025-07-08 PROCEDURE — 6360000002 HC RX W HCPCS: Performed by: NURSE ANESTHETIST, CERTIFIED REGISTERED

## 2025-07-08 PROCEDURE — 2709999900 HC NON-CHARGEABLE SUPPLY: Performed by: UROLOGY

## 2025-07-08 PROCEDURE — 52601 PROSTATECTOMY (TURP): CPT | Performed by: UROLOGY

## 2025-07-08 PROCEDURE — 82565 ASSAY OF CREATININE: CPT

## 2025-07-08 PROCEDURE — 82962 GLUCOSE BLOOD TEST: CPT

## 2025-07-08 PROCEDURE — 88305 TISSUE EXAM BY PATHOLOGIST: CPT

## 2025-07-08 PROCEDURE — 6370000000 HC RX 637 (ALT 250 FOR IP): Performed by: ANESTHESIOLOGY

## 2025-07-08 PROCEDURE — 86901 BLOOD TYPING SEROLOGIC RH(D): CPT

## 2025-07-08 PROCEDURE — 3700000000 HC ANESTHESIA ATTENDED CARE: Performed by: UROLOGY

## 2025-07-08 PROCEDURE — 3600000004 HC SURGERY LEVEL 4 BASE: Performed by: UROLOGY

## 2025-07-08 PROCEDURE — 6360000002 HC RX W HCPCS: Performed by: REGISTERED NURSE

## 2025-07-08 PROCEDURE — 3700000001 HC ADD 15 MINUTES (ANESTHESIA): Performed by: UROLOGY

## 2025-07-08 PROCEDURE — 6360000002 HC RX W HCPCS: Performed by: ANESTHESIOLOGY

## 2025-07-08 PROCEDURE — 2700000000 HC OXYGEN THERAPY PER DAY

## 2025-07-08 PROCEDURE — 84132 ASSAY OF SERUM POTASSIUM: CPT

## 2025-07-08 PROCEDURE — 6370000000 HC RX 637 (ALT 250 FOR IP): Performed by: UROLOGY

## 2025-07-08 PROCEDURE — 86900 BLOOD TYPING SEROLOGIC ABO: CPT

## 2025-07-08 PROCEDURE — 2720000010 HC SURG SUPPLY STERILE: Performed by: UROLOGY

## 2025-07-08 PROCEDURE — C1769 GUIDE WIRE: HCPCS | Performed by: UROLOGY

## 2025-07-08 PROCEDURE — 36415 COLL VENOUS BLD VENIPUNCTURE: CPT

## 2025-07-08 RX ORDER — POTASSIUM CHLORIDE 1500 MG/1
20 TABLET, EXTENDED RELEASE ORAL DAILY
Status: DISCONTINUED | OUTPATIENT
Start: 2025-07-09 | End: 2025-07-09 | Stop reason: HOSPADM

## 2025-07-08 RX ORDER — SODIUM CHLORIDE 0.9 % (FLUSH) 0.9 %
5-40 SYRINGE (ML) INJECTION EVERY 12 HOURS SCHEDULED
Status: DISCONTINUED | OUTPATIENT
Start: 2025-07-08 | End: 2025-07-08 | Stop reason: HOSPADM

## 2025-07-08 RX ORDER — OXYCODONE HYDROCHLORIDE 5 MG/1
5 TABLET ORAL
Status: COMPLETED | OUTPATIENT
Start: 2025-07-08 | End: 2025-07-08

## 2025-07-08 RX ORDER — LIDOCAINE HYDROCHLORIDE 10 MG/ML
1 INJECTION, SOLUTION INFILTRATION; PERINEURAL
Status: DISCONTINUED | OUTPATIENT
Start: 2025-07-08 | End: 2025-07-08 | Stop reason: HOSPADM

## 2025-07-08 RX ORDER — MORPHINE SULFATE 4 MG/ML
2 INJECTION, SOLUTION INTRAMUSCULAR; INTRAVENOUS
Status: DISCONTINUED | OUTPATIENT
Start: 2025-07-08 | End: 2025-07-09 | Stop reason: HOSPADM

## 2025-07-08 RX ORDER — FENTANYL CITRATE 50 UG/ML
INJECTION, SOLUTION INTRAMUSCULAR; INTRAVENOUS
Status: DISCONTINUED | OUTPATIENT
Start: 2025-07-08 | End: 2025-07-08 | Stop reason: SDUPTHER

## 2025-07-08 RX ORDER — SODIUM CHLORIDE 0.9 % (FLUSH) 0.9 %
5-40 SYRINGE (ML) INJECTION EVERY 12 HOURS SCHEDULED
Status: DISCONTINUED | OUTPATIENT
Start: 2025-07-08 | End: 2025-07-09 | Stop reason: HOSPADM

## 2025-07-08 RX ORDER — MAGNESIUM SULFATE IN WATER 40 MG/ML
2000 INJECTION, SOLUTION INTRAVENOUS PRN
Status: DISCONTINUED | OUTPATIENT
Start: 2025-07-08 | End: 2025-07-09 | Stop reason: HOSPADM

## 2025-07-08 RX ORDER — FENTANYL CITRATE 50 UG/ML
100 INJECTION, SOLUTION INTRAMUSCULAR; INTRAVENOUS PRN
Status: DISCONTINUED | OUTPATIENT
Start: 2025-07-08 | End: 2025-07-08 | Stop reason: HOSPADM

## 2025-07-08 RX ORDER — FINASTERIDE 5 MG/1
5 TABLET, FILM COATED ORAL DAILY
Status: DISCONTINUED | OUTPATIENT
Start: 2025-07-09 | End: 2025-07-09 | Stop reason: HOSPADM

## 2025-07-08 RX ORDER — ONDANSETRON 2 MG/ML
4 INJECTION INTRAMUSCULAR; INTRAVENOUS
Status: DISCONTINUED | OUTPATIENT
Start: 2025-07-08 | End: 2025-07-08 | Stop reason: HOSPADM

## 2025-07-08 RX ORDER — FENTANYL CITRATE 50 UG/ML
50 INJECTION, SOLUTION INTRAMUSCULAR; INTRAVENOUS PRN
Status: DISCONTINUED | OUTPATIENT
Start: 2025-07-08 | End: 2025-07-08 | Stop reason: HOSPADM

## 2025-07-08 RX ORDER — NEOSTIGMINE METHYLSULFATE 1 MG/ML
INJECTION, SOLUTION INTRAVENOUS
Status: DISCONTINUED | OUTPATIENT
Start: 2025-07-08 | End: 2025-07-08 | Stop reason: SDUPTHER

## 2025-07-08 RX ORDER — TAMSULOSIN HYDROCHLORIDE 0.4 MG/1
0.4 CAPSULE ORAL DAILY
Status: DISCONTINUED | OUTPATIENT
Start: 2025-07-09 | End: 2025-07-09 | Stop reason: HOSPADM

## 2025-07-08 RX ORDER — OXYCODONE AND ACETAMINOPHEN 5; 325 MG/1; MG/1
1 TABLET ORAL EVERY 4 HOURS PRN
Refills: 0 | Status: DISCONTINUED | OUTPATIENT
Start: 2025-07-08 | End: 2025-07-09 | Stop reason: HOSPADM

## 2025-07-08 RX ORDER — IBUPROFEN 600 MG/1
1 TABLET ORAL PRN
Status: DISCONTINUED | OUTPATIENT
Start: 2025-07-08 | End: 2025-07-09 | Stop reason: HOSPADM

## 2025-07-08 RX ORDER — FUROSEMIDE 40 MG/1
40 TABLET ORAL DAILY
Status: DISCONTINUED | OUTPATIENT
Start: 2025-07-09 | End: 2025-07-09 | Stop reason: HOSPADM

## 2025-07-08 RX ORDER — SODIUM CHLORIDE 9 MG/ML
INJECTION, SOLUTION INTRAVENOUS PRN
Status: DISCONTINUED | OUTPATIENT
Start: 2025-07-08 | End: 2025-07-08 | Stop reason: HOSPADM

## 2025-07-08 RX ORDER — SENNA AND DOCUSATE SODIUM 50; 8.6 MG/1; MG/1
1 TABLET, FILM COATED ORAL 2 TIMES DAILY
Status: DISCONTINUED | OUTPATIENT
Start: 2025-07-08 | End: 2025-07-09 | Stop reason: HOSPADM

## 2025-07-08 RX ORDER — DEXTROSE MONOHYDRATE 100 MG/ML
INJECTION, SOLUTION INTRAVENOUS CONTINUOUS PRN
Status: DISCONTINUED | OUTPATIENT
Start: 2025-07-08 | End: 2025-07-09 | Stop reason: HOSPADM

## 2025-07-08 RX ORDER — ATORVASTATIN CALCIUM 10 MG/1
20 TABLET, FILM COATED ORAL DAILY
Status: DISCONTINUED | OUTPATIENT
Start: 2025-07-09 | End: 2025-07-09 | Stop reason: HOSPADM

## 2025-07-08 RX ORDER — METOPROLOL SUCCINATE 50 MG/1
100 TABLET, EXTENDED RELEASE ORAL DAILY
Status: DISCONTINUED | OUTPATIENT
Start: 2025-07-09 | End: 2025-07-09 | Stop reason: HOSPADM

## 2025-07-08 RX ORDER — ONDANSETRON 4 MG/1
4 TABLET, ORALLY DISINTEGRATING ORAL EVERY 8 HOURS PRN
Status: DISCONTINUED | OUTPATIENT
Start: 2025-07-08 | End: 2025-07-09 | Stop reason: HOSPADM

## 2025-07-08 RX ORDER — SODIUM CHLORIDE 0.9 % (FLUSH) 0.9 %
5-40 SYRINGE (ML) INJECTION PRN
Status: DISCONTINUED | OUTPATIENT
Start: 2025-07-08 | End: 2025-07-09 | Stop reason: HOSPADM

## 2025-07-08 RX ORDER — POTASSIUM CHLORIDE 1500 MG/1
40 TABLET, EXTENDED RELEASE ORAL PRN
Status: DISCONTINUED | OUTPATIENT
Start: 2025-07-08 | End: 2025-07-09 | Stop reason: HOSPADM

## 2025-07-08 RX ORDER — ONDANSETRON 2 MG/ML
4 INJECTION INTRAMUSCULAR; INTRAVENOUS EVERY 6 HOURS PRN
Status: DISCONTINUED | OUTPATIENT
Start: 2025-07-08 | End: 2025-07-09 | Stop reason: HOSPADM

## 2025-07-08 RX ORDER — SODIUM CHLORIDE, SODIUM LACTATE, POTASSIUM CHLORIDE, CALCIUM CHLORIDE 600; 310; 30; 20 MG/100ML; MG/100ML; MG/100ML; MG/100ML
INJECTION, SOLUTION INTRAVENOUS CONTINUOUS
Status: DISCONTINUED | OUTPATIENT
Start: 2025-07-08 | End: 2025-07-08 | Stop reason: HOSPADM

## 2025-07-08 RX ORDER — HYOSCYAMINE SULFATE 0.12 MG/1
0.12 TABLET SUBLINGUAL EVERY 4 HOURS PRN
Status: DISCONTINUED | OUTPATIENT
Start: 2025-07-08 | End: 2025-07-09 | Stop reason: HOSPADM

## 2025-07-08 RX ORDER — MIDAZOLAM HYDROCHLORIDE 2 MG/2ML
2 INJECTION, SOLUTION INTRAMUSCULAR; INTRAVENOUS
Status: DISCONTINUED | OUTPATIENT
Start: 2025-07-08 | End: 2025-07-08 | Stop reason: HOSPADM

## 2025-07-08 RX ORDER — ROCURONIUM BROMIDE 10 MG/ML
INJECTION, SOLUTION INTRAVENOUS
Status: DISCONTINUED | OUTPATIENT
Start: 2025-07-08 | End: 2025-07-08 | Stop reason: SDUPTHER

## 2025-07-08 RX ORDER — INSULIN LISPRO 100 [IU]/ML
0-4 INJECTION, SOLUTION INTRAVENOUS; SUBCUTANEOUS
Status: DISCONTINUED | OUTPATIENT
Start: 2025-07-08 | End: 2025-07-09 | Stop reason: HOSPADM

## 2025-07-08 RX ORDER — LIDOCAINE HYDROCHLORIDE 20 MG/ML
INJECTION, SOLUTION EPIDURAL; INFILTRATION; INTRACAUDAL; PERINEURAL
Status: DISCONTINUED | OUTPATIENT
Start: 2025-07-08 | End: 2025-07-08 | Stop reason: SDUPTHER

## 2025-07-08 RX ORDER — GLYCOPYRROLATE 0.2 MG/ML
INJECTION INTRAMUSCULAR; INTRAVENOUS
Status: DISCONTINUED | OUTPATIENT
Start: 2025-07-08 | End: 2025-07-08 | Stop reason: SDUPTHER

## 2025-07-08 RX ORDER — SODIUM CHLORIDE, SODIUM LACTATE, POTASSIUM CHLORIDE, CALCIUM CHLORIDE 600; 310; 30; 20 MG/100ML; MG/100ML; MG/100ML; MG/100ML
INJECTION, SOLUTION INTRAVENOUS
Status: DISCONTINUED | OUTPATIENT
Start: 2025-07-08 | End: 2025-07-08 | Stop reason: SDUPTHER

## 2025-07-08 RX ORDER — SODIUM CHLORIDE 0.9 % (FLUSH) 0.9 %
5-40 SYRINGE (ML) INJECTION PRN
Status: DISCONTINUED | OUTPATIENT
Start: 2025-07-08 | End: 2025-07-08 | Stop reason: HOSPADM

## 2025-07-08 RX ORDER — PROPOFOL 10 MG/ML
INJECTION, EMULSION INTRAVENOUS
Status: DISCONTINUED | OUTPATIENT
Start: 2025-07-08 | End: 2025-07-08 | Stop reason: SDUPTHER

## 2025-07-08 RX ORDER — ONDANSETRON 2 MG/ML
INJECTION INTRAMUSCULAR; INTRAVENOUS
Status: DISCONTINUED | OUTPATIENT
Start: 2025-07-08 | End: 2025-07-08 | Stop reason: SDUPTHER

## 2025-07-08 RX ORDER — DEXAMETHASONE SODIUM PHOSPHATE 4 MG/ML
INJECTION, SOLUTION INTRA-ARTICULAR; INTRALESIONAL; INTRAMUSCULAR; INTRAVENOUS; SOFT TISSUE
Status: DISCONTINUED | OUTPATIENT
Start: 2025-07-08 | End: 2025-07-08 | Stop reason: SDUPTHER

## 2025-07-08 RX ORDER — SACUBITRIL AND VALSARTAN 97; 103 MG/1; MG/1
1 TABLET, FILM COATED ORAL 2 TIMES DAILY
Status: DISCONTINUED | OUTPATIENT
Start: 2025-07-08 | End: 2025-07-09 | Stop reason: HOSPADM

## 2025-07-08 RX ORDER — SODIUM CHLORIDE 9 MG/ML
INJECTION, SOLUTION INTRAVENOUS PRN
Status: DISCONTINUED | OUTPATIENT
Start: 2025-07-08 | End: 2025-07-09 | Stop reason: HOSPADM

## 2025-07-08 RX ORDER — POTASSIUM CHLORIDE 7.45 MG/ML
10 INJECTION INTRAVENOUS PRN
Status: DISCONTINUED | OUTPATIENT
Start: 2025-07-08 | End: 2025-07-09 | Stop reason: HOSPADM

## 2025-07-08 RX ORDER — OXYBUTYNIN CHLORIDE 5 MG/1
5 TABLET ORAL ONCE
Status: COMPLETED | OUTPATIENT
Start: 2025-07-08 | End: 2025-07-08

## 2025-07-08 RX ADMIN — ONDANSETRON 4 MG: 2 INJECTION, SOLUTION INTRAMUSCULAR; INTRAVENOUS at 13:41

## 2025-07-08 RX ADMIN — FENTANYL CITRATE 25 MCG: 50 INJECTION, SOLUTION INTRAMUSCULAR; INTRAVENOUS at 14:09

## 2025-07-08 RX ADMIN — Medication 3 MG: at 14:39

## 2025-07-08 RX ADMIN — FENTANYL CITRATE 25 MCG: 50 INJECTION, SOLUTION INTRAMUSCULAR; INTRAVENOUS at 14:11

## 2025-07-08 RX ADMIN — FENTANYL CITRATE 50 MCG: 50 INJECTION, SOLUTION INTRAMUSCULAR; INTRAVENOUS at 13:25

## 2025-07-08 RX ADMIN — CEFAZOLIN 2000 MG: 10 INJECTION, POWDER, FOR SOLUTION INTRAVENOUS at 20:30

## 2025-07-08 RX ADMIN — ROCURONIUM BROMIDE 40 MG: 10 INJECTION, SOLUTION INTRAVENOUS at 13:25

## 2025-07-08 RX ADMIN — OXYCODONE 5 MG: 5 TABLET ORAL at 15:32

## 2025-07-08 RX ADMIN — SACUBITRIL AND VALSARTAN 1 TABLET: 97; 103 TABLET, FILM COATED ORAL at 21:30

## 2025-07-08 RX ADMIN — FENTANYL CITRATE 25 MCG: 50 INJECTION, SOLUTION INTRAMUSCULAR; INTRAVENOUS at 14:15

## 2025-07-08 RX ADMIN — HYDROMORPHONE HYDROCHLORIDE 0.5 MG: 1 INJECTION, SOLUTION INTRAMUSCULAR; INTRAVENOUS; SUBCUTANEOUS at 15:22

## 2025-07-08 RX ADMIN — SODIUM CHLORIDE, SODIUM LACTATE, POTASSIUM CHLORIDE, AND CALCIUM CHLORIDE: .6; .31; .03; .02 INJECTION, SOLUTION INTRAVENOUS at 11:48

## 2025-07-08 RX ADMIN — INSULIN LISPRO 1 UNITS: 100 INJECTION, SOLUTION INTRAVENOUS; SUBCUTANEOUS at 21:30

## 2025-07-08 RX ADMIN — SODIUM CHLORIDE, SODIUM LACTATE, POTASSIUM CHLORIDE, AND CALCIUM CHLORIDE: 600; 310; 30; 20 INJECTION, SOLUTION INTRAVENOUS at 13:21

## 2025-07-08 RX ADMIN — GLYCOPYRROLATE 0.2 MG: 0.2 INJECTION INTRAMUSCULAR; INTRAVENOUS at 13:59

## 2025-07-08 RX ADMIN — DEXAMETHASONE SODIUM PHOSPHATE 8 MG: 4 INJECTION, SOLUTION INTRAMUSCULAR; INTRAVENOUS at 13:41

## 2025-07-08 RX ADMIN — DOCUSATE SODIUM 50 MG AND SENNOSIDES 8.6 MG 1 TABLET: 8.6; 5 TABLET, FILM COATED ORAL at 21:30

## 2025-07-08 RX ADMIN — MORPHINE SULFATE 2 MG: 4 INJECTION INTRAVENOUS at 22:30

## 2025-07-08 RX ADMIN — FENTANYL CITRATE 25 MCG: 50 INJECTION, SOLUTION INTRAMUSCULAR; INTRAVENOUS at 14:21

## 2025-07-08 RX ADMIN — GLYCOPYRROLATE 0.4 MG: 0.2 INJECTION INTRAMUSCULAR; INTRAVENOUS at 14:39

## 2025-07-08 RX ADMIN — PROPOFOL 200 MG: 10 INJECTION, EMULSION INTRAVENOUS at 13:25

## 2025-07-08 RX ADMIN — SODIUM CHLORIDE, PRESERVATIVE FREE 10 ML: 5 INJECTION INTRAVENOUS at 21:31

## 2025-07-08 RX ADMIN — OXYBUTYNIN CHLORIDE 5 MG: 5 TABLET ORAL at 15:12

## 2025-07-08 RX ADMIN — LIDOCAINE HYDROCHLORIDE 100 MG: 20 INJECTION, SOLUTION EPIDURAL; INFILTRATION; INTRACAUDAL; PERINEURAL at 13:25

## 2025-07-08 RX ADMIN — HYDROMORPHONE HYDROCHLORIDE 0.5 MG: 1 INJECTION, SOLUTION INTRAMUSCULAR; INTRAVENOUS; SUBCUTANEOUS at 15:13

## 2025-07-08 RX ADMIN — HYDROMORPHONE HYDROCHLORIDE 0.5 MG: 1 INJECTION, SOLUTION INTRAMUSCULAR; INTRAVENOUS; SUBCUTANEOUS at 14:57

## 2025-07-08 ASSESSMENT — PAIN DESCRIPTION - DESCRIPTORS
DESCRIPTORS: BURNING
DESCRIPTORS: DISCOMFORT
DESCRIPTORS: BURNING
DESCRIPTORS: DISCOMFORT

## 2025-07-08 ASSESSMENT — PAIN SCALES - GENERAL
PAINLEVEL_OUTOF10: 0
PAINLEVEL_OUTOF10: 2
PAINLEVEL_OUTOF10: 7
PAINLEVEL_OUTOF10: 0
PAINLEVEL_OUTOF10: 6
PAINLEVEL_OUTOF10: 8
PAINLEVEL_OUTOF10: 9
PAINLEVEL_OUTOF10: 8

## 2025-07-08 ASSESSMENT — PAIN DESCRIPTION - LOCATION
LOCATION: PERINEUM
LOCATION: ABDOMEN;GROIN
LOCATION: ABDOMEN;GROIN
LOCATION: PERINEUM
LOCATION: PERINEUM

## 2025-07-08 ASSESSMENT — PAIN DESCRIPTION - ORIENTATION
ORIENTATION: ANTERIOR
ORIENTATION: ANTERIOR

## 2025-07-08 ASSESSMENT — PAIN - FUNCTIONAL ASSESSMENT: PAIN_FUNCTIONAL_ASSESSMENT: 0-10

## 2025-07-08 NOTE — PROGRESS NOTES
TRANSFER - IN REPORT:    Verbal report received from JUDE Warren on Edison Wright  being received from  P[facility]U for routine progression of patient care      Report consisted of patient's Situation, Background, Assessment and   Recommendations(SBAR).     Information from the following report(s) Nurse Handoff Report was reviewed with the receiving nurse.    Opportunity for questions and clarification was provided.      Assessment completed upon patient's arrival to unit and care assumed.

## 2025-07-08 NOTE — ANESTHESIA POSTPROCEDURE EVALUATION
Department of Anesthesiology  Postprocedure Note    Patient: Edison Wright  MRN: [id number]  YOB: 1960  Date of evaluation: 7/8/2025    Procedure Summary       Date: 07/08/25 Room / Location: [facility] MAIN OR 01 CYSTO / [facility] MAIN OR    Anesthesia Start: 1316 Anesthesia Stop: 1448    Procedures:       CYSTOSCOPY LITHOLAPAXY (Bladder)      CYSTOSCOPY TRANSURETHRAL RESECTION PROSTATE BIPOLAR (Bladder) Diagnosis:       Bladder stone      Enlarged prostate with lower urinary tract symptoms (LUTS)      (Bladder stone [N21.0])      (Enlarged prostate with lower urinary tract symptoms (LUTS) [N40.1])    Providers: Arron Clayton MD Responsible Provider: True Shepard Jr., MD    Anesthesia Type: General ASA Status: 3            Anesthesia Type: General    Jhony Phase I: Jhony Score: 10    Jhony Phase II:      Anesthesia Post Evaluation    Patient location during evaluation: PACU  Patient participation: complete - patient participated  Level of consciousness: awake  Pain score: 0  Airway patency: patent  Nausea & Vomiting: no nausea and no vomiting  Cardiovascular status: blood pressure returned to baseline and hemodynamically stable  Respiratory status: acceptable, spontaneous ventilation and nonlabored ventilation  Hydration status: euvolemic  Multimodal analgesia pain management approach  Pain management: adequate    No notable events documented.

## 2025-07-08 NOTE — H&P
Update History & Physical    The patient's History and Physical of 2025 was reviewed with the patient and I examined the patient. There was no change. The surgical site was confirmed by the patient and me.       Plan: The risks, benefits, expected outcome, and alternative to the recommended procedure have been discussed with the patient. Patient understands and wants to proceed with the procedure.     Electronically signed by Arron Clayton MD on 2025 at 12:38 PM    [facility]  [address] [place] [address]  [phone]     Edison Wright  : 1960         Chief Complaint   Patient presents with    Follow-up            HPI      Edison Wright is a 65 y.o. male     History of Present Illness  The patient is a 65-year-old male with a history of urinary tract infections (UTIs) status post transurethral resection of the prostate (TURP) in , currently on Flomax and finasteride daily. He is scheduled for a cystoscopy, litholapaxy, and repeat TURP in 2025 due to a recent workup for UTIs that revealed a 2 cm bladder stone as a likely source.      During the recent workup in 2025, a CT abdomen and pelvis were performed due to hematuria, which incidentally revealed two masses on the right kidney, measuring 2 cm and 1 cm, concerning for renal cell carcinoma. These masses are classified as stage I and are confined to the kidney. He also has chronic kidney disease (CKD) with a GFR of 46 and baseline creatinine of 1.66. Additionally, he has a benign lipid-rich right adrenal adenoma without concern for metastatic disease.     He reports experiencing abdominal discomfort and pain. He has discontinued his Eliquis medication since his last surgery in , during which he was fitted with a catheter. He recalls an incident where he had to seek emergency care due to an inability to urinate, despite increased water intake, which resulted in bladder  to continue monitoring kidney function and follow up with his nephrologist as needed.     4. Benign lipid-rich right adrenal adenoma.  A benign lipid-rich right adrenal adenoma was noted without concern for metastatic disease. No immediate intervention is required for this finding.     Follow-up  The patient will follow up in July 2025 as scheduled for his BPH / bladder stone surgery.  IR will call him to schedule procedure.      Attestation        The patient (or guardian, if applicable) and other individuals in attendance with the patient were advised that Artificial Intelligence will be utilized during this visit to record, process the conversation to generate a clinical note, and support improvement of the AI technology. The patient (or guardian, if applicable) and other individuals in attendance at the appointment consented to the use of AI, including the recording.       I have spent 40 minutes today reviewing previous notes, test results and face to face with the patient as well as documenting.        Arron Clayton M.D.     [facility] [place] [address]  Phone: [phone]  Fax: [phone]

## 2025-07-08 NOTE — PERIOP NOTE
TRANSFER - OUT REPORT:    Verbal report given to Amena ROQUE on Edison Wright  being transferred to [facility] for routine progression of patient care       Report consisted of patient’s Situation, Background, Assessment and   Recommendations(SBAR).     Information from the following report(s) Adult Overview, Surgery Report, Intake/Output, MAR, Cardiac Rhythm afib, and Neuro Assessment was reviewed with the receiving nurse.    Lines:   Peripheral IV 07/08/25 Posterior;Right Forearm (Active)   Site Assessment Clean, dry & intact 07/08/25 1535   Line Status Infusing 07/08/25 1535   Line Care Connections checked and tightened 07/08/25 1535   Phlebitis Assessment No symptoms 07/08/25 1535   Infiltration Assessment 0 07/08/25 1535   Alcohol Cap Used No 07/08/25 1147   Dressing Status Clean, dry & intact 07/08/25 1535   Dressing Type Transparent 07/08/25 1535   Dressing Intervention New 07/08/25 1147        Opportunity for questions and clarification was provided.      Patient transported with:   Tech    VTE prophylaxis orders have not been written for Edison Wright.    Patient and family given floor number and nurses name.  Family updated re: pt status after security code verified.

## 2025-07-08 NOTE — FLOWSHEET NOTE
07/08/25 1628   Dual Clinician Skin Assessment   Dual Skin Assessment (4 Eyes) WDL   Second Clinical  (First and Last Name) JUDE Salazar   Skin Integumentary    Skin Color Red  (face)   Skin Condition/Temp Warm;Dry;Flaky   Skin Integrity Scars (comment)   Location scar to midline abdomen   Skin Integumentary (WDL) X       4 Eyes Skin Assessment     NAME:  Edison Wright  YOB: 1960  MEDICAL RECORD NUMBER:  [id number]    The patient is being assessed for  Admission    I agree that at least one RN has performed a thorough Head to Toe Skin Assessment on the patient. ALL assessment sites listed below have been assessed.      Areas assessed by both nurses:    Head, Face, Ears, Shoulders, Back, Chest, Arms, Elbows, Hands, Sacrum. Buttock, Coccyx, Ischium, Legs. Feet and Heels, and Under Medical Devices         Does the Patient have a Wound? No noted wound(s)       Lloyd Prevention initiated by RN: Yes  Wound Care Orders initiated by RN: No    Pressure Injury (Stage 1,2,3,4, Unstageable, DTI, NWPT, and Complex wounds) if present, place Wound referral order by RN under : No    New Ostomies, if present place, Ostomy referral order under : No     Nurse 1 eSignature: Electronically signed by Amena Murray RN on 7/8/25 at 5:36 PM EDT    **SHARE this note so that the co-signing nurse can place an eSignature**    Nurse 2 eSignature: {Esignature:[id number]}

## 2025-07-08 NOTE — OP NOTE
[address]  [address]                            OPERATIVE REPORT      PATIENT NAME: GENARO LEE          : 1960  MED REC NO: [id number]                       ROOM: [facility] [place] NO: [id number]                       ADMIT DATE: 2025  PROVIDER: Arron Clayton MD    DATE OF SERVICE:  2025    PREOPERATIVE DIAGNOSES:  Bladder stone greater than 2 cm, BPH with lower urinary tract symptoms.    POSTOPERATIVE DIAGNOSES:  Bladder stone greater than 2 cm, BPH with lower urinary tract symptoms.    PROCEDURES PERFORMED:  Cystolitholapaxy for bladder stone greater than 2.5 cm, cystoscopy, transurethral resection of prostate, bipolar.    SURGEON:  Arron Clayton MD    ASSISTANT:  Surgical assistant, none.    ANESTHESIA:  General.    ESTIMATED BLOOD LOSS:  Less than 100.    SPECIMENS REMOVED:  Prostate tissue for pathology.    INTRAOPERATIVE FINDINGS:  No infection.    Other findings:  2.5 cm bladder stone near the bladder outlet, fragmented, and removed.  Trilobar BPH with an open channel at the end of the case.     COMPLICATIONS:  None.    IMPLANTS:  None.    INDICATIONS:  The patient is a pleasant 65-year-old male with a history of an enlarged prostate, recurrent urinary tract infections, and bladder stones.  He has had a prior TURP in the past, but more recently he has had worsening difficulty with urination and recurrent urinary tract infections.  He underwent a workup for this, which showed a 2.5 cm bladder stone near the bladder outlet and significant regrowth of the lateral and median lobes of the prostate causing visual obstruction.  He was counseled on management options, and wanted to proceed with a cystolitholapaxy and a TURP in the OR today after risk-benefit discussion was had.    DESCRIPTION OF PROCEDURE:  After informed consent was obtained, the correct patient was identified in the preoperative holding area.   He was taken back to the operating room suite, placed on the table in the supine position.  Time-out was performed confirming correct patient and planned procedure.  He received 2 g IV Ancef prior to the smooth induction of general endotracheal anesthesia.  He was moved into the dorsal lithotomy position, prepped and draped in the usual sterile fashion.  I began the case by inserting a 26-Mongolian bipolar resectoscope using the laser bridge and visual obturator through the urethra and advanced it through the prostate into the bladder.  Once the scope was in position, I then performed pan cystoscopy, which showed trilobar BPH, regrowth causing visual obstruction at the bladder outlet, and a 2.5 cm stone in the bladder.  The ureteral orifices were visualized and well away from the prostate resection bed.  I then inserted my 500-micron holmium laser fiber, I used the setting of 1.0 joule and 15 hertz to laser the bladder stone into small pieces.  These were then evacuated out through the resectoscope sheath.  After the bladder stone was completely cleared from the bladder, I then removed the laser bridge and switched to the bipolar resectoscope loop.  I used the standard settings of 2 cut and 3 coag to systematically resect the regrowth areas and achieve an open channel as currently there was not a wide-open channel.  I began by taking down the median lobe regrowth down to the level of the bladder.  Care was taken not to undermine the bladder neck or injure the ureteral orifices.  I then moved back to the bladder neck and resected from 6 to 12 o'clock on patient's right moving from bladder neck proximally to verumontanum distally.  I then resected from 6 to 12 o'clock on the patient's left moving from bladder neck proximally to verumontanum distally.  This created a wide-open circumferential channel without obstruction and I could easily see from the verumontanum all the way into the bladder at the end of the case.

## 2025-07-08 NOTE — BRIEF OP NOTE
Brief Postoperative Note      Patient: Edison Wright  YOB: 1960  MRN: [id number]    Date of Procedure: 7/8/2025    Pre-Op Diagnosis Codes:      * Bladder stone [N21.0]     * Enlarged prostate with lower urinary tract symptoms (LUTS) [N40.1]    Post-Op Diagnosis: Same       Procedure(s):  CYSTOSCOPY LITHOLAPAXY  CYSTOSCOPY TRANSURETHRAL RESECTION PROSTATE BIPOLAR, stone > 2 cm     Surgeon(s):  Arron Clayton MD    Assistant:  * No surgical staff found *    Anesthesia: General    Estimated Blood Loss (mL): less than 100     Complications: None    Specimens:   ID Type Source Tests Collected by Time Destination   A : PROSTATE TISSUE Tissue Tissue SURGICAL PATHOLOGY Arron Clayton MD 7/8/2025 1406        Implants:  * No implants in log *      Drains:   Urinary Catheter 07/08/25 3 Way;Breen (Active)       Findings:  Infection Present At Time Of Surgery (PATOS) (choose all levels that have infection present):  No infection present  Other Findings: 2.5 cm bladder stone near bladder outlet fragmented / removed.  Trilobe BPH with open channel at end of case.     Electronically signed by Arron Clayton MD on 7/8/2025 at 2:51 PM

## 2025-07-08 NOTE — ANESTHESIA PRE PROCEDURE
CHF:      ECG reviewed  Rhythm: regular  Rate: normal  Echocardiogram reviewed         Beta Blocker:  Dose within 24 Hrs      ROS comment: Echo 2/2023 showing EF 59%, mild MR, mild TR     Neuro/Psych:   (+) depression/anxiety              ROS comment: Chronic LBP GI/Hepatic/Renal:   (+) renal disease: CRI          Endo/Other:    (+) DiabetesType II DM, : arthritis: OA., malignancy/cancer (H/o Colon Cancer s/p chemo and partial colectomy).                 Abdominal:              PE comment: Deferred   Vascular: negative vascular ROS.         Other Findings:        ECHOCARDIOGRAPHY (TTE)   TRANSTHORACIC ECHOCARDIOGRAM (TTE) COMPLETE (CONTRAST/BUBBLE/3D PRN) 02/08/2023    Interpretation Summary    Left Ventricle: Normal left ventricular systolic function. EF by 2D Simpsons Biplane is 59%. Left ventricle size is normal. Mildly increased wall thickness. Normal wall motion.    Mitral Valve: Mild regurgitation.    Tricuspid Valve: Mild regurgitation. The estimated RVSP is 21 mmHg.    Left Atrium: Left atrium is mildly dilated.    Right Atrium: Right atrium is mildly dilated.    Technical qualifiers: Color flow Doppler was performed and pulse wave and/or continuous wave Doppler was performed.      TRANSTHORACIC ECHOCARDIOGRAM (TTE) LIMITED (CONTRAST/BUBBLE/3D PRN) 02/22/2022    Narrative  This is a summary report. The complete report is available in the patient's medical record. If you cannot access the medical record, please contact the sending organization for a detailed fax or copy.      Left Ventricle: Mild global hypokinesis present. There is paradoxical septal motion consistent with intraventricular conduction delay. Mildly reduced left ventricular systolic function with a visually estimated EF of 45 - 50%.    Contrast used: Definity.        Anesthesia Plan      general     ASA 3       Induction: intravenous.    MIPS: Postoperative opioids intended and Prophylactic antiemetics administered.  Anesthetic plan and risks

## 2025-07-09 VITALS
WEIGHT: 224 LBS | RESPIRATION RATE: 16 BRPM | OXYGEN SATURATION: 97 % | HEART RATE: 52 BPM | DIASTOLIC BLOOD PRESSURE: 73 MMHG | SYSTOLIC BLOOD PRESSURE: 113 MMHG | BODY MASS INDEX: 27.28 KG/M2 | HEIGHT: 76 IN | TEMPERATURE: 97.7 F

## 2025-07-09 LAB
ANION GAP SERPL CALC-SCNC: 11 MMOL/L (ref 7–16)
BASOPHILS # BLD: 0.02 K/UL (ref 0–0.2)
BASOPHILS NFR BLD: 0.2 % (ref 0–2)
BUN SERPL-MCNC: 15 MG/DL (ref 8–23)
CALCIUM SERPL-MCNC: 9.1 MG/DL (ref 8.8–10.2)
CHLORIDE SERPL-SCNC: 102 MMOL/L (ref 98–107)
CO2 SERPL-SCNC: 22 MMOL/L (ref 20–29)
CREAT SERPL-MCNC: 1.26 MG/DL (ref 0.8–1.3)
DIFFERENTIAL METHOD BLD: ABNORMAL
EOSINOPHIL # BLD: 0 K/UL (ref 0–0.8)
EOSINOPHIL NFR BLD: 0 % (ref 0.5–7.8)
ERYTHROCYTE [DISTWIDTH] IN BLOOD BY AUTOMATED COUNT: 13.3 % (ref 11.9–14.6)
GLUCOSE BLD STRIP.AUTO-MCNC: 154 MG/DL (ref 65–100)
GLUCOSE BLD STRIP.AUTO-MCNC: 156 MG/DL (ref 65–100)
GLUCOSE SERPL-MCNC: 148 MG/DL (ref 70–99)
HCT VFR BLD AUTO: 44.4 % (ref 41.1–50.3)
HGB BLD-MCNC: 14.8 G/DL (ref 13.6–17.2)
IMM GRANULOCYTES # BLD AUTO: 0.08 K/UL (ref 0–0.5)
IMM GRANULOCYTES NFR BLD AUTO: 0.6 % (ref 0–5)
LYMPHOCYTES # BLD: 1.26 K/UL (ref 0.5–4.6)
LYMPHOCYTES NFR BLD: 9.9 % (ref 13–44)
MCH RBC QN AUTO: 29.7 PG (ref 26.1–32.9)
MCHC RBC AUTO-ENTMCNC: 33.3 G/DL (ref 31.4–35)
MCV RBC AUTO: 89 FL (ref 82–102)
MONOCYTES # BLD: 0.46 K/UL (ref 0.1–1.3)
MONOCYTES NFR BLD: 3.6 % (ref 4–12)
NEUTS SEG # BLD: 10.85 K/UL (ref 1.7–8.2)
NEUTS SEG NFR BLD: 85.7 % (ref 43–78)
NRBC # BLD: 0 K/UL (ref 0–0.2)
PLATELET # BLD AUTO: 194 K/UL (ref 150–450)
PMV BLD AUTO: 10 FL (ref 9.4–12.3)
POTASSIUM SERPL-SCNC: 4.3 MMOL/L (ref 3.5–5.1)
RBC # BLD AUTO: 4.99 M/UL (ref 4.23–5.6)
SERVICE CMNT-IMP: ABNORMAL
SERVICE CMNT-IMP: ABNORMAL
SODIUM SERPL-SCNC: 136 MMOL/L (ref 136–145)
WBC # BLD AUTO: 12.7 K/UL (ref 4.3–11.1)

## 2025-07-09 PROCEDURE — G0378 HOSPITAL OBSERVATION PER HR: HCPCS

## 2025-07-09 PROCEDURE — 82962 GLUCOSE BLOOD TEST: CPT

## 2025-07-09 PROCEDURE — 2500000003 HC RX 250 WO HCPCS: Performed by: UROLOGY

## 2025-07-09 PROCEDURE — 80048 BASIC METABOLIC PNL TOTAL CA: CPT

## 2025-07-09 PROCEDURE — 6360000002 HC RX W HCPCS: Performed by: UROLOGY

## 2025-07-09 PROCEDURE — 6370000000 HC RX 637 (ALT 250 FOR IP): Performed by: UROLOGY

## 2025-07-09 PROCEDURE — 36415 COLL VENOUS BLD VENIPUNCTURE: CPT

## 2025-07-09 PROCEDURE — 96374 THER/PROPH/DIAG INJ IV PUSH: CPT | Performed by: ANESTHESIOLOGY

## 2025-07-09 PROCEDURE — 85025 COMPLETE CBC W/AUTO DIFF WBC: CPT

## 2025-07-09 PROCEDURE — 51700 IRRIGATION OF BLADDER: CPT

## 2025-07-09 RX ORDER — OXYCODONE AND ACETAMINOPHEN 5; 325 MG/1; MG/1
1 TABLET ORAL EVERY 6 HOURS PRN
Qty: 20 TABLET | Refills: 0 | Status: SHIPPED | OUTPATIENT
Start: 2025-07-09 | End: 2025-07-14

## 2025-07-09 RX ADMIN — TAMSULOSIN HYDROCHLORIDE 0.4 MG: 0.4 CAPSULE ORAL at 07:47

## 2025-07-09 RX ADMIN — MORPHINE SULFATE 2 MG: 4 INJECTION INTRAVENOUS at 05:37

## 2025-07-09 RX ADMIN — CEFAZOLIN 2000 MG: 10 INJECTION, POWDER, FOR SOLUTION INTRAVENOUS at 12:38

## 2025-07-09 RX ADMIN — SODIUM CHLORIDE, PRESERVATIVE FREE 10 ML: 5 INJECTION INTRAVENOUS at 07:52

## 2025-07-09 RX ADMIN — METOPROLOL SUCCINATE 100 MG: 50 TABLET, EXTENDED RELEASE ORAL at 07:47

## 2025-07-09 RX ADMIN — SACUBITRIL AND VALSARTAN 1 TABLET: 97; 103 TABLET, FILM COATED ORAL at 07:47

## 2025-07-09 RX ADMIN — FINASTERIDE 5 MG: 5 TABLET, FILM COATED ORAL at 07:47

## 2025-07-09 RX ADMIN — POTASSIUM CHLORIDE 20 MEQ: 1500 TABLET, EXTENDED RELEASE ORAL at 07:47

## 2025-07-09 RX ADMIN — FUROSEMIDE 40 MG: 40 TABLET ORAL at 07:47

## 2025-07-09 RX ADMIN — CEFAZOLIN 2000 MG: 10 INJECTION, POWDER, FOR SOLUTION INTRAVENOUS at 03:38

## 2025-07-09 RX ADMIN — ATORVASTATIN CALCIUM 20 MG: 10 TABLET, FILM COATED ORAL at 07:47

## 2025-07-09 RX ADMIN — DOCUSATE SODIUM 50 MG AND SENNOSIDES 8.6 MG 1 TABLET: 8.6; 5 TABLET, FILM COATED ORAL at 07:47

## 2025-07-09 ASSESSMENT — PAIN SCALES - GENERAL
PAINLEVEL_OUTOF10: 0
PAINLEVEL_OUTOF10: 8

## 2025-07-09 ASSESSMENT — PAIN DESCRIPTION - ORIENTATION: ORIENTATION: ANTERIOR

## 2025-07-09 ASSESSMENT — PAIN DESCRIPTION - LOCATION: LOCATION: ABDOMEN

## 2025-07-09 ASSESSMENT — PAIN DESCRIPTION - DESCRIPTORS: DESCRIPTORS: ACHING;DISCOMFORT

## 2025-07-09 NOTE — PROGRESS NOTES
Admit Date: 7/8/2025    Subjective:     Patient has no new complaints.     Objective:     Patient Vitals for the past 8 hrs:   BP Temp Temp src Pulse Resp SpO2   07/09/25 0740 113/73 97.7 °F (36.5 °C) Oral 52 16 97 %   07/09/25 0607 -- -- -- -- 18 --   07/09/25 0537 -- -- -- -- 18 --   07/09/25 0530 115/83 98 °F (36.7 °C) Oral 80 18 96 %     07/09 0701 - 07/09 1900  In: 500 [P.O.:500]  Out: 2000 [Urine:2000]  07/07 1901 - 07/09 0700  In: 1400 [I.V.:1400]  Out: 750 [Urine:750]    Physical Exam:  GENERAL ASSESSMENT: alert, oriented to person, place and time, no acute distress and no anxiety, depression or agitation  Chest: Easy work of breathing  CVS exam: RRR  ABDOMEN: not done  Neurological exam reveals alert, oriented, normal speech, no focal findings or movement disorder noted.  FEMALE GENITOURINARY EXAM: not done  MALE GENITAL EXAM: CBI with mak draining light pink urine          Data Review   Recent Results (from the past 24 hours)   POC Creatinine and Potassium    Collection Time: 07/08/25 11:42 AM   Result Value Ref Range    POC Creatinine 1.13 0.8 - 1.5 mg/dL    POC Potassium 3.7 3.5 - 5.1 MMOL/L    eGFR, POC 73 >60 ml/min/1.73m2   POCT Glucose    Collection Time: 07/08/25 11:44 AM   Result Value Ref Range    POC Glucose 128 (H) 65 - 100 mg/dL    Performed by: Niranjan    TYPE AND SCREEN    Collection Time: 07/08/25 11:45 AM   Result Value Ref Range    Crossmatch expiration date 07/11/2025,2359     ABO/Rh A POSITIVE     Antibody Screen NEG    POCT Glucose    Collection Time: 07/08/25  3:01 PM   Result Value Ref Range    POC Glucose 115 (H) 65 - 100 mg/dL    Performed by: Barbi    POCT Glucose    Collection Time: 07/08/25  4:49 PM   Result Value Ref Range    POC Glucose 123 (H) 65 - 100 mg/dL    Performed by: HarrisNicholePCT    Hemoglobin A1c    Collection Time: 07/08/25  5:15 PM   Result Value Ref Range    Hemoglobin A1C 6.4 (H) 0 - 5.6 %    Estimated Avg Glucose 136 mg/dL   POCT Glucose  [facility]  Cardiovascular Clinic Note    Mitra Bermudez  : 1924  PCP: Jw Choe MD    Chief Complaint: Hospital discharge appointment    History of Present Illness:    Mitra Bermudez is a 89 year old man who presents today after he was recently hospitalized in [place]. Presented to the ER with chest pain concerning for angina.    He has a history of coronary artery disease with PCI in 2016.  2019.  He had a left heart cath:  Left main is large with mid eccentric 30% stenosis  LAD in-stent restenosis focally of 75% (medical management was recommended)  Circumflex is tortuous.  In its distal segment before it trifurcates into the OM there is a 95% stenosis.  Second diagonal branch is collateralizing the right PD.   lesion to RCA.  He underwent successful rotational atherectomy and stenting of the distal circumflex with a drug-coated stent.      PMHx:  Past Medical History:   Diagnosis Date   • Coronary artery disease    • DVT (deep venous thrombosis) (CMS/[facility])     right leg DVT ,   • Essential (primary) hypertension    • Other and unspecified hyperlipidemia    • Personal history of traumatic fracture     right ankle    • TIA (transient ischemic attack)          PSHx:  Past Surgical History:   Procedure Laterality Date   • Cardiac catherization     • Ptca      stent placed when living in [place]    • Ptca with stent      PTCA with stent x3 2016 and stent      Family Hx:  Family History   Problem Relation Age of Onset   • Coronary Artery Disease Neg Hx         Negative for premature CAD.   • Aneurysm Neg Hx         Negative for AAA.     Social Hx:  he  reports that he has never smoked. He has never used smokeless tobacco. He reports that he drinks alcohol. He reports that he does not use drugs.  Allergies:  ALLERGIES:  No Known Allergies  Medications:  Current Outpatient Medications   Medication Sig Dispense Refill   • lisinopril (ZESTRIL) 10 MG tablet  Take 10 mg by mouth 2 times daily.     • rosuvastatin (CRESTOR) 10 MG tablet Take 10 mg by mouth daily.     • nitroGLYcerin (NITROSTAT) 0.4 MG sublingual tablet Place 1 tablet under the tongue every 5 minutes as needed for Chest pain. 25 tablet 1   • isosorbide mononitrate (IMDUR) 30 MG 24 hr tablet TAKE 1 TABLET DAILY.     • ranitidine (ZANTAC) 150 MG tablet one by mouth daily as needed     • clopidogrel (PLAVIX) 75 MG tablet TAKE ONE TABLET BY MOUTH DAILY     • Multiple Vitamins-Minerals (MULTIVITAMIN ADULT PO) one daily     • aspirin 81 MG tablet Take 81 mg by mouth daily.       No current facility-administered medications for this visit.      Review of Systems:  Review of Systems   Constitution: Negative for chills, fever, weight gain and weight loss.   HENT: Negative for hearing loss.    Eyes:        Patient denies significant visual changes   Cardiovascular: Negative for chest pain and claudication.        Negative except for what's indicated in HPI   Respiratory: Negative for cough and hemoptysis.    Hematologic/Lymphatic: Does not bruise/bleed easily.   Skin: Negative for rash and suspicious lesions.   Musculoskeletal: Negative for arthritis.   Gastrointestinal: Negative for hematochezia and melena.   Genitourinary: Negative for hematuria.   Neurological:        No localized deficits   Allergic/Immunologic: Negative for environmental allergies.        No new food allergies     Physical Exam:  Visit Vitals  /72   Pulse 64   Ht 5' 3\" (1.6 m)   Wt 68 kg (150 lb)   BMI 26.57 kg/m²     Physical Exam   Constitutional: He is well-developed, well-nourished, and in no distress.   HENT:   Head: Normocephalic.   Neck: Normal range of motion. No hepatojugular reflux and no JVD present. Carotid bruit is not present.   Cardiovascular: Normal rate, regular rhythm, S1 normal, S2 normal and intact distal pulses. PMI is not displaced.   Pulmonary/Chest: Effort normal and breath sounds normal. No respiratory distress.    Abdominal: Normal appearance and bowel sounds are normal. There is no tenderness.   Musculoskeletal: Normal range of motion.   Skin: Skin is warm, dry and intact.   Psychiatric: Mood, memory, affect and judgment normal.     Assessment/Plan:  1.  CAD, prior history of PCI LAD   -Recently presented to [facility] with 95% focal lesion in circumflex and underwent PCI.  LAD has 75% in-stent restenosis for which patient is asymptomatic.  Recommend nuclear stress testing prior to follow-up visit.  Will call for any changes. Declines cardiac rehab  2.  Hyperlipidemia, remains on Crestor.  He is due to have these repeated next week    Follow up: 6-week with Dr. Diaz with stress test prior to appointment    Diagnosis:  Patient Active Problem List   Diagnosis   • S/P drug eluting coronary stent placement   • Benign essential HTN   • Atherosclerosis of native coronary artery of native heart without angina pectoris       Thank you for allowing me to see this patient in the office. Please call us with any questions.    Julie A Frommelt, CNP  [facility] [place]

## 2025-07-09 NOTE — CARE COORDINATION
66 y/o M admitted with BPH.  Chart reviewed and met with pt at bedside.   Demographics reviewed and verified.  Lives alone in a one story home with 6 steps to enter.  Girlfriend Irene will stay with him if needed.  Independent with ADLs  Insured, has prescription coverage, establiseh with a PCP Dr Jasmine.  Current . Irene will pick him up at discharge.  DME: none  Denies ever using HH or going to STR.    Pt is under Observation MOON letter given.  Pt plans to discharge home with no needs verbalized at this time.  Will continue to follow and make referrals as needed.    ASSESSMENT NOTE    Attending Physician: Arron Clayton MD  Admit Problem: Bladder stone [N21.0]  Enlarged prostate with lower urinary tract symptoms (LUTS) [N40.1]  Benign prostatic hyperplasia with urinary obstruction and other lower urinary tract symptoms [N40.1, N13.8]  BPH with urinary obstruction [N40.1, N13.8]  Date/Time of Admission: 7/8/2025 10:03 AM  Problem List:  Patient Active Problem List   Diagnosis    Hyperglycemia    Chronic back pain    Spinal stenosis    Vitamin D deficiency    Depression    Spinal stenosis of lumbar region    Spondylolisthesis of lumbar region    Diabetes mellitus type 2, controlled (HCC)    Volume overload    Hypertensive urgency    HLD (hyperlipidemia)    Atrial fibrillation (HCC)    Essential hypertension    Nonischemic cardiomyopathy (HCC)    BPH with obstruction/lower urinary tract symptoms    BPH with urinary obstruction    Bladder stone    Enlarged prostate with lower urinary tract symptoms (LUTS)    Benign prostatic hyperplasia with urinary obstruction and other lower urinary tract symptoms          07/09/25 1124   Service Assessment   Patient Orientation Alert and Oriented   Cognition Alert   History Provided By Patient   Primary Caregiver Self   Support Systems Other (Comment)   Patient's Healthcare Decision Maker is: Legal Next of Kin   PCP Verified by CM Yes   Last Visit to PCP Within last two

## 2025-07-09 NOTE — CARE COORDINATION
Pt is for discharge today. He will need to f/u OP with urology. No other needs have been identified for this admission. Pt has met all goals of care and is in agreement with this discharge plan.   07/09/25 1401   Services At/After Discharge   Transition of Care Consult (CM Consult) Discharge Planning   Services At/After Discharge None    Resource Information Provided? No   Mode of Transport at Discharge Other (see comment)   Confirm Follow Up Transport Family   Condition of Participation: Discharge Planning   The Plan for Transition of Care is related to the following treatment goals: Pt will return home and follow up OP with Urology.   The Patient and/or Patient Representative was provided with a Choice of Provider? Patient   The Patient and/Or Patient Representative agree with the Discharge Plan? Yes   Freedom of Choice list was provided with basic dialogue that supports the patient's individualized plan of care/goals, treatment preferences, and shares the quality data associated with the providers?  Yes

## 2025-07-09 NOTE — DISCHARGE SUMMARY
Discharge Summary    Date: 7/9/2025  Patient Name: Edison Wright    YOB: 1960     Age: 65 y.o.    Admit Date: 7/8/2025  Discharge Date: 7/9/2025  Discharge Condition: Stable    Admission Diagnosis  Bladder stone [N21.0];Enlarged prostate with lower urinary tract symptoms (LUTS) [N40.1];Benign prostatic hyperplasia with urinary obstruction and other lower urinary tract symptoms [N40.1, N13.8];BPH with urinary obstruction [N40.1, N13.8]      Discharge Diagnosis  Principal Problem:    Benign prostatic hyperplasia with urinary obstruction and other lower urinary tract symptoms  Active Problems:    BPH with urinary obstruction    Bladder stone    Enlarged prostate with lower urinary tract symptoms (LUTS)  Resolved Problems:    * No resolved hospital problems. *      Hospital Stay  Narrative of Hospital Course:  The patient underwent transurethral resection of prostate. Procedure was without complications. Following the procedure, patient was transferred to PACU and admitted to the urology floor for convalescence. Postoperatively, the patient progressed as expected. CBI was weaned as urine cleared. On the day of discharge, mak was removed, and patient passed voiding trial.     Consultants:  None    Surgeries/procedures Performed:  Cystolitholapaxy for bladder stone greater than 2.5 cm, cystoscopy, transurethral resection of prostate, bipolar.          Treatments:    IV Hydration, Antibiotics and Surgery        Discharge Plan/Disposition:  Home    Hospital/Incidental Findings Requiring Follow Up:    Patient Instructions:    Diet:    Activity:No Heavy Lifting  For number of days (if applicable):      Other Instructions:    Provider Follow-Up:   No follow-ups on file.     Significant Diagnostic Studies:    Recent Labs:  Admission on 07/08/2025  Crossmatch expiration date                    Date: 07/08/2025  Value: 07/11/2025,2359                       Status: Final  ABO/Rh

## 2025-07-23 ENCOUNTER — OFFICE VISIT (OUTPATIENT)
Dept: UROLOGY | Age: 65
End: 2025-07-23
Payer: MEDICARE

## 2025-07-23 DIAGNOSIS — N13.8 BPH WITH OBSTRUCTION/LOWER URINARY TRACT SYMPTOMS: ICD-10-CM

## 2025-07-23 DIAGNOSIS — R33.9 URINARY RETENTION: Primary | ICD-10-CM

## 2025-07-23 DIAGNOSIS — R31.0 GROSS HEMATURIA: ICD-10-CM

## 2025-07-23 DIAGNOSIS — N40.1 BPH WITH OBSTRUCTION/LOWER URINARY TRACT SYMPTOMS: ICD-10-CM

## 2025-07-23 DIAGNOSIS — N39.0 RECURRENT UTI: ICD-10-CM

## 2025-07-23 DIAGNOSIS — N28.89 RIGHT KIDNEY MASS: ICD-10-CM

## 2025-07-23 DIAGNOSIS — N21.0 BLADDER STONE: ICD-10-CM

## 2025-07-23 LAB
BILIRUBIN, URINE, POC: NEGATIVE
BLOOD URINE, POC: NORMAL
GLUCOSE URINE, POC: NEGATIVE MG/DL
KETONES, URINE, POC: NEGATIVE MG/DL
LEUKOCYTE ESTERASE, URINE, POC: NORMAL
NITRITE, URINE, POC: NEGATIVE
PH, URINE, POC: 6 (ref 4.6–8)
PROTEIN,URINE, POC: 300 MG/DL
PVR, POC: 0 CC
SPECIFIC GRAVITY, URINE, POC: 1.01 (ref 1–1.03)
URINALYSIS CLARITY, POC: NORMAL
URINALYSIS COLOR, POC: NORMAL
UROBILINOGEN, POC: NORMAL MG/DL

## 2025-07-23 PROCEDURE — G8417 CALC BMI ABV UP PARAM F/U: HCPCS | Performed by: NURSE PRACTITIONER

## 2025-07-23 PROCEDURE — 99213 OFFICE O/P EST LOW 20 MIN: CPT | Performed by: NURSE PRACTITIONER

## 2025-07-23 PROCEDURE — 3017F COLORECTAL CA SCREEN DOC REV: CPT | Performed by: NURSE PRACTITIONER

## 2025-07-23 PROCEDURE — 1036F TOBACCO NON-USER: CPT | Performed by: NURSE PRACTITIONER

## 2025-07-23 PROCEDURE — G8427 DOCREV CUR MEDS BY ELIG CLIN: HCPCS | Performed by: NURSE PRACTITIONER

## 2025-07-23 PROCEDURE — 51798 US URINE CAPACITY MEASURE: CPT | Performed by: NURSE PRACTITIONER

## 2025-07-23 PROCEDURE — 81003 URINALYSIS AUTO W/O SCOPE: CPT | Performed by: NURSE PRACTITIONER

## 2025-07-23 RX ORDER — HYOSCYAMINE SULFATE 0.12 MG/1
0.12 TABLET SUBLINGUAL EVERY 6 HOURS PRN
Qty: 20 TABLET | Refills: 0 | Status: SHIPPED | OUTPATIENT
Start: 2025-07-23

## 2025-07-23 RX ORDER — PHENAZOPYRIDINE HYDROCHLORIDE 200 MG/1
200 TABLET, FILM COATED ORAL 3 TIMES DAILY PRN
Qty: 15 TABLET | Refills: 0 | Status: SHIPPED | OUTPATIENT
Start: 2025-07-23 | End: 2025-07-28

## 2025-07-23 NOTE — PROGRESS NOTES
[facility]  [address]   [address]  [place] [address]  [phone]    Edison Wright  : 1960    Chief Complaint   Patient presents with    Follow-up          HPI     Edison Wright is a 65 y.o. male followed for UTI's, BPH/LUTS, and urinary retention.    S/P TURP in . Cont on on tamsulosin and finasteride daily. During workup in 2025, a CT abdomen and pelvis were incidentally revealed two masses on the right kidney, measuring 2 cm and 1 cm, concerning for renal cell carcinoma. These masses are classified as stage I and are confined to the kidney. He also has chronic kidney disease (CKD) with a GFR of 46 and baseline creatinine of 1.66. Additionally, he has a benign lipid-rich right adrenal adenoma without concern for metastatic disease.    S/P cystoscopy, litholapaxy, and repeat TURP on 2025. 2.5 cm bladder stone. Prostate path benign.       Has seen Dr Warner. Planning cryoablation of renal masses 25.    Today he reports hematuria and some dribbling. Pain w urination. PVR 0 cc.    Lab Results   Component Value Date    PSA 3.6 2025    PSA 4.7 (H) 2023             Past Medical History:   Diagnosis Date    Arthritis     Atrial fibrillation (HCC)     patient was taking eliquis. per patient he has not taken since \"last week of september\"    Chronic back pain     Colon cancer (HCC)     Partial Colectomy and Chemo.    Depression 2015    managed well with meds    Diabetes (HCC)     oral agent only/pt doesnt monitor BS at home/no s.s of hypoglycemia/    Former cigarette smoker     History of GI bleed     HTN (hypertension) 2014    controlled well with meds    Hypercholesteremia     controlled well with meds    Nonischemic cardiomyopathy (HCC)     Followed by Dr. Shah     Past Surgical History:   Procedure Laterality Date    CHOLECYSTECTOMY, LAPAROSCOPIC      COLONOSCOPY      CYSTOSCOPY N/A 2025    CYSTOSCOPY LITHOLAPAXY performed

## 2025-07-25 LAB
BACTERIA SPEC CULT: ABNORMAL
SERVICE CMNT-IMP: ABNORMAL

## 2025-08-05 ENCOUNTER — ANESTHESIA EVENT (OUTPATIENT)
Dept: CT IMAGING | Age: 65
End: 2025-08-05
Payer: MEDICARE

## 2025-08-05 RX ORDER — SODIUM CHLORIDE 9 MG/ML
INJECTION, SOLUTION INTRAVENOUS PRN
OUTPATIENT
Start: 2025-08-05

## 2025-08-05 RX ORDER — MIDAZOLAM HYDROCHLORIDE 2 MG/2ML
2 INJECTION, SOLUTION INTRAMUSCULAR; INTRAVENOUS
OUTPATIENT
Start: 2025-08-05

## 2025-08-05 RX ORDER — SODIUM CHLORIDE 0.9 % (FLUSH) 0.9 %
5-40 SYRINGE (ML) INJECTION EVERY 12 HOURS SCHEDULED
OUTPATIENT
Start: 2025-08-05

## 2025-08-05 RX ORDER — FENTANYL CITRATE 50 UG/ML
100 INJECTION, SOLUTION INTRAMUSCULAR; INTRAVENOUS
Refills: 0 | OUTPATIENT
Start: 2025-08-05

## 2025-08-05 RX ORDER — LIDOCAINE HYDROCHLORIDE 10 MG/ML
1 INJECTION, SOLUTION INFILTRATION; PERINEURAL
OUTPATIENT
Start: 2025-08-05

## 2025-08-05 RX ORDER — SODIUM CHLORIDE 0.9 % (FLUSH) 0.9 %
5-40 SYRINGE (ML) INJECTION PRN
OUTPATIENT
Start: 2025-08-05

## 2025-08-05 RX ORDER — SODIUM CHLORIDE, SODIUM LACTATE, POTASSIUM CHLORIDE, CALCIUM CHLORIDE 600; 310; 30; 20 MG/100ML; MG/100ML; MG/100ML; MG/100ML
INJECTION, SOLUTION INTRAVENOUS CONTINUOUS
OUTPATIENT
Start: 2025-08-05

## 2025-08-06 ENCOUNTER — ANESTHESIA (OUTPATIENT)
Dept: CT IMAGING | Age: 65
End: 2025-08-06
Payer: MEDICARE

## 2025-08-06 ENCOUNTER — HOSPITAL ENCOUNTER (OUTPATIENT)
Dept: CT IMAGING | Age: 65
Discharge: HOME | End: 2025-08-08
Payer: MEDICARE

## 2025-08-06 VITALS
DIASTOLIC BLOOD PRESSURE: 86 MMHG | RESPIRATION RATE: 16 BRPM | OXYGEN SATURATION: 97 % | TEMPERATURE: 98 F | SYSTOLIC BLOOD PRESSURE: 135 MMHG | HEART RATE: 72 BPM

## 2025-08-06 DIAGNOSIS — N28.89 RIGHT KIDNEY MASS: ICD-10-CM

## 2025-08-06 DIAGNOSIS — N28.89 RIGHT RENAL MASS: Primary | ICD-10-CM

## 2025-08-06 LAB
GLUCOSE BLD STRIP.AUTO-MCNC: 126 MG/DL (ref 65–100)
SERVICE CMNT-IMP: ABNORMAL

## 2025-08-06 PROCEDURE — 6360000002 HC RX W HCPCS: Performed by: RADIOLOGY

## 2025-08-06 PROCEDURE — 50593 PERC CRYO ABLATE RENAL TUM: CPT | Performed by: RADIOLOGY

## 2025-08-06 PROCEDURE — 2500000003 HC RX 250 WO HCPCS

## 2025-08-06 PROCEDURE — 6360000002 HC RX W HCPCS

## 2025-08-06 PROCEDURE — 2580000003 HC RX 258

## 2025-08-06 PROCEDURE — C2618 PROBE/NEEDLE, CRYO: HCPCS

## 2025-08-06 PROCEDURE — 82962 GLUCOSE BLOOD TEST: CPT

## 2025-08-06 PROCEDURE — 77013 CT GUIDE FOR TISSUE ABLATION: CPT | Performed by: RADIOLOGY

## 2025-08-06 RX ORDER — LIDOCAINE HYDROCHLORIDE 10 MG/ML
INJECTION, SOLUTION EPIDURAL; INFILTRATION; INTRACAUDAL; PERINEURAL PRN
Status: COMPLETED | OUTPATIENT
Start: 2025-08-06 | End: 2025-08-06

## 2025-08-06 RX ORDER — PROPOFOL 10 MG/ML
INJECTION, EMULSION INTRAVENOUS
Status: DISCONTINUED | OUTPATIENT
Start: 2025-08-06 | End: 2025-08-06 | Stop reason: SDUPTHER

## 2025-08-06 RX ORDER — OXYCODONE HYDROCHLORIDE 5 MG/1
5 TABLET ORAL
Status: DISCONTINUED | OUTPATIENT
Start: 2025-08-06 | End: 2025-08-09 | Stop reason: HOSPADM

## 2025-08-06 RX ORDER — EPHEDRINE SULFATE 5 MG/ML
INJECTION INTRAVENOUS
Status: DISCONTINUED | OUTPATIENT
Start: 2025-08-06 | End: 2025-08-06 | Stop reason: SDUPTHER

## 2025-08-06 RX ORDER — PROCHLORPERAZINE EDISYLATE 5 MG/ML
5 INJECTION INTRAMUSCULAR; INTRAVENOUS
Status: DISCONTINUED | OUTPATIENT
Start: 2025-08-06 | End: 2025-08-09 | Stop reason: HOSPADM

## 2025-08-06 RX ORDER — DEXAMETHASONE SODIUM PHOSPHATE 10 MG/ML
INJECTION, SOLUTION INTRA-ARTICULAR; INTRALESIONAL; INTRAMUSCULAR; INTRAVENOUS; SOFT TISSUE
Status: DISCONTINUED | OUTPATIENT
Start: 2025-08-06 | End: 2025-08-06 | Stop reason: SDUPTHER

## 2025-08-06 RX ORDER — ONDANSETRON 2 MG/ML
4 INJECTION INTRAMUSCULAR; INTRAVENOUS
Status: DISCONTINUED | OUTPATIENT
Start: 2025-08-06 | End: 2025-08-09 | Stop reason: HOSPADM

## 2025-08-06 RX ORDER — ONDANSETRON 2 MG/ML
INJECTION INTRAMUSCULAR; INTRAVENOUS
Status: DISCONTINUED | OUTPATIENT
Start: 2025-08-06 | End: 2025-08-06 | Stop reason: SDUPTHER

## 2025-08-06 RX ORDER — PHENYLEPHRINE HYDROCHLORIDE 10 MG/ML
INJECTION INTRAVENOUS
Status: DISCONTINUED | OUTPATIENT
Start: 2025-08-06 | End: 2025-08-06 | Stop reason: SDUPTHER

## 2025-08-06 RX ORDER — ROCURONIUM BROMIDE 10 MG/ML
INJECTION, SOLUTION INTRAVENOUS
Status: DISCONTINUED | OUTPATIENT
Start: 2025-08-06 | End: 2025-08-06 | Stop reason: SDUPTHER

## 2025-08-06 RX ORDER — LIDOCAINE HYDROCHLORIDE 20 MG/ML
INJECTION, SOLUTION EPIDURAL; INFILTRATION; INTRACAUDAL; PERINEURAL
Status: DISCONTINUED | OUTPATIENT
Start: 2025-08-06 | End: 2025-08-06 | Stop reason: SDUPTHER

## 2025-08-06 RX ORDER — SODIUM CHLORIDE, SODIUM LACTATE, POTASSIUM CHLORIDE, CALCIUM CHLORIDE 600; 310; 30; 20 MG/100ML; MG/100ML; MG/100ML; MG/100ML
INJECTION, SOLUTION INTRAVENOUS
Status: DISCONTINUED | OUTPATIENT
Start: 2025-08-06 | End: 2025-08-06 | Stop reason: SDUPTHER

## 2025-08-06 RX ADMIN — ROCURONIUM BROMIDE 50 MG: 10 INJECTION, SOLUTION INTRAVENOUS at 08:19

## 2025-08-06 RX ADMIN — LIDOCAINE HYDROCHLORIDE 100 MG: 20 INJECTION, SOLUTION EPIDURAL; INFILTRATION; INTRACAUDAL; PERINEURAL at 08:18

## 2025-08-06 RX ADMIN — ROCURONIUM BROMIDE 10 MG: 10 INJECTION, SOLUTION INTRAVENOUS at 08:53

## 2025-08-06 RX ADMIN — EPHEDRINE SULFATE 10 MG: 5 INJECTION INTRAVENOUS at 08:49

## 2025-08-06 RX ADMIN — PHENYLEPHRINE HYDROCHLORIDE 50 MCG: 10 INJECTION INTRAVENOUS at 09:13

## 2025-08-06 RX ADMIN — PHENYLEPHRINE HYDROCHLORIDE 100 MCG: 10 INJECTION INTRAVENOUS at 09:00

## 2025-08-06 RX ADMIN — ONDANSETRON 4 MG: 2 INJECTION, SOLUTION INTRAMUSCULAR; INTRAVENOUS at 09:13

## 2025-08-06 RX ADMIN — PHENYLEPHRINE HYDROCHLORIDE 150 MCG: 10 INJECTION INTRAVENOUS at 09:05

## 2025-08-06 RX ADMIN — EPHEDRINE SULFATE 10 MG: 5 INJECTION INTRAVENOUS at 09:13

## 2025-08-06 RX ADMIN — WATER 2000 MG: 1 INJECTION INTRAMUSCULAR; INTRAVENOUS; SUBCUTANEOUS at 08:37

## 2025-08-06 RX ADMIN — EPHEDRINE SULFATE 10 MG: 5 INJECTION INTRAVENOUS at 08:40

## 2025-08-06 RX ADMIN — SUGAMMADEX 200 MG: 100 INJECTION, SOLUTION INTRAVENOUS at 09:57

## 2025-08-06 RX ADMIN — LIDOCAINE HYDROCHLORIDE 10 ML: 10 INJECTION, SOLUTION EPIDURAL; INFILTRATION; INTRACAUDAL; PERINEURAL at 08:54

## 2025-08-06 RX ADMIN — PROPOFOL 170 MG: 10 INJECTION, EMULSION INTRAVENOUS at 08:18

## 2025-08-06 RX ADMIN — PHENYLEPHRINE HYDROCHLORIDE 100 MCG: 10 INJECTION INTRAVENOUS at 08:32

## 2025-08-06 RX ADMIN — DEXAMETHASONE SODIUM PHOSPHATE 10 MG: 10 INJECTION INTRAMUSCULAR; INTRAVENOUS at 09:13

## 2025-08-06 RX ADMIN — PHENYLEPHRINE HYDROCHLORIDE 200 MCG: 10 INJECTION INTRAVENOUS at 08:40

## 2025-08-06 RX ADMIN — PHENYLEPHRINE HYDROCHLORIDE 200 MCG: 10 INJECTION INTRAVENOUS at 08:49

## 2025-08-06 RX ADMIN — PHENYLEPHRINE HYDROCHLORIDE 200 MCG: 10 INJECTION INTRAVENOUS at 08:36

## 2025-08-06 RX ADMIN — EPHEDRINE SULFATE 5 MG: 5 INJECTION INTRAVENOUS at 09:05

## 2025-08-06 RX ADMIN — SODIUM CHLORIDE, SODIUM LACTATE, POTASSIUM CHLORIDE, AND CALCIUM CHLORIDE: 600; 310; 30; 20 INJECTION, SOLUTION INTRAVENOUS at 08:12

## 2025-08-06 RX ADMIN — PHENYLEPHRINE HYDROCHLORIDE 200 MCG: 10 INJECTION INTRAVENOUS at 09:38

## 2025-08-06 RX ADMIN — EPHEDRINE SULFATE 10 MG: 5 INJECTION INTRAVENOUS at 09:00

## 2025-08-06 RX ADMIN — PHENYLEPHRINE HYDROCHLORIDE 100 MCG: 10 INJECTION INTRAVENOUS at 09:30

## 2025-08-06 ASSESSMENT — PAIN - FUNCTIONAL ASSESSMENT
PAIN_FUNCTIONAL_ASSESSMENT: NONE - DENIES PAIN
PAIN_FUNCTIONAL_ASSESSMENT: NONE - DENIES PAIN

## 2025-08-08 ENCOUNTER — OFFICE VISIT (OUTPATIENT)
Age: 65
End: 2025-08-08
Payer: MEDICARE

## 2025-08-08 VITALS
HEART RATE: 58 BPM | DIASTOLIC BLOOD PRESSURE: 78 MMHG | HEIGHT: 76 IN | SYSTOLIC BLOOD PRESSURE: 102 MMHG | WEIGHT: 231 LBS | BODY MASS INDEX: 28.13 KG/M2

## 2025-08-08 DIAGNOSIS — E78.2 MIXED HYPERLIPIDEMIA: ICD-10-CM

## 2025-08-08 DIAGNOSIS — I48.11 LONGSTANDING PERSISTENT ATRIAL FIBRILLATION (HCC): Primary | ICD-10-CM

## 2025-08-08 DIAGNOSIS — R31.0 GROSS HEMATURIA: ICD-10-CM

## 2025-08-08 DIAGNOSIS — I10 ESSENTIAL HYPERTENSION: ICD-10-CM

## 2025-08-08 DIAGNOSIS — I42.8 NONISCHEMIC CARDIOMYOPATHY (HCC): ICD-10-CM

## 2025-08-08 DIAGNOSIS — Z53.20: ICD-10-CM

## 2025-08-08 PROCEDURE — G8417 CALC BMI ABV UP PARAM F/U: HCPCS | Performed by: INTERNAL MEDICINE

## 2025-08-08 PROCEDURE — 1036F TOBACCO NON-USER: CPT | Performed by: INTERNAL MEDICINE

## 2025-08-08 PROCEDURE — 3074F SYST BP LT 130 MM HG: CPT | Performed by: INTERNAL MEDICINE

## 2025-08-08 PROCEDURE — G8427 DOCREV CUR MEDS BY ELIG CLIN: HCPCS | Performed by: INTERNAL MEDICINE

## 2025-08-08 PROCEDURE — 99214 OFFICE O/P EST MOD 30 MIN: CPT | Performed by: INTERNAL MEDICINE

## 2025-08-08 PROCEDURE — 3017F COLORECTAL CA SCREEN DOC REV: CPT | Performed by: INTERNAL MEDICINE

## 2025-08-08 PROCEDURE — 3078F DIAST BP <80 MM HG: CPT | Performed by: INTERNAL MEDICINE

## 2025-08-08 ASSESSMENT — ENCOUNTER SYMPTOMS: SHORTNESS OF BREATH: 0

## 2025-08-11 ENCOUNTER — TELEPHONE (OUTPATIENT)
Dept: UROLOGY | Age: 65
End: 2025-08-11

## 2025-08-13 ENCOUNTER — CLINICAL SUPPORT (OUTPATIENT)
Dept: UROLOGY | Age: 65
End: 2025-08-13
Payer: MEDICARE

## 2025-08-13 DIAGNOSIS — N40.1 BPH WITH OBSTRUCTION/LOWER URINARY TRACT SYMPTOMS: Primary | ICD-10-CM

## 2025-08-13 DIAGNOSIS — N40.1 BPH WITH OBSTRUCTION/LOWER URINARY TRACT SYMPTOMS: ICD-10-CM

## 2025-08-13 DIAGNOSIS — R33.9 URINARY RETENTION: ICD-10-CM

## 2025-08-13 DIAGNOSIS — N39.0 RECURRENT UTI: ICD-10-CM

## 2025-08-13 DIAGNOSIS — N13.8 BPH WITH OBSTRUCTION/LOWER URINARY TRACT SYMPTOMS: Primary | ICD-10-CM

## 2025-08-13 DIAGNOSIS — N13.8 BPH WITH OBSTRUCTION/LOWER URINARY TRACT SYMPTOMS: ICD-10-CM

## 2025-08-13 LAB
BILIRUBIN, URINE, POC: NEGATIVE
BLOOD URINE, POC: ABNORMAL
GLUCOSE URINE, POC: NEGATIVE MG/DL
KETONES, URINE, POC: NEGATIVE MG/DL
LEUKOCYTE ESTERASE, URINE, POC: ABNORMAL
NITRITE, URINE, POC: POSITIVE
PH, URINE, POC: 5.5 (ref 4.6–8)
PROTEIN,URINE, POC: 30 MG/DL
SPECIFIC GRAVITY, URINE, POC: 1.01 (ref 1–1.03)
URINALYSIS CLARITY, POC: ABNORMAL
URINALYSIS COLOR, POC: ABNORMAL
UROBILINOGEN, POC: ABNORMAL MG/DL

## 2025-08-13 PROCEDURE — 81003 URINALYSIS AUTO W/O SCOPE: CPT | Performed by: NURSE PRACTITIONER

## 2025-08-13 RX ORDER — SULFAMETHOXAZOLE AND TRIMETHOPRIM 800; 160 MG/1; MG/1
1 TABLET ORAL 2 TIMES DAILY
Qty: 14 TABLET | Refills: 0 | Status: SHIPPED | OUTPATIENT
Start: 2025-08-13 | End: 2025-08-20

## 2025-08-13 RX ORDER — METHENAMINE HIPPURATE 1000 MG/1
1 TABLET ORAL 2 TIMES DAILY WITH MEALS
Qty: 180 TABLET | Refills: 1 | Status: SHIPPED | OUTPATIENT
Start: 2025-08-13

## 2025-08-15 LAB
BACTERIA SPEC CULT: NORMAL
SERVICE CMNT-IMP: NORMAL

## 2025-08-18 ENCOUNTER — HOSPITAL ENCOUNTER (EMERGENCY)
Age: 65
Discharge: HOME | End: 2025-08-18
Attending: EMERGENCY MEDICINE
Payer: MEDICARE

## 2025-08-18 ENCOUNTER — APPOINTMENT (OUTPATIENT)
Dept: GENERAL RADIOLOGY | Age: 65
End: 2025-08-18
Payer: MEDICARE

## 2025-08-18 VITALS
TEMPERATURE: 98.1 F | SYSTOLIC BLOOD PRESSURE: 116 MMHG | HEART RATE: 91 BPM | HEIGHT: 76 IN | OXYGEN SATURATION: 99 % | DIASTOLIC BLOOD PRESSURE: 75 MMHG | WEIGHT: 231 LBS | RESPIRATION RATE: 18 BRPM | BODY MASS INDEX: 28.13 KG/M2

## 2025-08-18 DIAGNOSIS — T78.40XA ALLERGIC REACTION, INITIAL ENCOUNTER: Primary | ICD-10-CM

## 2025-08-18 LAB
ALBUMIN SERPL-MCNC: 3.7 G/DL (ref 3.2–4.6)
ALBUMIN/GLOB SERPL: 1.2 (ref 1–1.9)
ALP SERPL-CCNC: 71 U/L (ref 40–129)
ALT SERPL-CCNC: 15 U/L (ref 8–55)
ANION GAP SERPL CALC-SCNC: 14 MMOL/L (ref 7–16)
AST SERPL-CCNC: 15 U/L (ref 15–37)
BASOPHILS # BLD: 0.09 K/UL (ref 0–0.2)
BASOPHILS NFR BLD: 1.3 % (ref 0–2)
BILIRUB SERPL-MCNC: 0.5 MG/DL (ref 0–1.2)
BUN SERPL-MCNC: 19 MG/DL (ref 8–23)
CALCIUM SERPL-MCNC: 9.3 MG/DL (ref 8.8–10.2)
CHLORIDE SERPL-SCNC: 105 MMOL/L (ref 98–107)
CO2 SERPL-SCNC: 21 MMOL/L (ref 20–29)
CREAT SERPL-MCNC: 1.6 MG/DL (ref 0.8–1.3)
DIFFERENTIAL METHOD BLD: NORMAL
EOSINOPHIL # BLD: 0.19 K/UL (ref 0–0.8)
EOSINOPHIL NFR BLD: 2.8 % (ref 0.5–7.8)
ERYTHROCYTE [DISTWIDTH] IN BLOOD BY AUTOMATED COUNT: 13.5 % (ref 11.9–14.6)
GLOBULIN SER CALC-MCNC: 3.1 G/DL (ref 2.3–3.5)
GLUCOSE SERPL-MCNC: 118 MG/DL (ref 70–99)
HCT VFR BLD AUTO: 45.2 % (ref 41.1–50.3)
HGB BLD-MCNC: 15 G/DL (ref 13.6–17.2)
IMM GRANULOCYTES # BLD AUTO: 0.02 K/UL (ref 0–0.5)
IMM GRANULOCYTES NFR BLD AUTO: 0.3 % (ref 0–5)
LYMPHOCYTES # BLD: 2.37 K/UL (ref 0.5–4.6)
LYMPHOCYTES NFR BLD: 35.4 % (ref 13–44)
MCH RBC QN AUTO: 29.5 PG (ref 26.1–32.9)
MCHC RBC AUTO-ENTMCNC: 33.2 G/DL (ref 31.4–35)
MCV RBC AUTO: 89 FL (ref 82–102)
MONOCYTES # BLD: 0.47 K/UL (ref 0.1–1.3)
MONOCYTES NFR BLD: 7 % (ref 4–12)
NEUTS SEG # BLD: 3.55 K/UL (ref 1.7–8.2)
NEUTS SEG NFR BLD: 53.2 % (ref 43–78)
NRBC # BLD: 0 K/UL (ref 0–0.2)
PLATELET # BLD AUTO: 201 K/UL (ref 150–450)
PMV BLD AUTO: 10.3 FL (ref 9.4–12.3)
POTASSIUM SERPL-SCNC: 4.1 MMOL/L (ref 3.5–5.1)
PROT SERPL-MCNC: 6.7 G/DL (ref 6.3–8.2)
RBC # BLD AUTO: 5.08 M/UL (ref 4.23–5.6)
SODIUM SERPL-SCNC: 140 MMOL/L (ref 136–145)
WBC # BLD AUTO: 6.7 K/UL (ref 4.3–11.1)

## 2025-08-18 PROCEDURE — 80053 COMPREHEN METABOLIC PANEL: CPT

## 2025-08-18 PROCEDURE — 96375 TX/PRO/DX INJ NEW DRUG ADDON: CPT

## 2025-08-18 PROCEDURE — 85025 COMPLETE CBC W/AUTO DIFF WBC: CPT

## 2025-08-18 PROCEDURE — 99284 EMERGENCY DEPT VISIT MOD MDM: CPT

## 2025-08-18 PROCEDURE — 96374 THER/PROPH/DIAG INJ IV PUSH: CPT

## 2025-08-18 PROCEDURE — 2500000003 HC RX 250 WO HCPCS: Performed by: EMERGENCY MEDICINE

## 2025-08-18 PROCEDURE — 71045 X-RAY EXAM CHEST 1 VIEW: CPT

## 2025-08-18 PROCEDURE — 6360000002 HC RX W HCPCS: Performed by: EMERGENCY MEDICINE

## 2025-08-18 RX ORDER — PREDNISONE 50 MG/1
50 TABLET ORAL DAILY
Qty: 5 TABLET | Refills: 0 | Status: SHIPPED | OUTPATIENT
Start: 2025-08-18 | End: 2025-08-21 | Stop reason: ALTCHOICE

## 2025-08-18 RX ORDER — DIPHENHYDRAMINE HYDROCHLORIDE 50 MG/ML
25 INJECTION, SOLUTION INTRAMUSCULAR; INTRAVENOUS
Status: COMPLETED | OUTPATIENT
Start: 2025-08-18 | End: 2025-08-18

## 2025-08-18 RX ADMIN — FAMOTIDINE 20 MG: 10 INJECTION, SOLUTION INTRAVENOUS at 20:05

## 2025-08-18 RX ADMIN — DIPHENHYDRAMINE HYDROCHLORIDE 25 MG: 50 INJECTION INTRAMUSCULAR; INTRAVENOUS at 20:05

## 2025-08-18 RX ADMIN — WATER 125 MG: 1 INJECTION INTRAMUSCULAR; INTRAVENOUS; SUBCUTANEOUS at 20:04

## 2025-08-18 ASSESSMENT — PAIN SCALES - GENERAL: PAINLEVEL_OUTOF10: 0

## 2025-08-18 ASSESSMENT — PAIN - FUNCTIONAL ASSESSMENT: PAIN_FUNCTIONAL_ASSESSMENT: 0-10

## 2025-08-18 ASSESSMENT — ENCOUNTER SYMPTOMS
SHORTNESS OF BREATH: 0
COUGH: 0

## 2025-08-21 ENCOUNTER — HOSPITAL ENCOUNTER (EMERGENCY)
Age: 65
Discharge: HOME | End: 2025-08-21
Payer: MEDICARE

## 2025-08-21 VITALS
HEIGHT: 76 IN | BODY MASS INDEX: 28.13 KG/M2 | RESPIRATION RATE: 16 BRPM | WEIGHT: 231 LBS | HEART RATE: 52 BPM | OXYGEN SATURATION: 99 % | TEMPERATURE: 97.9 F | DIASTOLIC BLOOD PRESSURE: 63 MMHG | SYSTOLIC BLOOD PRESSURE: 103 MMHG

## 2025-08-21 DIAGNOSIS — T78.40XA ALLERGIC REACTION, INITIAL ENCOUNTER: ICD-10-CM

## 2025-08-21 DIAGNOSIS — L29.9 PRURITUS: Primary | ICD-10-CM

## 2025-08-21 PROBLEM — N18.2 CHRONIC KIDNEY DISEASE, STAGE 2 (MILD): Status: ACTIVE | Noted: 2025-08-21

## 2025-08-21 PROBLEM — N42.9 DISORDER OF PROSTATE, UNSPECIFIED: Status: ACTIVE | Noted: 2025-08-21

## 2025-08-21 PROBLEM — I50.20 SYSTOLIC HEART FAILURE (HCC): Status: ACTIVE | Noted: 2025-08-21

## 2025-08-21 PROBLEM — I10 HYPERTENSION: Status: ACTIVE | Noted: 2025-08-21

## 2025-08-21 PROBLEM — E11.51 TYPE 2 DIABETES MELLITUS WITH PERIPHERAL ANGIOPATHY (HCC): Status: ACTIVE | Noted: 2025-08-21

## 2025-08-21 PROBLEM — M46.00 SPINAL ENTHESOPATHY: Status: ACTIVE | Noted: 2025-08-21

## 2025-08-21 PROBLEM — G89.29 CHRONIC PAIN: Status: ACTIVE | Noted: 2025-08-21

## 2025-08-21 PROBLEM — E11.9 TYPE 2 DIABETES MELLITUS (HCC): Status: ACTIVE | Noted: 2025-08-21

## 2025-08-21 PROBLEM — Z85.038 HISTORY OF MALIGNANT NEOPLASM OF COLON: Status: ACTIVE | Noted: 2025-08-21

## 2025-08-21 PROBLEM — K08.20: Status: ACTIVE | Noted: 2025-08-21

## 2025-08-21 PROBLEM — I70.0 ATHEROSCLEROSIS OF AORTA: Status: ACTIVE | Noted: 2025-08-21

## 2025-08-21 PROBLEM — I48.11 LONGSTANDING PERSISTENT ATRIAL FIBRILLATION (HCC): Status: ACTIVE | Noted: 2025-08-21

## 2025-08-21 PROBLEM — M47.817 OSTEOARTHRITIS OF LUMBOSACRAL SPINE: Status: ACTIVE | Noted: 2025-08-21

## 2025-08-21 PROBLEM — F41.1 GENERALIZED ANXIETY DISORDER: Status: ACTIVE | Noted: 2025-08-21

## 2025-08-21 PROBLEM — E11.36 DIABETIC CATARACT, ASSOCIATED WITH TYPE 2 DIABETES MELLITUS (HCC): Status: ACTIVE | Noted: 2025-08-21

## 2025-08-21 PROBLEM — Z79.01 ANTICOAGULANT LONG-TERM USE: Status: ACTIVE | Noted: 2025-08-21

## 2025-08-21 PROBLEM — I13.0 HYPERTENSIVE HEART AND CHRONIC KIDNEY DISEASE WITH HEART FAILURE AND STAGE 1 THROUGH STAGE 4 CHRONIC KIDNEY DISEASE, OR UNSPECIFIED CHRONIC KIDNEY DISEASE (HCC): Status: ACTIVE | Noted: 2025-08-21

## 2025-08-21 PROBLEM — R19.5 ABNORMAL FECES: Status: ACTIVE | Noted: 2025-08-21

## 2025-08-21 PROBLEM — D68.59 THROMBOPHILIA: Status: ACTIVE | Noted: 2025-08-21

## 2025-08-21 PROBLEM — I42.0 DILATED CARDIOMYOPATHY (HCC): Status: ACTIVE | Noted: 2025-08-21

## 2025-08-21 PROBLEM — R19.5 POSITIVE COLORECTAL CANCER SCREENING USING COLOGUARD TEST: Status: ACTIVE | Noted: 2025-08-21

## 2025-08-21 PROBLEM — H53.031 STRABISMIC AMBLYOPIA OF RIGHT EYE: Status: ACTIVE | Noted: 2025-08-21

## 2025-08-21 PROBLEM — L02.31 CUTANEOUS ABSCESS OF BUTTOCK: Status: ACTIVE | Noted: 2024-10-21

## 2025-08-21 PROBLEM — F10.21 ALCOHOL DEPENDENCE IN REMISSION (HCC): Status: ACTIVE | Noted: 2025-08-21

## 2025-08-21 PROBLEM — M43.15 SPONDYLOLISTHESIS OF THORACOLUMBAR REGION: Status: ACTIVE | Noted: 2025-08-21

## 2025-08-21 PROBLEM — I11.0 HYPERTENSIVE HEART DISEASE WITH HEART FAILURE (HCC): Status: ACTIVE | Noted: 2025-08-21

## 2025-08-21 PROBLEM — G47.9 SLEEP DISTURBANCE: Status: ACTIVE | Noted: 2025-08-21

## 2025-08-21 PROBLEM — E78.5 HYPERLIPIDEMIA: Status: ACTIVE | Noted: 2025-08-21

## 2025-08-21 PROCEDURE — 99284 EMERGENCY DEPT VISIT MOD MDM: CPT

## 2025-08-21 PROCEDURE — 6360000002 HC RX W HCPCS

## 2025-08-21 PROCEDURE — 2500000003 HC RX 250 WO HCPCS

## 2025-08-21 PROCEDURE — 96374 THER/PROPH/DIAG INJ IV PUSH: CPT

## 2025-08-21 PROCEDURE — 96375 TX/PRO/DX INJ NEW DRUG ADDON: CPT

## 2025-08-21 RX ORDER — FAMOTIDINE 20 MG/1
20 TABLET, FILM COATED ORAL 2 TIMES DAILY
Qty: 28 TABLET | Refills: 0 | Status: SHIPPED | OUTPATIENT
Start: 2025-08-21 | End: 2025-09-04

## 2025-08-21 RX ORDER — DEXAMETHASONE SODIUM PHOSPHATE 10 MG/ML
10 INJECTION, SOLUTION INTRA-ARTICULAR; INTRALESIONAL; INTRAMUSCULAR; INTRAVENOUS; SOFT TISSUE ONCE
Status: COMPLETED | OUTPATIENT
Start: 2025-08-21 | End: 2025-08-21

## 2025-08-21 RX ORDER — DIPHENHYDRAMINE HCL 25 MG
25 TABLET ORAL EVERY 6 HOURS
Qty: 20 TABLET | Refills: 0 | Status: SHIPPED | OUTPATIENT
Start: 2025-08-21 | End: 2025-08-26

## 2025-08-21 RX ORDER — DEXAMETHASONE 6 MG/1
6 TABLET ORAL
Qty: 4 TABLET | Refills: 0 | Status: SHIPPED | OUTPATIENT
Start: 2025-08-21 | End: 2025-08-25

## 2025-08-21 RX ORDER — DIPHENHYDRAMINE HYDROCHLORIDE 50 MG/ML
12.5 INJECTION, SOLUTION INTRAMUSCULAR; INTRAVENOUS
Status: COMPLETED | OUTPATIENT
Start: 2025-08-21 | End: 2025-08-21

## 2025-08-21 RX ADMIN — DIPHENHYDRAMINE HYDROCHLORIDE 12.5 MG: 50 INJECTION INTRAMUSCULAR; INTRAVENOUS at 03:59

## 2025-08-21 RX ADMIN — FAMOTIDINE 20 MG: 10 INJECTION, SOLUTION INTRAVENOUS at 03:59

## 2025-08-21 RX ADMIN — DEXAMETHASONE SODIUM PHOSPHATE 10 MG: 10 INJECTION INTRAMUSCULAR; INTRAVENOUS at 04:07

## 2025-08-21 ASSESSMENT — PAIN DESCRIPTION - LOCATION: LOCATION: LEG

## 2025-08-21 ASSESSMENT — LIFESTYLE VARIABLES
HOW MANY STANDARD DRINKS CONTAINING ALCOHOL DO YOU HAVE ON A TYPICAL DAY: PATIENT DOES NOT DRINK
HOW OFTEN DO YOU HAVE A DRINK CONTAINING ALCOHOL: NEVER

## 2025-08-21 ASSESSMENT — PAIN SCALES - GENERAL
PAINLEVEL_OUTOF10: 6
PAINLEVEL_OUTOF10: 0

## 2025-08-21 ASSESSMENT — PAIN DESCRIPTION - DESCRIPTORS: DESCRIPTORS: ACHING

## 2025-08-21 ASSESSMENT — PAIN - FUNCTIONAL ASSESSMENT
PAIN_FUNCTIONAL_ASSESSMENT: 0-10
PAIN_FUNCTIONAL_ASSESSMENT: 0-10

## 2025-09-03 ENCOUNTER — OFFICE VISIT (OUTPATIENT)
Age: 65
End: 2025-09-03
Payer: MEDICARE

## 2025-09-03 VITALS
HEIGHT: 76 IN | WEIGHT: 226 LBS | HEART RATE: 69 BPM | DIASTOLIC BLOOD PRESSURE: 86 MMHG | BODY MASS INDEX: 27.52 KG/M2 | SYSTOLIC BLOOD PRESSURE: 133 MMHG

## 2025-09-03 DIAGNOSIS — N28.89 RIGHT KIDNEY MASS: Primary | ICD-10-CM

## 2025-09-03 DIAGNOSIS — Z98.890 STATUS POST CRYOABLATION: ICD-10-CM

## 2025-09-03 PROCEDURE — G8427 DOCREV CUR MEDS BY ELIG CLIN: HCPCS | Performed by: PHYSICIAN ASSISTANT

## 2025-09-03 PROCEDURE — G8417 CALC BMI ABV UP PARAM F/U: HCPCS | Performed by: PHYSICIAN ASSISTANT

## 2025-09-03 PROCEDURE — 3075F SYST BP GE 130 - 139MM HG: CPT | Performed by: PHYSICIAN ASSISTANT

## 2025-09-03 PROCEDURE — 99214 OFFICE O/P EST MOD 30 MIN: CPT | Performed by: PHYSICIAN ASSISTANT

## 2025-09-03 PROCEDURE — 1036F TOBACCO NON-USER: CPT | Performed by: PHYSICIAN ASSISTANT

## 2025-09-03 PROCEDURE — 3079F DIAST BP 80-89 MM HG: CPT | Performed by: PHYSICIAN ASSISTANT

## 2025-09-03 PROCEDURE — 3017F COLORECTAL CA SCREEN DOC REV: CPT | Performed by: PHYSICIAN ASSISTANT

## (undated) DEVICE — BAG,DRAINAGE,4L,A/R TOWER,LL,SLIDE TAP: Brand: MEDLINE

## (undated) DEVICE — KENDALL SCD EXPRESS SLEEVES, KNEE LENGTH, MEDIUM: Brand: KENDALL SCD

## (undated) DEVICE — NITINOL WIRE WITH HYDROPHILIC TIP: Brand: SENSOR

## (undated) DEVICE — INTENDED FOR TISSUE SEPARATION, AND OTHER PROCEDURES THAT REQUIRE A SHARP SURGICAL BLADE TO PUNCTURE OR CUT.: Brand: BARD-PARKER SAFETY BLADES SIZE 10, STERILE

## (undated) DEVICE — DEVICE STBL AD TRICOT ANCHR PD FOR 3 W F CATH STATLOK

## (undated) DEVICE — (D)PREP SKN CHLRAPRP APPL 26ML -- CONVERT TO ITEM 371833

## (undated) DEVICE — KIT EVAC 400CC DIA3/16IN H PAT 12.5IN 3 SPR RND SHP PVC DRN

## (undated) DEVICE — MEDI-VAC NON-CONDUCTIVE SUCTION TUBING: Brand: CARDINAL HEALTH

## (undated) DEVICE — REM POLYHESIVE ADULT PATIENT RETURN ELECTRODE: Brand: VALLEYLAB

## (undated) DEVICE — 3M™ STERI-STRIP™ REINFORCED ADHESIVE SKIN CLOSURES, R1548, 1 IN X 5 IN (25 MM X 125 MM), 4 STRIPS/ENVELOPE: Brand: 3M™ STERI-STRIP™

## (undated) DEVICE — 5.0MM PRECISION ROUND

## (undated) DEVICE — THE MILL DISPOSABLE - FINE

## (undated) DEVICE — SOLUTION IRRIG 1000ML H2O PIC PLAS SHATTERPROOF CONTAINER

## (undated) DEVICE — Device

## (undated) DEVICE — SYRINGE,TOOMEY,IRRIGATION,70CC,STERILE: Brand: MEDLINE

## (undated) DEVICE — Y-TYPE TUR/BLADDER IRRIGATION SET, REGULATING CLAMP

## (undated) DEVICE — BAG DRAIN UROLOGY GENESIS NS

## (undated) DEVICE — CUTTING LOOP, BIPOLAR, 24/26 FR.: Brand: N.A.

## (undated) DEVICE — SOLUTION IV 1000ML 0.9% SOD CHL

## (undated) DEVICE — PACK PROCEDURE SURG POST LAMINECTOMY CDS

## (undated) DEVICE — CATHETER URETH 24FR BLLN 30CC STD LTX 3 W TWO OPP DRNGE EYE

## (undated) DEVICE — X-RAY SPONGES,12 PLY: Brand: DERMACEA

## (undated) DEVICE — SUTURE VCRL + 3-0 L27IN ABSRB UD PS-2 L19MM 3/8 CIR PRIM VCP427H

## (undated) DEVICE — SUTURE VCRL SZ 1 L27IN ABSRB UD L36MM CP-1 1/2 CIR REV CUT J268H

## (undated) DEVICE — X-LARGE COTTON GLOVE: Brand: DEROYAL

## (undated) DEVICE — KIT POS W/ FOAM ARM CRADL SHEARGUARD CHST PD CVR FOR SPNL

## (undated) DEVICE — WAX SURG 2.5GM HEMSTAT BNE BEESWAX PARAFFIN ISO PALMITATE

## (undated) DEVICE — DRILL BIT: Brand: XIA 4.5 SYSTEM -  XIA CT

## (undated) DEVICE — DRAPE XR C ARM 41X74IN LF --

## (undated) DEVICE — INTENDED FOR TISSUE SEPARATION, AND OTHER PROCEDURES THAT REQUIRE A SHARP SURGICAL BLADE TO PUNCTURE OR CUT.: Brand: BARD-PARKER SAFETY BLADES SIZE 15, STERILE

## (undated) DEVICE — SYRINGE MED 30ML STD CLR PLAS LUERLOCK TIP N CTRL DISP

## (undated) DEVICE — CONTAINER,SPECIMEN,O.R.STRL,4.5OZ: Brand: MEDLINE

## (undated) DEVICE — 20 ML SYRINGE LUER-LOCK TIP: Brand: MONOJECT

## (undated) DEVICE — TURP TURB: Brand: MEDLINE INDUSTRIES, INC.

## (undated) DEVICE — (D)SYR 10ML 1/5ML GRAD NSAF -- PKGING CHANGE USE ITEM 338027

## (undated) DEVICE — FIBER LASER 550UM 6J 80HZ 120W D F L FOR LITHO MOSES

## (undated) DEVICE — AMD ANTIMICROBIAL GAUZE SPONGES,12 PLY USP TYPE VII, 0.2% POLYHEXAMETHYLENE BIGUANIDE HCI (PHMB): Brand: CURITY

## (undated) DEVICE — SOLUTION IRRIG 1000ML 0.9% SOD CHL USP POUR PLAS BTL

## (undated) DEVICE — TRAY CATH 16F DRN BG LTX -- CONVERT TO ITEM 363158

## (undated) DEVICE — STERILE PACKAGE WITH CANNULA: Brand: LITE BIO DELIVERY SYSTEM

## (undated) DEVICE — SOLUTION IV STRL H2O 500 ML AQUALITE POUR BTL

## (undated) DEVICE — SOLUTION IRRIG 1000ML 09% SOD CHL USP PIC PLAS CONTAINER

## (undated) DEVICE — DRAPE SHT 3 QTR PROXIMA 53X77 --

## (undated) DEVICE — SOL IRR SOD CHL 0.9% TITAN XL CNTNR 3000ML

## (undated) DEVICE — MASTISOL ADHESIVE LIQ 2/3ML

## (undated) DEVICE — SUTURE VCRL SZ 2-0 L27IN ABSRB UD L36MM CP-1 1/2 CIR REV J266H

## (undated) DEVICE — BLADE ASSEMB CLP HAIR FINE --

## (undated) DEVICE — SOLUTION IRRIG 3000ML H2O STRL BAG

## (undated) DEVICE — FLOSEAL HEMOSTATIC MATRIX, 10 ML: Brand: FLOSEAL

## (undated) DEVICE — GARMENT,MEDLINE,DVT,INT,CALF,MED, GEN2: Brand: MEDLINE

## (undated) DEVICE — STERILE POLYISOPRENE POWDER-FREE SURGICAL GLOVES: Brand: PROTEXIS

## (undated) DEVICE — JAM SHIDI: Brand: XIA PRECISION SYSTEM

## (undated) DEVICE — SOLUTION IRRIG 3000ML 0.9% SOD CHL USP UROMATIC PLAS CONT

## (undated) DEVICE — 1010 S-DRAPE TOWEL DRAPE 10/BX: Brand: STERI-DRAPE™

## (undated) DEVICE — 2000CC GUARDIAN II: Brand: GUARDIAN